# Patient Record
Sex: MALE | Race: WHITE | NOT HISPANIC OR LATINO | Employment: OTHER | URBAN - METROPOLITAN AREA
[De-identification: names, ages, dates, MRNs, and addresses within clinical notes are randomized per-mention and may not be internally consistent; named-entity substitution may affect disease eponyms.]

---

## 2023-02-07 ENCOUNTER — APPOINTMENT (OUTPATIENT)
Dept: GENERAL RADIOLOGY | Facility: HOSPITAL | Age: 88
DRG: 522 | End: 2023-02-07
Payer: MEDICARE

## 2023-02-07 ENCOUNTER — HOSPITAL ENCOUNTER (INPATIENT)
Facility: HOSPITAL | Age: 88
LOS: 7 days | Discharge: NURSING, REHAB OR LONG-TERM CARE | DRG: 522 | End: 2023-02-16
Attending: EMERGENCY MEDICINE | Admitting: HOSPITALIST
Payer: MEDICARE

## 2023-02-07 ENCOUNTER — APPOINTMENT (OUTPATIENT)
Dept: CT IMAGING | Facility: HOSPITAL | Age: 88
DRG: 522 | End: 2023-02-07
Payer: MEDICARE

## 2023-02-07 DIAGNOSIS — S42.464A CLOSED NONDISPLACED FRACTURE OF MEDIAL CONDYLE OF RIGHT HUMERUS, INITIAL ENCOUNTER: ICD-10-CM

## 2023-02-07 DIAGNOSIS — Z86.59 HISTORY OF DEMENTIA: ICD-10-CM

## 2023-02-07 DIAGNOSIS — S72.001A CLOSED FRACTURE OF NECK OF RIGHT FEMUR, INITIAL ENCOUNTER: ICD-10-CM

## 2023-02-07 DIAGNOSIS — S42.201A CLOSED FRACTURE OF PROXIMAL END OF RIGHT HUMERUS, UNSPECIFIED FRACTURE MORPHOLOGY, INITIAL ENCOUNTER: Primary | ICD-10-CM

## 2023-02-07 DIAGNOSIS — S22.000A COMPRESSION FRACTURE OF THORACIC VERTEBRA, UNSPECIFIED THORACIC VERTEBRAL LEVEL, INITIAL ENCOUNTER: ICD-10-CM

## 2023-02-07 PROBLEM — E78.5 HLD (HYPERLIPIDEMIA): Status: ACTIVE | Noted: 2023-02-07

## 2023-02-07 PROBLEM — I10 HTN (HYPERTENSION): Status: ACTIVE | Noted: 2023-02-07

## 2023-02-07 LAB
ANION GAP SERPL CALCULATED.3IONS-SCNC: 6 MMOL/L (ref 5–15)
BASOPHILS # BLD AUTO: 0.03 10*3/MM3 (ref 0–0.2)
BASOPHILS NFR BLD AUTO: 0.3 % (ref 0–1.5)
BUN SERPL-MCNC: 20 MG/DL (ref 8–23)
BUN/CREAT SERPL: 19.6 (ref 7–25)
CALCIUM SPEC-SCNC: 8.5 MG/DL (ref 8.6–10.5)
CHLORIDE SERPL-SCNC: 104 MMOL/L (ref 98–107)
CO2 SERPL-SCNC: 27 MMOL/L (ref 22–29)
CREAT SERPL-MCNC: 1.02 MG/DL (ref 0.76–1.27)
DEPRECATED RDW RBC AUTO: 41.9 FL (ref 37–54)
EGFRCR SERPLBLD CKD-EPI 2021: 70.7 ML/MIN/1.73
EOSINOPHIL # BLD AUTO: 0.08 10*3/MM3 (ref 0–0.4)
EOSINOPHIL NFR BLD AUTO: 0.8 % (ref 0.3–6.2)
ERYTHROCYTE [DISTWIDTH] IN BLOOD BY AUTOMATED COUNT: 12.4 % (ref 12.3–15.4)
GLUCOSE SERPL-MCNC: 137 MG/DL (ref 65–99)
HCT VFR BLD AUTO: 34.6 % (ref 37.5–51)
HGB BLD-MCNC: 11.5 G/DL (ref 13–17.7)
HOLD SPECIMEN: NORMAL
HOLD SPECIMEN: NORMAL
IMM GRANULOCYTES # BLD AUTO: 0.05 10*3/MM3 (ref 0–0.05)
IMM GRANULOCYTES NFR BLD AUTO: 0.5 % (ref 0–0.5)
LYMPHOCYTES # BLD AUTO: 0.81 10*3/MM3 (ref 0.7–3.1)
LYMPHOCYTES NFR BLD AUTO: 7.7 % (ref 19.6–45.3)
MCH RBC QN AUTO: 30.9 PG (ref 26.6–33)
MCHC RBC AUTO-ENTMCNC: 33.2 G/DL (ref 31.5–35.7)
MCV RBC AUTO: 93 FL (ref 79–97)
MONOCYTES # BLD AUTO: 0.58 10*3/MM3 (ref 0.1–0.9)
MONOCYTES NFR BLD AUTO: 5.5 % (ref 5–12)
NEUTROPHILS NFR BLD AUTO: 85.2 % (ref 42.7–76)
NEUTROPHILS NFR BLD AUTO: 9 10*3/MM3 (ref 1.7–7)
NRBC BLD AUTO-RTO: 0 /100 WBC (ref 0–0.2)
PLATELET # BLD AUTO: 179 10*3/MM3 (ref 140–450)
PMV BLD AUTO: 10.1 FL (ref 6–12)
POTASSIUM SERPL-SCNC: 3.8 MMOL/L (ref 3.5–5.2)
RBC # BLD AUTO: 3.72 10*6/MM3 (ref 4.14–5.8)
SARS-COV-2 RNA RESP QL NAA+PROBE: NOT DETECTED
SODIUM SERPL-SCNC: 137 MMOL/L (ref 136–145)
WBC NRBC COR # BLD: 10.55 10*3/MM3 (ref 3.4–10.8)
WHOLE BLOOD HOLD COAG: NORMAL
WHOLE BLOOD HOLD SPECIMEN: NORMAL

## 2023-02-07 PROCEDURE — 73030 X-RAY EXAM OF SHOULDER: CPT

## 2023-02-07 PROCEDURE — G0378 HOSPITAL OBSERVATION PER HR: HCPCS

## 2023-02-07 PROCEDURE — 72125 CT NECK SPINE W/O DYE: CPT

## 2023-02-07 PROCEDURE — 80048 BASIC METABOLIC PNL TOTAL CA: CPT | Performed by: EMERGENCY MEDICINE

## 2023-02-07 PROCEDURE — 70450 CT HEAD/BRAIN W/O DYE: CPT

## 2023-02-07 PROCEDURE — U0005 INFEC AGEN DETEC AMPLI PROBE: HCPCS | Performed by: EMERGENCY MEDICINE

## 2023-02-07 PROCEDURE — 99285 EMERGENCY DEPT VISIT HI MDM: CPT

## 2023-02-07 PROCEDURE — 85025 COMPLETE CBC W/AUTO DIFF WBC: CPT | Performed by: EMERGENCY MEDICINE

## 2023-02-07 PROCEDURE — U0003 INFECTIOUS AGENT DETECTION BY NUCLEIC ACID (DNA OR RNA); SEVERE ACUTE RESPIRATORY SYNDROME CORONAVIRUS 2 (SARS-COV-2) (CORONAVIRUS DISEASE [COVID-19]), AMPLIFIED PROBE TECHNIQUE, MAKING USE OF HIGH THROUGHPUT TECHNOLOGIES AS DESCRIBED BY CMS-2020-01-R: HCPCS | Performed by: EMERGENCY MEDICINE

## 2023-02-07 RX ORDER — FOLIC ACID 1 MG/1
1 TABLET ORAL DAILY
COMMUNITY

## 2023-02-07 RX ORDER — TRAMADOL HYDROCHLORIDE 50 MG/1
50 TABLET ORAL ONCE
Status: COMPLETED | OUTPATIENT
Start: 2023-02-07 | End: 2023-02-07

## 2023-02-07 RX ORDER — OMEPRAZOLE 20 MG/1
20 CAPSULE, DELAYED RELEASE ORAL DAILY
COMMUNITY

## 2023-02-07 RX ORDER — BISACODYL 10 MG
10 SUPPOSITORY, RECTAL RECTAL DAILY PRN
Status: DISCONTINUED | OUTPATIENT
Start: 2023-02-07 | End: 2023-02-16 | Stop reason: HOSPADM

## 2023-02-07 RX ORDER — LANOLIN ALCOHOL/MO/W.PET/CERES
6 CREAM (GRAM) TOPICAL NIGHTLY
COMMUNITY

## 2023-02-07 RX ORDER — LOSARTAN POTASSIUM 25 MG/1
25 TABLET ORAL DAILY
COMMUNITY

## 2023-02-07 RX ORDER — ACETAMINOPHEN 325 MG/1
650 TABLET ORAL EVERY 4 HOURS PRN
Status: DISCONTINUED | OUTPATIENT
Start: 2023-02-07 | End: 2023-02-16 | Stop reason: HOSPADM

## 2023-02-07 RX ORDER — POLYETHYLENE GLYCOL 3350 17 G/17G
17 POWDER, FOR SOLUTION ORAL DAILY PRN
Status: DISCONTINUED | OUTPATIENT
Start: 2023-02-07 | End: 2023-02-16 | Stop reason: HOSPADM

## 2023-02-07 RX ORDER — LEVOTHYROXINE SODIUM 0.03 MG/1
25 TABLET ORAL
COMMUNITY

## 2023-02-07 RX ORDER — ONDANSETRON 2 MG/ML
4 INJECTION INTRAMUSCULAR; INTRAVENOUS EVERY 6 HOURS PRN
Status: DISCONTINUED | OUTPATIENT
Start: 2023-02-07 | End: 2023-02-10 | Stop reason: SDUPTHER

## 2023-02-07 RX ORDER — MELATONIN
1000 DAILY
COMMUNITY
End: 2023-02-16 | Stop reason: HOSPADM

## 2023-02-07 RX ORDER — FINASTERIDE 5 MG/1
5 TABLET, FILM COATED ORAL DAILY
COMMUNITY

## 2023-02-07 RX ORDER — ALFUZOSIN HYDROCHLORIDE 10 MG/1
10 TABLET, EXTENDED RELEASE ORAL DAILY
COMMUNITY

## 2023-02-07 RX ORDER — ACETAMINOPHEN 500 MG
1000 TABLET ORAL ONCE
Status: COMPLETED | OUTPATIENT
Start: 2023-02-07 | End: 2023-02-07

## 2023-02-07 RX ORDER — BISACODYL 5 MG/1
5 TABLET, DELAYED RELEASE ORAL DAILY PRN
Status: DISCONTINUED | OUTPATIENT
Start: 2023-02-07 | End: 2023-02-16 | Stop reason: HOSPADM

## 2023-02-07 RX ORDER — ONDANSETRON 4 MG/1
4 TABLET, FILM COATED ORAL EVERY 6 HOURS PRN
Status: DISCONTINUED | OUTPATIENT
Start: 2023-02-07 | End: 2023-02-10 | Stop reason: SDUPTHER

## 2023-02-07 RX ORDER — GUAIFENESIN AND DEXTROMETHORPHAN HYDROBROMIDE 100; 10 MG/5ML; MG/5ML
5 SOLUTION ORAL EVERY 4 HOURS PRN
COMMUNITY
End: 2023-02-16 | Stop reason: HOSPADM

## 2023-02-07 RX ORDER — SODIUM CHLORIDE 0.9 % (FLUSH) 0.9 %
10 SYRINGE (ML) INJECTION AS NEEDED
Status: DISCONTINUED | OUTPATIENT
Start: 2023-02-07 | End: 2023-02-10

## 2023-02-07 RX ORDER — HYDROCODONE BITARTRATE AND ACETAMINOPHEN 5; 325 MG/1; MG/1
1 TABLET ORAL EVERY 4 HOURS PRN
Status: DISCONTINUED | OUTPATIENT
Start: 2023-02-08 | End: 2023-02-09

## 2023-02-07 RX ORDER — ALUMINA, MAGNESIA, AND SIMETHICONE 2400; 2400; 240 MG/30ML; MG/30ML; MG/30ML
15 SUSPENSION ORAL EVERY 6 HOURS PRN
Status: DISCONTINUED | OUTPATIENT
Start: 2023-02-07 | End: 2023-02-16 | Stop reason: HOSPADM

## 2023-02-07 RX ORDER — MEMANTINE HYDROCHLORIDE 10 MG/1
10 TABLET ORAL 2 TIMES DAILY
COMMUNITY

## 2023-02-07 RX ORDER — SIMVASTATIN 40 MG
40 TABLET ORAL DAILY
COMMUNITY

## 2023-02-07 RX ORDER — ACETAMINOPHEN 325 MG/1
650 TABLET ORAL EVERY 8 HOURS PRN
COMMUNITY
End: 2023-02-16 | Stop reason: HOSPADM

## 2023-02-07 RX ORDER — ALBUTEROL SULFATE 90 UG/1
2 AEROSOL, METERED RESPIRATORY (INHALATION) EVERY 6 HOURS PRN
COMMUNITY

## 2023-02-07 RX ORDER — UREA 10 %
3 LOTION (ML) TOPICAL NIGHTLY PRN
Status: DISCONTINUED | OUTPATIENT
Start: 2023-02-07 | End: 2023-02-10

## 2023-02-07 RX ORDER — HYDROCODONE BITARTRATE AND ACETAMINOPHEN 5; 325 MG/1; MG/1
1 TABLET ORAL EVERY 6 HOURS PRN
Status: DISCONTINUED | OUTPATIENT
Start: 2023-02-07 | End: 2023-02-07

## 2023-02-07 RX ORDER — CARVEDILOL 12.5 MG/1
12.5 TABLET ORAL 2 TIMES DAILY WITH MEALS
COMMUNITY

## 2023-02-07 RX ORDER — AMOXICILLIN 250 MG
2 CAPSULE ORAL 2 TIMES DAILY
Status: DISCONTINUED | OUTPATIENT
Start: 2023-02-07 | End: 2023-02-16 | Stop reason: HOSPADM

## 2023-02-07 RX ADMIN — HYDROCODONE BITARTRATE AND ACETAMINOPHEN 1 TABLET: 5; 325 TABLET ORAL at 21:06

## 2023-02-07 RX ADMIN — ACETAMINOPHEN 1000 MG: 500 TABLET, FILM COATED ORAL at 18:08

## 2023-02-07 RX ADMIN — TRAMADOL HYDROCHLORIDE 50 MG: 50 TABLET, COATED ORAL at 16:29

## 2023-02-07 NOTE — DISCHARGE PLACEMENT REQUEST
"Juancarlos Woo (88 y.o. Male)     Date of Birth   1934    Social Security Number       Address   [address] [place] [address]    Home Phone   [phone]    MRN   [id number]       Mandaen   [employer]    Marital Status   Single                            Admission Date   2/7/23    Admission Type   Emergency    Admitting Provider       Attending Provider   Juanpablo Blanchard MD    Department, Room/Bed   [facility] Emergency Department, 38/38       Discharge Date       Discharge Disposition       Discharge Destination                               Attending Provider: Juanpablo Blanchard MD    Allergies: Lisinopril    Isolation: None   Infection: None   Code Status: Not on file    Ht: 182.9 cm (72\")   Wt: 63.5 kg (140 lb)    Admission Cmt: None   Principal Problem: None                Active Insurance as of 2/7/2023     Primary Coverage     Payor Plan Insurance Group Employer/Plan Group    MEDICARE MEDICARE A & B      Payor Plan Address Payor Plan Phone Number Payor Plan Fax Number Effective Dates    [address] [phone]  10/1/1999 - None Entered    [facility] [id number]       Subscriber Name Subscriber Birth Date Member ID       JUANCARLOS WOO 1934 [id number]           Secondary Coverage     Payor Plan Insurance Group Employer/Plan Group    [facility] [place]RATION [place] DEPT [id number]     Payor Plan Address Payor Plan Phone Number Payor Plan Fax Number Effective Dates    [facility] OFFICE OF COMMUNITY CARE [phone]  2/2/2023 - None Entered    [address] [address]       [facility] [address]       Subscriber Name Subscriber Birth Date Member ID       JUANCARLOS WOO 1934 [id number]           Tertiary Coverage     Payor Plan Insurance Group Employer/Plan Group    [employer] SUP AARP HEALTH CARE OPTIONS      Payor Plan Address Payor Plan Phone Number Payor Plan Fax Number Effective Dates    [facility] [phone]  1/1/2023 - None Entered    [address]       [facility] [address]   "     Subscriber Name Subscriber Birth Date Member ID       JUANCARLOS WOO 1934 [id number]                 Emergency Contacts      (Rel.) Home Phone Work Phone Mobile Phone    ROSAURA,JJ (Daughter) -- -- [phone]

## 2023-02-07 NOTE — ED PROVIDER NOTES
EMERGENCY DEPARTMENT ENCOUNTER    Room Number:  P383/1  Date of encounter:  2/8/2023  PCP: Princess Yi MD  Historian: Patient, daughter      HPI:  Chief Complaint: Fall, right shoulder pain  A complete HPI/ROS/PMH/PSH/SH/FH are unobtainable due to: Dementia    Context: Cara Garvey is a 88 y.o. male who presents to the ED via [facility] for an unwitnessed fall today in the bathroom.  Patient initially denied any pain other than right shoulder and has since started complaining of head and neck pain.  Patient had a C2 fracture in July and has been in a soft collar.  Has not had any significant pain recently.  Seems to be at baseline per family.  Not in any anticoagulation. Most severe pain in the right shoulder.  Patient lives in a memory care unit.      MEDICAL RECORD REVIEW    External (non-ED) record review: Paperwork accompanying the patient from facility does not show any anticoagulants, Tylenol is only listed pain medication    PAST MEDICAL HISTORY  Active Ambulatory Problems     Diagnosis Date Noted   • No Active Ambulatory Problems     Resolved Ambulatory Problems     Diagnosis Date Noted   • No Resolved Ambulatory Problems     No Additional Past Medical History         PAST SURGICAL HISTORY  No past surgical history on file.      FAMILY HISTORY  No family history on file.      SOCIAL HISTORY  Social History     Socioeconomic History   • Marital status: Single   Substance and Sexual Activity   • Alcohol use: Not Currently     Comment: Has been in a nursing home for over 3 months   • Drug use: Never   • Sexual activity: Not Currently     Partners: Female         ALLERGIES  Lisinopril        REVIEW OF SYSTEMS  Review of Systems     All systems reviewed and negative except for those discussed in HPI.       PHYSICAL EXAM    I have reviewed the triage vital signs and nursing notes.    ED Triage Vitals   Temp Heart Rate Resp BP SpO2   02/07/23 1411 02/07/23 1411 02/07/23 1411  02/07/23 1411 02/07/23 1411   96.5 °F (35.8 °C) 51 18 127/53 96 %      Temp src Heart Rate Source Patient Position BP Location FiO2 (%)   02/07/23 1411 02/07/23 1411 02/07/23 1531 02/07/23 1531 --   Tympanic Monitor Lying Right arm        Physical Exam  General: No acute distress, nontoxic  HEENT: Mucous membranes moist, atraumatic without palpable scalp hematoma or laceration, EOMI  Neck: Soft collar in place, trachea midline  Pulm: Symmetric chest rise, nonlabored, lungs CTAB  Cardiovascular: Regular rate and rhythm, intact distal pulses  GI: Soft, nontender, nondistended, no rebound, no guarding, bowel sounds present  MSK: Right shoulder tenderness to palpation worse in the proximal humerus region, elbow is nontender to palpation without deformity, no clavicular step-off  Skin: Warm, dry  Neuro: Awake, alert, GCS 15, moving all extremities, no focal deficits  Psych: Calm, cooperative      N95, protective eye goggles, and gloves used during this encounter. Patient in surgical mask.      LAB RESULTS  Recent Results (from the past 24 hour(s))   Green Top (Gel)    Collection Time: 02/07/23  3:16 PM   Result Value Ref Range    Extra Tube Hold for add-ons.    Lavender Top    Collection Time: 02/07/23  3:16 PM   Result Value Ref Range    Extra Tube hold for add-on    Gold Top - SST    Collection Time: 02/07/23  3:16 PM   Result Value Ref Range    Extra Tube Hold for add-ons.    Light Blue Top    Collection Time: 02/07/23  3:16 PM   Result Value Ref Range    Extra Tube Hold for add-ons.    Basic Metabolic Panel    Collection Time: 02/07/23  3:16 PM    Specimen: Blood   Result Value Ref Range    Glucose 137 (H) 65 - 99 mg/dL    BUN 20 8 - 23 mg/dL    Creatinine 1.02 0.76 - 1.27 mg/dL    Sodium 137 136 - 145 mmol/L    Potassium 3.8 3.5 - 5.2 mmol/L    Chloride 104 98 - 107 mmol/L    CO2 27.0 22.0 - 29.0 mmol/L    Calcium 8.5 (L) 8.6 - 10.5 mg/dL    BUN/Creatinine Ratio 19.6 7.0 - 25.0    Anion Gap 6.0 5.0 - 15.0 mmol/L     eGFR 70.7 >60.0 mL/min/1.73   CBC Auto Differential    Collection Time: 02/07/23  3:16 PM    Specimen: Blood   Result Value Ref Range    WBC 10.55 3.40 - 10.80 10*3/mm3    RBC 3.72 (L) 4.14 - 5.80 10*6/mm3    Hemoglobin 11.5 (L) 13.0 - 17.7 g/dL    Hematocrit 34.6 (L) 37.5 - 51.0 %    MCV 93.0 79.0 - 97.0 fL    MCH 30.9 26.6 - 33.0 pg    MCHC 33.2 31.5 - 35.7 g/dL    RDW 12.4 12.3 - 15.4 %    RDW-SD 41.9 37.0 - 54.0 fl    MPV 10.1 6.0 - 12.0 fL    Platelets 179 140 - 450 10*3/mm3    Neutrophil % 85.2 (H) 42.7 - 76.0 %    Lymphocyte % 7.7 (L) 19.6 - 45.3 %    Monocyte % 5.5 5.0 - 12.0 %    Eosinophil % 0.8 0.3 - 6.2 %    Basophil % 0.3 0.0 - 1.5 %    Immature Grans % 0.5 0.0 - 0.5 %    Neutrophils, Absolute 9.00 (H) 1.70 - 7.00 10*3/mm3    Lymphocytes, Absolute 0.81 0.70 - 3.10 10*3/mm3    Monocytes, Absolute 0.58 0.10 - 0.90 10*3/mm3    Eosinophils, Absolute 0.08 0.00 - 0.40 10*3/mm3    Basophils, Absolute 0.03 0.00 - 0.20 10*3/mm3    Immature Grans, Absolute 0.05 0.00 - 0.05 10*3/mm3    nRBC 0.0 0.0 - 0.2 /100 WBC   COVID-19,[facility] CARRI IN-HOUSE CEPHEID/PRINCE NP SWAB IN TRANSPORT MEDIA 8-12 HR TAT - Swab, Nasopharynx    Collection Time: 02/07/23  6:08 PM    Specimen: Nasopharynx; Swab   Result Value Ref Range    COVID19 Not Detected Not Detected - Ref. Range       Ordered the above labs and independently interpreted results. My findings will be discussed in the medical decision making section below        RADIOLOGY  XR Shoulder 2+ View Right    Result Date: 2/7/2023  XR SHOULDER 2+ VW RIGHT-  INDICATIONS: Trauma  TECHNIQUE: 2 views of the right shoulder  COMPARISON: None available  FINDINGS:  Comminuted fracture of the proximal right humerus is noted, with as much as about 3 mm cortical step-off. No other fractures are identified. Moderate hypertrophic degenerative change is apparent at the acromioclavicular joint. No dislocation.       Proximal right humerus fracture.  This report was finalized on 2/7/2023 2:55 PM by  Dr. Eddie Jones M.D.      CT Head Without Contrast, CT Cervical Spine Without Contrast    Result Date: 2/7/2023  CT HEAD AND CERVICAL SPINE WITHOUT CONTRAST  CLINICAL HISTORY: Unwitnessed fall. Neck pain. History of C2 fracture in July 2022.  TECHNIQUE: CT scan of the head was obtained with 3 mm axial soft tissue and 2 mm bone algorithm images. No intravenous contrast was administered. Sagittal and coronal reconstructions were obtained.  COMPARISON: No previous similar studies are currently available for comparison.  FINDINGS:   There is no evidence for a calvarial fracture and there is no evidence for an acute extra-axial hemorrhage. The ventricles, sulci, and cisterns are age-appropriate. The gray-white matter differentiation is within normal limits. The basal ganglia and thalami are unremarkable in appearance. The posterior fossa structures are within normal limits. Atherosclerotic calcifications are incidentally appreciated within the intracranial vasculature. Mild changes of chronic small vessel ischemic phenomena are incidentally noted.  There is mild mucosal thickening noted within the left aspect of the frontal sinus and this extends into the adjacent frontal ethmoidal recess.       No evidence for acute traumatic intracranial pathology.    TECHNIQUE: CT scan of the cervical spine was obtained with 1 mm axial bone algorithm and 2 mm axial soft tissue algorithm images. Sagittal and coronal reconstructed images were obtained.  FINDINGS:  The patient apparently has a history of a C2 fracture. There are no prior cervical spine imaging studies currently available for comparison. There is an age-indeterminate deformity within the base of the odontoid process. Unfortunately, as there are no previous studies available for comparison, any change from the patient's baseline cannot be assessed on the basis of this study. If older studies are made available for comparison, an addendum will be gladly generated.   There are mild age-indeterminate compression deformity seen at T2 and T3.  There is anterior spondylolisthesis of C4 on C5 by approximately 3 mm. Advanced degenerative disc changes are seen at the C5-6 level. A disc osteophyte complex at C5-C6 results in a mild degree of canal stenosis. Bulging disc material at C3-4 and C4-5 results in up to a moderate-to-severe degree of canal stenosis at the C4-5 level. There is probable cord compression at this level. Foraminal stenotic changes are most prominently seen at the C3-4, C4-5, and C5-6 levels. There is osseous fusion noted across the C6-7 disc space presumably representing a prior anterior cervical discectomy and fusion procedure.  There is an indeterminate hypodense nodule within the right lobe of the thyroid measuring up to 2.4 cm in diameter. This could be further evaluated with sonography, only if clinically indicated.  IMPRESSION:  There is a deformity at the level of the base of the odontoid process. The patient reportedly has a history of an odontoid fracture. However, the appearance of the odontoid with respect to its baseline appearance is not assessed as there are no previous cervical spine imaging studies currently available for comparison. If these images are submitted for comparison, an addendum would be gladly generated. Unfortunately, on the basis of these images, an acute injury to the odontoid could not be excluded.  There is mild vertebral body height loss at the T2 and T3 levels. These compression deformities are age indeterminate. Further temporal characterization could be made with MR imaging, as clinically indicated.  There is degenerative phenomena within the cervical spine that has been discussed in detail above. There is up to a moderate-to-severe degree of central canal stenosis at the C4-5 level secondary to a disc bulge with probable cord compression.  There is osseous fusion seen across the C6-7 disc space that is presumably from a prior  anterior cervical discectomy and fusion procedure.  There is an indeterminate 2.4 cm hypodense nodule within the right lobe of the thyroid which could be further evaluated with thyroid sonography, only if clinically indicated.  These findings and recommendations were discussed with Dr. Lee Blanchard on 02/07/2023 at approximately 6:12 PM.   Radiation dose reduction techniques were utilized, including automated exposure control and exposure modulation based on body size.  This report was finalized on 2/7/2023 6:29 PM by Dr. Roby Mendez M.D.        Ordered the above noted radiological studies.  Independently interpreted by me and my independent review of findings can be found in the ED Course.  See dictation for official radiology interpretation.      PROCEDURES    Procedures      MEDICATIONS GIVEN IN ER    Medications   sodium chloride 0.9 % flush 10 mL (has no administration in time range)   acetaminophen (TYLENOL) tablet 650 mg (has no administration in time range)   melatonin tablet 3 mg (has no administration in time range)   sennosides-docusate (PERICOLACE) 8.6-50 MG per tablet 2 tablet (has no administration in time range)     And   polyethylene glycol (MIRALAX) packet 17 g (has no administration in time range)     And   bisacodyl (DULCOLAX) EC tablet 5 mg (has no administration in time range)     And   bisacodyl (DULCOLAX) suppository 10 mg (has no administration in time range)   ondansetron (ZOFRAN) tablet 4 mg (has no administration in time range)     Or   ondansetron (ZOFRAN) injection 4 mg (has no administration in time range)   aluminum-magnesium hydroxide-simethicone (MAALOX MAX) 400-400-40 MG/5ML suspension 15 mL (has no administration in time range)   HYDROcodone-acetaminophen (NORCO) 5-325 MG per tablet 1 tablet (has no administration in time range)   traMADol (ULTRAM) tablet 50 mg (50 mg Oral Given 2/7/23 1629)   acetaminophen (TYLENOL) tablet 1,000 mg (1,000 mg Oral Given 2/7/23 1808)         PROGRESS,  DATA ANALYSIS, CONSULTS, AND MEDICAL DECISION MAKING    Please note that this section constitutes my independent interpretation of clinical data including lab results, radiology, EKG's.  This constitutes my independent professional opinion regarding differential diagnosis and management of this patient.  It may include any factors such as history from outside sources, review of external records, social determinants of health, management of medications, response to those treatments, and discussions with other providers.    Differential Diagnosis and Plan: Initial concern for shoulder dislocation, humeral fracture, clavicular fracture, aggravation of underlying cervical fracture, intracranial hemorrhage, among others.  Plan for CT head, CT C-spine, right shoulder x-ray, and reevaluation with results.    Additional sources:  - Discussed/ obtained information from independent historians:   Daughter at bedside providing additional history that the patient cannot due to dementia     - Chronic or social conditions impacting care: History of dementia living at memory care unit     - Shared decision making:  Patient and family fully updated on and in agreement with the course and plan moving forward    ED Course as of 02/08/23 0006   e Feb 07, 2023   1630 XR Shoulder 2+ View Right  My independent interpretation of the right shoulder x-ray shows a proximal right humerus fracture with no obvious clavicular fracture or pneumothorax in the visible lung field [DC]   1840 WBC: 10.55 [DC]   1840 Hemoglobin(!): 11.5 [DC]   1851 CT Head Without Contrast  Independently reviewed by me, no evidence of intracranial hemorrhage [DC]   1851 CT Head Without Contrast  I did discuss images with the radiologist Dr. Mendez, no evidence of any acute emergent intracranial abnormality [DC]   1852 CT Cervical Spine Without Contrast  Discussed with Dr. Mendez, radiologist, patient has evidence of the C2 fracture though unclear if any difference from  prior as no imaging to compare to.  Does note T2 and T3 compression fractures which are age-indeterminate [DC]   1906 COVID19: Not Detected [DC]   1946 Glucose(!): 137 [DC]   1946 BUN: 20 [DC]   1946 Creatinine: 1.02 [DC]   1946 Sodium: 137 [DC]   1947 Proximal humerus fracture as noted, age-indeterminate thoracic compression fractures as noted, C2 fracture is noted.  At this point facility that he currently lives at will not be able to care for him in his current state, will have to hospitalize with plans for short-term rehab placement.  Family has been fully updated and in agreement with the course and plan moving forward. [DC]   2054 Discussed with LEWIS Daniels, [facility], discussed patient's clinical course and findings today, treatment modalities, and need for hospitalization [DC]      ED Course User Index  [DC] Juanpablo Blanchard MD       Orders Placed During This Visit:  Orders Placed This Encounter   Procedures   • [facility] 02.  Shoulder Immobilizer/Sling   • COVID PRE-OP / PRE-PROCEDURE SCREENING ORDER (NO ISOLATION) - Swab, Nasopharynx   • COVID-19,[facility] CAR[facility] IN-HOUSE CEPHEID/PRINCE NP SWAB IN TRANSPORT MEDIA 8-12 HR TAT - Swab, Nasopharynx   • XR Shoulder 2+ View Right   • CT Head Without Contrast   • CT Cervical Spine Without Contrast   • [place] Draw   • Basic Metabolic Panel   • CBC Auto Differential   • NPO Diet NPO Type: Strict NPO   • Vital Signs   • Intake & Output   • Oral Care   • Place Sequential Compression Device   • Maintain Sequential Compression Device   • Up With Assistance   • Notify Provider (With Default Parameters)   • Code Status and Medical Interventions:   • A (on-call MD unless specified) Details   • Inpatient Orthopedic Surgery Consult   • Incentive Spirometry   • Oxygen Therapy- Nasal Cannula; Titrate for SPO2: 90% - 95%   • Initiate Observation Status   • Green Top (Gel)   • Lavender Top   • Gold Top - SST   • Light Blue Top   • CBC & Differential       Additional  orders considered but not placed:      Independent interpretation of labs, radiology studies, and discussions with consultants: See ED Course        AS OF 00:06 EST VITALS:    BP - 128/50  HR - 57  TEMP - 98.3 °F (36.8 °C) (Oral)  02 SATS - 93%        DIAGNOSIS  Final diagnoses:   Closed fracture of proximal end of right humerus, unspecified fracture morphology, initial encounter   History of dementia   Compression fracture of thoracic vertebra, unspecified thoracic vertebral level, initial encounter ([facility])         DISPOSITION  HOSPITALIZATION    Discussed treatment plan and reason for hospitalization with pt/family and hospitalizing physician.  Pt/family voiced understanding of the plan for hospitalization for further testing/treatment as needed.                   --    Please note that portions of this were completed with a voice recognition program.       Note Disclaimer: At [facility], we believe that sharing information builds trust and better relationships. You are receiving this note because you are receiving care at [facility] or recently visited. It is possible you will see health information before a provider has talked with you about it. This kind of information can be easy to misunderstand. To help you fully understand what it means for your health, we urge you to discuss this note with your provider.         Juanpablo Blanchard MD  02/08/23 0006

## 2023-02-07 NOTE — ED NOTES
Pt arrives via EMS from [facility] for a fall today in the bathroom. Pt denies hitting his head. Pt had a neck brace on prior to EMS arrival from a previous injury. Pt complains of right shoulder pain that goes down to his fingers. Pt is not on blood thinners

## 2023-02-08 PROBLEM — K21.9 GERD (GASTROESOPHAGEAL REFLUX DISEASE): Status: ACTIVE | Noted: 2023-02-08

## 2023-02-08 PROBLEM — I25.10 CAD (CORONARY ARTERY DISEASE): Status: ACTIVE | Noted: 2023-02-08

## 2023-02-08 LAB
DEPRECATED RDW RBC AUTO: 42.6 FL (ref 37–54)
ERYTHROCYTE [DISTWIDTH] IN BLOOD BY AUTOMATED COUNT: 12.6 % (ref 12.3–15.4)
FOLATE SERPL-MCNC: >20 NG/ML (ref 4.78–24.2)
HCT VFR BLD AUTO: 30.9 % (ref 37.5–51)
HGB BLD-MCNC: 9.9 G/DL (ref 13–17.7)
IRON 24H UR-MRATE: 40 MCG/DL (ref 59–158)
IRON SATN MFR SERPL: 15 % (ref 20–50)
MCH RBC QN AUTO: 29.6 PG (ref 26.6–33)
MCHC RBC AUTO-ENTMCNC: 32 G/DL (ref 31.5–35.7)
MCV RBC AUTO: 92.5 FL (ref 79–97)
PLATELET # BLD AUTO: 148 10*3/MM3 (ref 140–450)
PMV BLD AUTO: 9.9 FL (ref 6–12)
RBC # BLD AUTO: 3.34 10*6/MM3 (ref 4.14–5.8)
TIBC SERPL-MCNC: 265 MCG/DL (ref 298–536)
TRANSFERRIN SERPL-MCNC: 178 MG/DL (ref 200–360)
VIT B12 BLD-MCNC: 551 PG/ML (ref 211–946)
WBC NRBC COR # BLD: 8.57 10*3/MM3 (ref 3.4–10.8)

## 2023-02-08 PROCEDURE — 84466 ASSAY OF TRANSFERRIN: CPT | Performed by: STUDENT IN AN ORGANIZED HEALTH CARE EDUCATION/TRAINING PROGRAM

## 2023-02-08 PROCEDURE — G0378 HOSPITAL OBSERVATION PER HR: HCPCS

## 2023-02-08 PROCEDURE — 82746 ASSAY OF FOLIC ACID SERUM: CPT | Performed by: STUDENT IN AN ORGANIZED HEALTH CARE EDUCATION/TRAINING PROGRAM

## 2023-02-08 PROCEDURE — 25010000002 MORPHINE PER 10 MG: Performed by: NURSE PRACTITIONER

## 2023-02-08 PROCEDURE — 83540 ASSAY OF IRON: CPT | Performed by: STUDENT IN AN ORGANIZED HEALTH CARE EDUCATION/TRAINING PROGRAM

## 2023-02-08 PROCEDURE — 85027 COMPLETE CBC AUTOMATED: CPT | Performed by: STUDENT IN AN ORGANIZED HEALTH CARE EDUCATION/TRAINING PROGRAM

## 2023-02-08 PROCEDURE — 92610 EVALUATE SWALLOWING FUNCTION: CPT

## 2023-02-08 PROCEDURE — 82607 VITAMIN B-12: CPT | Performed by: STUDENT IN AN ORGANIZED HEALTH CARE EDUCATION/TRAINING PROGRAM

## 2023-02-08 RX ORDER — CARVEDILOL 12.5 MG/1
12.5 TABLET ORAL 2 TIMES DAILY WITH MEALS
Status: DISCONTINUED | OUTPATIENT
Start: 2023-02-08 | End: 2023-02-16 | Stop reason: HOSPADM

## 2023-02-08 RX ORDER — MORPHINE SULFATE 2 MG/ML
2 INJECTION, SOLUTION INTRAMUSCULAR; INTRAVENOUS
Status: DISCONTINUED | OUTPATIENT
Start: 2023-02-08 | End: 2023-02-10

## 2023-02-08 RX ORDER — TAMSULOSIN HYDROCHLORIDE 0.4 MG/1
0.4 CAPSULE ORAL NIGHTLY
Status: DISCONTINUED | OUTPATIENT
Start: 2023-02-08 | End: 2023-02-16 | Stop reason: HOSPADM

## 2023-02-08 RX ORDER — LEVOTHYROXINE SODIUM 0.03 MG/1
25 TABLET ORAL
Status: DISCONTINUED | OUTPATIENT
Start: 2023-02-08 | End: 2023-02-16 | Stop reason: HOSPADM

## 2023-02-08 RX ORDER — PANTOPRAZOLE SODIUM 40 MG/1
40 TABLET, DELAYED RELEASE ORAL
Status: DISCONTINUED | OUTPATIENT
Start: 2023-02-08 | End: 2023-02-10

## 2023-02-08 RX ORDER — ATORVASTATIN CALCIUM 20 MG/1
20 TABLET, FILM COATED ORAL DAILY
Status: DISCONTINUED | OUTPATIENT
Start: 2023-02-08 | End: 2023-02-16 | Stop reason: HOSPADM

## 2023-02-08 RX ORDER — LOSARTAN POTASSIUM 25 MG/1
25 TABLET ORAL DAILY
Status: DISCONTINUED | OUTPATIENT
Start: 2023-02-08 | End: 2023-02-09

## 2023-02-08 RX ORDER — MEMANTINE HYDROCHLORIDE 10 MG/1
10 TABLET ORAL EVERY 12 HOURS SCHEDULED
Status: DISCONTINUED | OUTPATIENT
Start: 2023-02-08 | End: 2023-02-16 | Stop reason: HOSPADM

## 2023-02-08 RX ORDER — MELATONIN
1000 DAILY
Status: DISCONTINUED | OUTPATIENT
Start: 2023-02-08 | End: 2023-02-16 | Stop reason: HOSPADM

## 2023-02-08 RX ORDER — FINASTERIDE 5 MG/1
5 TABLET, FILM COATED ORAL DAILY
Status: DISCONTINUED | OUTPATIENT
Start: 2023-02-08 | End: 2023-02-16 | Stop reason: HOSPADM

## 2023-02-08 RX ORDER — MORPHINE SULFATE 2 MG/ML
2 INJECTION, SOLUTION INTRAMUSCULAR; INTRAVENOUS
Status: DISCONTINUED | OUTPATIENT
Start: 2023-02-08 | End: 2023-02-08

## 2023-02-08 RX ADMIN — MORPHINE SULFATE 2 MG: 2 INJECTION, SOLUTION INTRAMUSCULAR; INTRAVENOUS at 18:26

## 2023-02-08 RX ADMIN — FINASTERIDE 5 MG: 5 TABLET, FILM COATED ORAL at 08:36

## 2023-02-08 RX ADMIN — DOCUSATE SODIUM 50MG AND SENNOSIDES 8.6MG 2 TABLET: 8.6; 5 TABLET, FILM COATED ORAL at 08:33

## 2023-02-08 RX ADMIN — DOCUSATE SODIUM 50MG AND SENNOSIDES 8.6MG 2 TABLET: 8.6; 5 TABLET, FILM COATED ORAL at 01:19

## 2023-02-08 RX ADMIN — CARVEDILOL 12.5 MG: 12.5 TABLET, FILM COATED ORAL at 08:46

## 2023-02-08 RX ADMIN — HYDROCODONE BITARTRATE AND ACETAMINOPHEN 1 TABLET: 5; 325 TABLET ORAL at 06:30

## 2023-02-08 RX ADMIN — ATORVASTATIN CALCIUM 20 MG: 20 TABLET, FILM COATED ORAL at 08:33

## 2023-02-08 RX ADMIN — TAMSULOSIN HYDROCHLORIDE 0.4 MG: 0.4 CAPSULE ORAL at 20:56

## 2023-02-08 RX ADMIN — Medication 3 MG: at 01:19

## 2023-02-08 RX ADMIN — SERTRALINE 50 MG: 50 TABLET, FILM COATED ORAL at 08:33

## 2023-02-08 RX ADMIN — Medication 1000 UNITS: at 08:33

## 2023-02-08 RX ADMIN — MORPHINE SULFATE 2 MG: 2 INJECTION, SOLUTION INTRAMUSCULAR; INTRAVENOUS at 21:13

## 2023-02-08 RX ADMIN — DOCUSATE SODIUM 50MG AND SENNOSIDES 8.6MG 2 TABLET: 8.6; 5 TABLET, FILM COATED ORAL at 20:56

## 2023-02-08 RX ADMIN — MEMANTINE HYDROCHLORIDE 10 MG: 10 TABLET, FILM COATED ORAL at 08:46

## 2023-02-08 RX ADMIN — HYDROCODONE BITARTRATE AND ACETAMINOPHEN 1 TABLET: 5; 325 TABLET ORAL at 01:19

## 2023-02-08 RX ADMIN — Medication 6 MG: at 20:56

## 2023-02-08 RX ADMIN — MORPHINE SULFATE 2 MG: 2 INJECTION, SOLUTION INTRAMUSCULAR; INTRAVENOUS at 10:12

## 2023-02-08 RX ADMIN — CARVEDILOL 12.5 MG: 12.5 TABLET, FILM COATED ORAL at 18:43

## 2023-02-08 RX ADMIN — LEVOTHYROXINE SODIUM 25 MCG: 0.03 TABLET ORAL at 06:26

## 2023-02-08 RX ADMIN — MEMANTINE HYDROCHLORIDE 10 MG: 10 TABLET, FILM COATED ORAL at 20:57

## 2023-02-08 RX ADMIN — LOSARTAN POTASSIUM 25 MG: 25 TABLET, FILM COATED ORAL at 08:46

## 2023-02-08 RX ADMIN — PANTOPRAZOLE SODIUM 40 MG: 40 TABLET, DELAYED RELEASE ORAL at 06:26

## 2023-02-08 NOTE — PROGRESS NOTES
Clinical Pharmacy Services: Medication History    Cara Garvey is a 88 y.o. male presenting to [facility] for   Chief Complaint   Patient presents with   • Fall   • Shoulder Injury       He  has no past medical history on file.    Allergies as of 02/07/2023 - Reviewed 02/07/2023   Allergen Reaction Noted   • Lisinopril Unknown - High Severity 02/07/2023       Medication information was obtained from: longterm Paperwork  Pharmacy and Phone Number:     Prior to Admission Medications     Prescriptions Last Dose Informant Patient Reported? Taking?    acetaminophen (TYLENOL) 325 MG tablet  Nursing Home Yes Yes    Take 650 mg by mouth Every 8 (Eight) Hours As Needed for Mild Pain.    albuterol sulfate  (90 Base) MCG/ACT inhaler  Nursing Home Yes Yes    Inhale 2 puffs Every 6 (Six) Hours As Needed for Wheezing.    alfuzosin (UROXATRAL) 10 MG 24 hr tablet  Nursing Home Yes Yes    Take 10 mg by mouth Daily.    carvedilol (COREG) 12.5 MG tablet  Nursing Home Yes Yes    Take 12.5 mg by mouth 2 (Two) Times a Day With Meals.    cholecalciferol 25 MCG (1000 UT) tablet  Nursing Home Yes Yes    Take 1,000 Units by mouth Daily.    dextromethorphan-guaifenesin (ROBITUSSIN-DM)  MG/5ML syrup  Nursing Home Yes Yes    Take 5 mL by mouth Every 4 (Four) Hours As Needed (Cough).    finasteride (PROSCAR) 5 MG tablet  Nursing Home Yes Yes    Take 5 mg by mouth Daily.    folic acid (FOLVITE) 1 MG tablet  Nursing Home Yes Yes    Take 1 mg by mouth Daily.    levothyroxine (SYNTHROID, LEVOTHROID) 25 MCG tablet  Nursing Home Yes Yes    Take 25 mcg by mouth Every Morning.    losartan (COZAAR) 25 MG tablet  Nursing Home Yes Yes    Take 25 mg by mouth Daily.    melatonin 3 MG tablet  Nursing Home Yes Yes    Take 6 mg by mouth Every Night.    memantine (NAMENDA) 10 MG tablet  Nursing Home Yes Yes    Take 10 mg by mouth 2 (Two) Times a Day.    omeprazole (priLOSEC) 20 MG capsule  Nursing Home Yes Yes    Take 20 mg by  mouth Daily.    sertraline (ZOLOFT) 50 MG tablet  Nursing Home Yes Yes    Take 50 mg by mouth Daily.    simvastatin (ZOCOR) 40 MG tablet  Nursing Home Yes Yes    Take 40 mg by mouth Daily.            Medication notes:     This medication list is complete to the best of my knowledge as of 2/7/2023    Please call if questions.    Nicole Gomez  Medication History Technician   [phone]    2/7/2023 19:07 EST
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
## 2023-02-08 NOTE — PLAN OF CARE
Goal Outcome Evaluation:  Plan of Care Reviewed With: patient, daughter, family           Outcome Evaluation: Clinical swallow evaluation completed. RN reports difficulties taking meds with thin liquids. Daughter requested to remove soft collar for clinical swallow evaluation; of note, soft collar was placed back on patient following clincial swallow evaluation. During PO trials, pt exhibited uncontrolled movements of upper and lower extremities which family believes to be attributed to current pain medication. RN notified and aware. No overt s/s of aspiration demonstrated with ice chips, thin liquid by spoon/cup/straw, nectar thick liquid by cup/straw, puree, soft solids or mixed consistency. Discoordinated muching mastication and audible swallows noted during regular solid trials. Belching observed after most trials; of note, pt's daughter reports frequent belching at baseline. SLP recommends soft/chopped and thin liquid diet at this time. Meds whole in puree, as tolerated. Sitting upright, slow rate, small bites/sips, multiple swallows. Speech to follow for re-eval.    Patient was not wearing a face mask during this therapy encounter. Therapist used appropriate personal protective equipment including mask, eye protection and gloves.  Mask used was standard procedure mask. Appropriate PPE was worn during the entire therapy session. Hand hygiene was completed before and after therapy session. Patient is not in enhanced droplet precautions.

## 2023-02-08 NOTE — ED NOTES
Nursing report ED to floor  Cara Garvey  88 y.o.  male    HPI :   Chief Complaint   Patient presents with    Fall    Shoulder Injury       Admitting doctor:   Krishna Moon MD    Admitting diagnosis:   The primary encounter diagnosis was Closed fracture of proximal end of right humerus, unspecified fracture morphology, initial encounter. Diagnoses of History of dementia and Compression fracture of thoracic vertebra, unspecified thoracic vertebral level, initial encounter ([facility]) were also pertinent to this visit.    Code status:   Current Code Status       Date Active Code Status Order ID Comments User Context       2/7/2023 2048 CPR (Attempt to Resuscitate) [id number]  Kristy Belle, APRN ED        Question Answer    Code Status (Patient has no pulse and is not breathing) CPR (Attempt to Resuscitate)    Medical Interventions (Patient has pulse or is breathing) Full Support                    Allergies:   Lisinopril    Isolation:   Enhanced Droplet/Contact     Intake and Output  No intake or output data in the 24 hours ending 02/07/23 2110    Weight:       02/07/23  1531   Weight: 63.5 kg (140 lb)       Most recent vitals:   Vitals:    02/07/23 1901 02/07/23 1933 02/07/23 2003 02/07/23 2031   BP: 150/66 133/66 129/58 131/54   BP Location: Right arm      Patient Position: Lying      Pulse: 63 63 65 63   Resp: 18      Temp:       TempSrc:       SpO2: 92% 92% 93% 92%   Weight:       Height:           Active LDAs/IV Access:   Lines, Drains & Airways       Active LDAs       Name Placement date Placement time Site Days    Peripheral IV 02/07/23 1516 Left Antecubital 02/07/23  1516  Antecubital  less than 1                    Labs (abnormal labs have a star):   Labs Reviewed   BASIC METABOLIC PANEL - Abnormal; Notable for the following components:       Result Value    Glucose 137 (*)     Calcium 8.5 (*)     All other components within normal limits    Narrative:     GFR Normal >60  Chronic Kidney Disease <60  Kidney  Failure <15    The GFR formula is only valid for adults with stable renal function between ages 18 and 70.   CBC WITH AUTO DIFFERENTIAL - Abnormal; Notable for the following components:    RBC 3.72 (*)     Hemoglobin 11.5 (*)     Hematocrit 34.6 (*)     Neutrophil % 85.2 (*)     Lymphocyte % 7.7 (*)     Neutrophils, Absolute 9.00 (*)     All other components within normal limits   COVID-19, CARRI IN-HOUSE CEPHEID/PRINCE, NP SWAB IN TRANSPORT MEDIA 8-12 HR TAT - Normal    Narrative:     Fact sheet for providers: https://www.fda.gov/media/956762/download     Fact sheet for patients: https://www.fda.gov/media/015779/download   COVID PRE-OP / PRE-PROCEDURE SCREENING ORDER (NO ISOLATION)    Narrative:     The following orders were created for panel order COVID PRE-OP / PRE-PROCEDURE SCREENING ORDER (NO ISOLATION) - Swab, Nasopharynx.  Procedure                               Abnormality         Status                     ---------                               -----------         ------                     COVID-19, CARRI IN-HOUSE...[[id number]]  Normal              Final result                 Please view results for these tests on the individual orders.   RAINBOW DRAW    Narrative:     The following orders were created for panel order [facility] Draw.  Procedure                               Abnormality         Status                     ---------                               -----------         ------                     Green Top (Gel)[[id number]]                                  Final result               Lavender Top[[id number]]                                     Final result               Gold Top - SST[[id number]]                                   Final result               Light Blue Top[[id number]]                                   Final result                 Please view results for these tests on the individual orders.   GREEN TOP   LAVENDER TOP   GOLD TOP - SST   LIGHT BLUE TOP   CBC AND DIFFERENTIAL    Narrative:      The following orders were created for panel order CBC & Differential.  Procedure                               Abnormality         Status                     ---------                               -----------         ------                     CBC Auto Differential[[id number]]        Abnormal            Final result                 Please view results for these tests on the individual orders.       EKG:   No orders to display       Meds given in ED:   Medications   sodium chloride 0.9 % flush 10 mL (has no administration in time range)   acetaminophen (TYLENOL) tablet 650 mg (has no administration in time range)   melatonin tablet 3 mg (has no administration in time range)   sennosides-docusate (PERICOLACE) 8.6-50 MG per tablet 2 tablet (has no administration in time range)     And   polyethylene glycol (MIRALAX) packet 17 g (has no administration in time range)     And   bisacodyl (DULCOLAX) EC tablet 5 mg (has no administration in time range)     And   bisacodyl (DULCOLAX) suppository 10 mg (has no administration in time range)   ondansetron (ZOFRAN) tablet 4 mg (has no administration in time range)     Or   ondansetron (ZOFRAN) injection 4 mg (has no administration in time range)   aluminum-magnesium hydroxide-simethicone (MAALOX MAX) 400-400-40 MG/5ML suspension 15 mL (has no administration in time range)   HYDROcodone-acetaminophen (NORCO) 5-325 MG per tablet 1 tablet (1 tablet Oral Given 2/7/23 2106)   traMADol (ULTRAM) tablet 50 mg (50 mg Oral Given 2/7/23 1629)   acetaminophen (TYLENOL) tablet 1,000 mg (1,000 mg Oral Given 2/7/23 1808)       Imaging results:  XR Shoulder 2+ View Right    Result Date: 2/7/2023   Proximal right humerus fracture.  This report was finalized on 2/7/2023 2:55 PM by Dr. Eddie Jones M.D.      CT Head Without Contrast    Result Date: 2/7/2023   No evidence for acute traumatic intracranial pathology.    TECHNIQUE: CT scan of the cervical spine was obtained with 1 mm axial  bone algorithm and 2 mm axial soft tissue algorithm images. Sagittal and coronal reconstructed images were obtained.  FINDINGS:  The patient apparently has a history of a C2 fracture. There are no prior cervical spine imaging studies currently available for comparison. There is an age-indeterminate deformity within the base of the odontoid process. Unfortunately, as there are no previous studies available for comparison, any change from the patient's baseline cannot be assessed on the basis of this study. If older studies are made available for comparison, an addendum will be gladly generated.  There are mild age-indeterminate compression deformity seen at T2 and T3.  There is anterior spondylolisthesis of C4 on C5 by approximately 3 mm. Advanced degenerative disc changes are seen at the C5-6 level. A disc osteophyte complex at C5-C6 results in a mild degree of canal stenosis. Bulging disc material at C3-4 and C4-5 results in up to a moderate-to-severe degree of canal stenosis at the C4-5 level. There is probable cord compression at this level. Foraminal stenotic changes are most prominently seen at the C3-4, C4-5, and C5-6 levels. There is osseous fusion noted across the C6-7 disc space presumably representing a prior anterior cervical discectomy and fusion procedure.  There is an indeterminate hypodense nodule within the right lobe of the thyroid measuring up to 2.4 cm in diameter. This could be further evaluated with sonography, only if clinically indicated.  IMPRESSION:  There is a deformity at the level of the base of the odontoid process. The patient reportedly has a history of an odontoid fracture. However, the appearance of the odontoid with respect to its baseline appearance is not assessed as there are no previous cervical spine imaging studies currently available for comparison. If these images are submitted for comparison, an addendum would be gladly generated. Unfortunately, on the basis of these  images, an acute injury to the odontoid could not be excluded.  There is mild vertebral body height loss at the T2 and T3 levels. These compression deformities are age indeterminate. Further temporal characterization could be made with MR imaging, as clinically indicated.  There is degenerative phenomena within the cervical spine that has been discussed in detail above. There is up to a moderate-to-severe degree of central canal stenosis at the C4-5 level secondary to a disc bulge with probable cord compression.  There is osseous fusion seen across the C6-7 disc space that is presumably from a prior anterior cervical discectomy and fusion procedure.  There is an indeterminate 2.4 cm hypodense nodule within the right lobe of the thyroid which could be further evaluated with thyroid sonography, only if clinically indicated.  These findings and recommendations were discussed with Dr. Lee Blanchard on 02/07/2023 at approximately 6:12 PM.   Radiation dose reduction techniques were utilized, including automated exposure control and exposure modulation based on body size.  This report was finalized on 2/7/2023 6:29 PM by Dr. Roby Mendez M.D.      CT Cervical Spine Without Contrast    Result Date: 2/7/2023   No evidence for acute traumatic intracranial pathology.    TECHNIQUE: CT scan of the cervical spine was obtained with 1 mm axial bone algorithm and 2 mm axial soft tissue algorithm images. Sagittal and coronal reconstructed images were obtained.  FINDINGS:  The patient apparently has a history of a C2 fracture. There are no prior cervical spine imaging studies currently available for comparison. There is an age-indeterminate deformity within the base of the odontoid process. Unfortunately, as there are no previous studies available for comparison, any change from the patient's baseline cannot be assessed on the basis of this study. If older studies are made available for comparison, an addendum will be gladly generated.   There are mild age-indeterminate compression deformity seen at T2 and T3.  There is anterior spondylolisthesis of C4 on C5 by approximately 3 mm. Advanced degenerative disc changes are seen at the C5-6 level. A disc osteophyte complex at C5-C6 results in a mild degree of canal stenosis. Bulging disc material at C3-4 and C4-5 results in up to a moderate-to-severe degree of canal stenosis at the C4-5 level. There is probable cord compression at this level. Foraminal stenotic changes are most prominently seen at the C3-4, C4-5, and C5-6 levels. There is osseous fusion noted across the C6-7 disc space presumably representing a prior anterior cervical discectomy and fusion procedure.  There is an indeterminate hypodense nodule within the right lobe of the thyroid measuring up to 2.4 cm in diameter. This could be further evaluated with sonography, only if clinically indicated.  IMPRESSION:  There is a deformity at the level of the base of the odontoid process. The patient reportedly has a history of an odontoid fracture. However, the appearance of the odontoid with respect to its baseline appearance is not assessed as there are no previous cervical spine imaging studies currently available for comparison. If these images are submitted for comparison, an addendum would be gladly generated. Unfortunately, on the basis of these images, an acute injury to the odontoid could not be excluded.  There is mild vertebral body height loss at the T2 and T3 levels. These compression deformities are age indeterminate. Further temporal characterization could be made with MR imaging, as clinically indicated.  There is degenerative phenomena within the cervical spine that has been discussed in detail above. There is up to a moderate-to-severe degree of central canal stenosis at the C4-5 level secondary to a disc bulge with probable cord compression.  There is osseous fusion seen across the C6-7 disc space that is presumably from a prior  anterior cervical discectomy and fusion procedure.  There is an indeterminate 2.4 cm hypodense nodule within the right lobe of the thyroid which could be further evaluated with thyroid sonography, only if clinically indicated.  These findings and recommendations were discussed with Dr. Lee Blanchard on 02/07/2023 at approximately 6:12 PM.   Radiation dose reduction techniques were utilized, including automated exposure control and exposure modulation based on body size.  This report was finalized on 2/7/2023 6:29 PM by Dr. Roby Mendez M.D.       Ambulatory status:   - bedrest.     Social issues:   Social History     Socioeconomic History    Marital status: Single       NIH Stroke Scale:         Gretta Duvall RN  02/07/23 21:10 EST

## 2023-02-08 NOTE — H&P
Patient Name:  Cara Garvey  YOB: 1934  MRN:  [id number]  Admit Date:  2/7/2023  Patient Care Team:  Princess Yi MD as PCP - General (Internal Medicine)      Subjective   History Present Illness     Chief Complaint   Patient presents with   • Fall   • Shoulder Injury     History of Present Illness   Mr. Garvey is a 88 y.o. non-smoker with a history of CAD s/p stent to LAD, HTN, HLD, and GERD that presents to [facility] secondary to shoulder pain.  HPI has been obtained from ER documentation due to patient's mentation.  Patient resides in the memory care unit and today he had a unwitnessed fall.  In the ED, patient complained of right shoulder, head, and neck pain.  He recently had a C2 fracture in July and has been in a soft collar.  Imaging today shows a right humerus fracture.  He has been admitted also for evaluation and treatment.    Review of Systems   Unable to perform ROS: Dementia        Personal History     No past medical history on file.  No past surgical history on file.  No family history on file.  Social History     Substance Use Topics   • Alcohol use: Not Currently     Comment: Has been in a nursing home for over 3 months   • Drug use: Never     No current facility-administered medications on file prior to encounter.     Current Outpatient Medications on File Prior to Encounter   Medication Sig Dispense Refill   • acetaminophen (TYLENOL) 325 MG tablet Take 650 mg by mouth Every 8 (Eight) Hours As Needed for Mild Pain.     • albuterol sulfate  (90 Base) MCG/ACT inhaler Inhale 2 puffs Every 6 (Six) Hours As Needed for Wheezing.     • alfuzosin (UROXATRAL) 10 MG 24 hr tablet Take 10 mg by mouth Daily.     • carvedilol (COREG) 12.5 MG tablet Take 12.5 mg by mouth 2 (Two) Times a Day With Meals.     • cholecalciferol 25 MCG (1000 UT) tablet Take 1,000 Units by mouth Daily.     • dextromethorphan-guaifenesin (ROBITUSSIN-DM)  MG/5ML syrup Take  5 mL by mouth Every 4 (Four) Hours As Needed (Cough).     • finasteride (PROSCAR) 5 MG tablet Take 5 mg by mouth Daily.     • folic acid (FOLVITE) 1 MG tablet Take 1 mg by mouth Daily.     • levothyroxine (SYNTHROID, LEVOTHROID) 25 MCG tablet Take 25 mcg by mouth Every Morning.     • losartan (COZAAR) 25 MG tablet Take 25 mg by mouth Daily.     • melatonin 3 MG tablet Take 6 mg by mouth Every Night.     • memantine (NAMENDA) 10 MG tablet Take 10 mg by mouth 2 (Two) Times a Day.     • omeprazole (priLOSEC) 20 MG capsule Take 20 mg by mouth Daily.     • sertraline (ZOLOFT) 50 MG tablet Take 50 mg by mouth Daily.     • simvastatin (ZOCOR) 40 MG tablet Take 40 mg by mouth Daily.       Allergies   Allergen Reactions   • Lisinopril Unknown - High Severity       Objective    Objective     Vital Signs  Temp:  [96.5 °F (35.8 °C)-98.3 °F (36.8 °C)] 98.3 °F (36.8 °C)  Heart Rate:  [51-65] 57  Resp:  [18] 18  BP: (100-151)/(47-66) 128/50  SpO2:  [92 %-96 %] 93 %  on   ;   Device (Oxygen Therapy): room air  Body mass index is 27.49 kg/m².    Physical Exam  Vitals and nursing note reviewed.   Constitutional:       General: He is not in acute distress.     Appearance: He is well-developed. He is not toxic-appearing.      Comments: Chronically ill-appearing, he is complaining of pain in his shoulder   HENT:      Head: Normocephalic and atraumatic.   Eyes:      General: No scleral icterus.        Right eye: No discharge.         Left eye: No discharge.      Conjunctiva/sclera: Conjunctivae normal.   Neck:      Vascular: No JVD.   Cardiovascular:      Rate and Rhythm: Normal rate and regular rhythm.      Heart sounds: Normal heart sounds. No murmur heard.    No friction rub. No gallop.   Pulmonary:      Effort: Pulmonary effort is normal. No respiratory distress.      Breath sounds: Normal breath sounds. No wheezing or rales.   Abdominal:      General: Bowel sounds are normal. There is no distension.      Palpations: Abdomen is soft.       Tenderness: There is no abdominal tenderness. There is no guarding.   Musculoskeletal:         General: Tenderness present. No deformity. Normal range of motion.      Cervical back: Normal range of motion and neck supple.   Skin:     General: Skin is warm and dry.      Capillary Refill: Capillary refill takes less than 2 seconds.   Neurological:      Mental Status: He is alert. He is disoriented and confused.   Psychiatric:         Behavior: Behavior normal.     Results Review:  I reviewed the patient's new clinical results.  I reviewed the patient's new imaging results and agree with the interpretation.  I reviewed the patient's other test results and agree with the interpretation  I personally viewed and interpreted the patient's EKG/Telemetry data  Discussed with ED provider.    Lab Results (last 24 hours)     Procedure Component Value Units Date/Time    CBC & Differential [[id number]]  (Abnormal) Collected: 02/07/23 1516    Specimen: Blood Updated: 02/07/23 1827    Narrative:      The following orders were created for panel order CBC & Differential.  Procedure                               Abnormality         Status                     ---------                               -----------         ------                     CBC Auto Differential[[id number]]        Abnormal            Final result                 Please view results for these tests on the individual orders.    Basic Metabolic Panel [[id number]]  (Abnormal) Collected: 02/07/23 1516    Specimen: Blood Updated: 02/07/23 1946     Glucose 137 mg/dL      BUN 20 mg/dL      Creatinine 1.02 mg/dL      Sodium 137 mmol/L      Potassium 3.8 mmol/L      Chloride 104 mmol/L      CO2 27.0 mmol/L      Calcium 8.5 mg/dL      BUN/Creatinine Ratio 19.6     Anion Gap 6.0 mmol/L      eGFR 70.7 mL/min/1.73     Narrative:      GFR Normal >60  Chronic Kidney Disease <60  Kidney Failure <15    The GFR formula is only valid for adults with stable renal function between  ages 18 and 70.    CBC Auto Differential [[id number]]  (Abnormal) Collected: 02/07/23 1516    Specimen: Blood Updated: 02/07/23 1827     WBC 10.55 10*3/mm3      RBC 3.72 10*6/mm3      Hemoglobin 11.5 g/dL      Hematocrit 34.6 %      MCV 93.0 fL      MCH 30.9 pg      MCHC 33.2 g/dL      RDW 12.4 %      RDW-SD 41.9 fl      MPV 10.1 fL      Platelets 179 10*3/mm3      Neutrophil % 85.2 %      Lymphocyte % 7.7 %      Monocyte % 5.5 %      Eosinophil % 0.8 %      Basophil % 0.3 %      Immature Grans % 0.5 %      Neutrophils, Absolute 9.00 10*3/mm3      Lymphocytes, Absolute 0.81 10*3/mm3      Monocytes, Absolute 0.58 10*3/mm3      Eosinophils, Absolute 0.08 10*3/mm3      Basophils, Absolute 0.03 10*3/mm3      Immature Grans, Absolute 0.05 10*3/mm3      nRBC 0.0 /100 WBC     COVID PRE-OP / PRE-PROCEDURE SCREENING ORDER (NO ISOLATION) - Swab, Nasopharynx [[id number]]  (Normal) Collected: 02/07/23 1808    Specimen: Swab from Nasopharynx Updated: 02/07/23 1859    Narrative:      The following orders were created for panel order COVID PRE-OP / PRE-PROCEDURE SCREENING ORDER (NO ISOLATION) - Swab, Nasopharynx.  Procedure                               Abnormality         Status                     ---------                               -----------         ------                     COVID-19,[facility]HOU[facility]...[[id number]]  Normal              Final result                 Please view results for these tests on the individual orders.    COVID-19,[facility] CEPHEID/PRINCE NP SWAB IN TRANSPORT MEDIA 8-12 HR TAT - Swab, Nasopharynx [[id number]]  (Normal) Collected: 02/07/23 1808    Specimen: Swab from Nasopharynx Updated: 02/07/23 1859     COVID19 Not Detected    Narrative:      Fact sheet for providers: https://www.fda.gov/media/947729/download     Fact sheet for patients: https://www.fda.gov/media/492507/download          Imaging Results (Last 24 Hours)     Procedure Component Value Units Date/Time    CT Head Without Contrast  [[id number]] Collected: 02/07/23 1826     Updated: 02/07/23 1833    Narrative:      CT HEAD AND CERVICAL SPINE WITHOUT CONTRAST     CLINICAL HISTORY: Unwitnessed fall. Neck pain. History of C2 fracture in  July 2022.     TECHNIQUE: CT scan of the head was obtained with 3 mm axial soft tissue  and 2 mm bone algorithm images. No intravenous contrast was  administered. Sagittal and coronal reconstructions were obtained.     COMPARISON: No previous similar studies are currently available for  comparison.     FINDINGS:       There is no evidence for a calvarial fracture and there is no evidence  for an acute extra-axial hemorrhage. The ventricles, sulci, and cisterns  are age-appropriate. The gray-white matter differentiation is within  normal limits. The basal ganglia and thalami are unremarkable in  appearance. The posterior fossa structures are within normal limits.  Atherosclerotic calcifications are incidentally appreciated within the  intracranial vasculature. Mild changes of chronic small vessel ischemic  phenomena are incidentally noted.     There is mild mucosal thickening noted within the left aspect of the  frontal sinus and this extends into the adjacent frontal ethmoidal  recess.       Impression:         No evidence for acute traumatic intracranial pathology.           TECHNIQUE: CT scan of the cervical spine was obtained with 1 mm axial  bone algorithm and 2 mm axial soft tissue algorithm images. Sagittal and  coronal reconstructed images were obtained.     FINDINGS:     The patient apparently has a history of a C2 fracture. There are no  prior cervical spine imaging studies currently available for comparison.  There is an age-indeterminate deformity within the base of the odontoid  process. Unfortunately, as there are no previous studies available for  comparison, any change from the patient's baseline cannot be assessed on  the basis of this study. If older studies are made available for  comparison, an  addendum will be gladly generated.     There are mild age-indeterminate compression deformity seen at T2 and  T3.     There is anterior spondylolisthesis of C4 on C5 by approximately 3 mm.  Advanced degenerative disc changes are seen at the C5-6 level. A disc  osteophyte complex at C5-C6 results in a mild degree of canal stenosis.  Bulging disc material at C3-4 and C4-5 results in up to a  moderate-to-severe degree of canal stenosis at the C4-5 level. There is  probable cord compression at this level. Foraminal stenotic changes are  most prominently seen at the C3-4, C4-5, and C5-6 levels. There is  osseous fusion noted across the C6-7 disc space presumably representing  a prior anterior cervical discectomy and fusion procedure.     There is an indeterminate hypodense nodule within the right lobe of the  thyroid measuring up to 2.4 cm in diameter. This could be further  evaluated with sonography, only if clinically indicated.     IMPRESSION:     There is a deformity at the level of the base of the odontoid process.  The patient reportedly has a history of an odontoid fracture. However,  the appearance of the odontoid with respect to its baseline appearance  is not assessed as there are no previous cervical spine imaging studies  currently available for comparison. If these images are submitted for  comparison, an addendum would be gladly generated. Unfortunately, on the  basis of these images, an acute injury to the odontoid could not be  excluded.     There is mild vertebral body height loss at the T2 and T3 levels. These  compression deformities are age indeterminate. Further temporal  characterization could be made with MR imaging, as clinically indicated.     There is degenerative phenomena within the cervical spine that has been  discussed in detail above. There is up to a moderate-to-severe degree of  central canal stenosis at the C4-5 level secondary to a disc bulge with  probable cord compression.     There  is osseous fusion seen across the C6-7 disc space that is  presumably from a prior anterior cervical discectomy and fusion  procedure.     There is an indeterminate 2.4 cm hypodense nodule within the right lobe  of the thyroid which could be further evaluated with thyroid sonography,  only if clinically indicated.     These findings and recommendations were discussed with Dr. Lee Blanchard on  02/07/2023 at approximately 6:12 PM.        Radiation dose reduction techniques were utilized, including automated  exposure control and exposure modulation based on body size.     This report was finalized on 2/7/2023 6:29 PM by Dr. Roby Mendez M.D.       CT Cervical Spine Without Contrast [[id number]] Collected: 02/07/23 1826     Updated: 02/07/23 1833    Narrative:      CT HEAD AND CERVICAL SPINE WITHOUT CONTRAST     CLINICAL HISTORY: Unwitnessed fall. Neck pain. History of C2 fracture in  July 2022.     TECHNIQUE: CT scan of the head was obtained with 3 mm axial soft tissue  and 2 mm bone algorithm images. No intravenous contrast was  administered. Sagittal and coronal reconstructions were obtained.     COMPARISON: No previous similar studies are currently available for  comparison.     FINDINGS:       There is no evidence for a calvarial fracture and there is no evidence  for an acute extra-axial hemorrhage. The ventricles, sulci, and cisterns  are age-appropriate. The gray-white matter differentiation is within  normal limits. The basal ganglia and thalami are unremarkable in  appearance. The posterior fossa structures are within normal limits.  Atherosclerotic calcifications are incidentally appreciated within the  intracranial vasculature. Mild changes of chronic small vessel ischemic  phenomena are incidentally noted.     There is mild mucosal thickening noted within the left aspect of the  frontal sinus and this extends into the adjacent frontal ethmoidal  recess.       Impression:         No evidence for acute traumatic  intracranial pathology.           TECHNIQUE: CT scan of the cervical spine was obtained with 1 mm axial  bone algorithm and 2 mm axial soft tissue algorithm images. Sagittal and  coronal reconstructed images were obtained.     FINDINGS:     The patient apparently has a history of a C2 fracture. There are no  prior cervical spine imaging studies currently available for comparison.  There is an age-indeterminate deformity within the base of the odontoid  process. Unfortunately, as there are no previous studies available for  comparison, any change from the patient's baseline cannot be assessed on  the basis of this study. If older studies are made available for  comparison, an addendum will be gladly generated.     There are mild age-indeterminate compression deformity seen at T2 and  T3.     There is anterior spondylolisthesis of C4 on C5 by approximately 3 mm.  Advanced degenerative disc changes are seen at the C5-6 level. A disc  osteophyte complex at C5-C6 results in a mild degree of canal stenosis.  Bulging disc material at C3-4 and C4-5 results in up to a  moderate-to-severe degree of canal stenosis at the C4-5 level. There is  probable cord compression at this level. Foraminal stenotic changes are  most prominently seen at the C3-4, C4-5, and C5-6 levels. There is  osseous fusion noted across the C6-7 disc space presumably representing  a prior anterior cervical discectomy and fusion procedure.     There is an indeterminate hypodense nodule within the right lobe of the  thyroid measuring up to 2.4 cm in diameter. This could be further  evaluated with sonography, only if clinically indicated.     IMPRESSION:     There is a deformity at the level of the base of the odontoid process.  The patient reportedly has a history of an odontoid fracture. However,  the appearance of the odontoid with respect to its baseline appearance  is not assessed as there are no previous cervical spine imaging studies  currently  available for comparison. If these images are submitted for  comparison, an addendum would be gladly generated. Unfortunately, on the  basis of these images, an acute injury to the odontoid could not be  excluded.     There is mild vertebral body height loss at the T2 and T3 levels. These  compression deformities are age indeterminate. Further temporal  characterization could be made with MR imaging, as clinically indicated.     There is degenerative phenomena within the cervical spine that has been  discussed in detail above. There is up to a moderate-to-severe degree of  central canal stenosis at the C4-5 level secondary to a disc bulge with  probable cord compression.     There is osseous fusion seen across the C6-7 disc space that is  presumably from a prior anterior cervical discectomy and fusion  procedure.     There is an indeterminate 2.4 cm hypodense nodule within the right lobe  of the thyroid which could be further evaluated with thyroid sonography,  only if clinically indicated.     These findings and recommendations were discussed with Dr. Lee Blanchard on  02/07/2023 at approximately 6:12 PM.        Radiation dose reduction techniques were utilized, including automated  exposure control and exposure modulation based on body size.     This report was finalized on 2/7/2023 6:29 PM by Dr. Roby Mendez M.D.       XR Shoulder 2+ View Right [[id number]] Collected: 02/07/23 1454     Updated: 02/07/23 1458    Narrative:      XR SHOULDER 2+ VW RIGHT-     INDICATIONS: Trauma     TECHNIQUE: 2 views of the right shoulder     COMPARISON: None available     FINDINGS:     Comminuted fracture of the proximal right humerus is noted, with as much  as about 3 mm cortical step-off. No other fractures are identified.  Moderate hypertrophic degenerative change is apparent at the  acromioclavicular joint. No dislocation.       Impression:         Proximal right humerus fracture.     This report was finalized on 2/7/2023 2:55 PM  by Dr. Eddie Jones M.D.                 No orders to display        Assessment/Plan     Active Hospital Problems    Diagnosis  POA   • **Closed fracture of proximal end of right humerus, unspecified fracture morphology, initial encounter [S42.201A]  Yes   • CAD (coronary artery disease) [I25.10]  Yes   • GERD (gastroesophageal reflux disease) [K21.9]  Yes   • HTN (hypertension) [I10]  Yes   • HLD (hyperlipidemia) [E78.5]  Yes      Resolved Hospital Problems   No resolved problems to display.       Mr. Garvey is a 88 y.o. non-smoker with a history of CAD, HTN, and HLD who presents with a right shoulder pain secondary to mechanical fall.    Right humerus fx  -Continue sling to arm   -Orthopedic consult  -Supportive care with analgesics  -PT to treat and eval     Dementia  -Currently resides in a memory care unit.  We will consult CCP  -Resume Namenda    CAD  -Status post stent placement to LAD    Essential hypertension  -Stable, resume home regimen     HLD  -Continue statin therapy     Anemia  -Hg 11.5, no s/s of bleeding  -Repeat CBC in a.m.     BPH  -Resume finasteride    Hypothyroidism  -Continue Synthroid    GERD  -Continue PPI      I discussed the patient's findings and my recommendations with patient and ED provider.    VTE Prophylaxis - SCDs.  Code Status - Full code.       LEWIS Hallman  [facility]  02/08/23  01:39 EST

## 2023-02-08 NOTE — CONSULTS
ORTHOPEDIC SURGERY CONSULT      Patient: Cara Garvey  Date of Admission: 2/7/2023  3:01 PM  YOB: 1934  Medical Record Number: [id number]  Attending Physician: Levar Murillo MD  Consulting Provider: LEWIS El    CHIEF COMPLIANT: Right shoulder pain    HISTORY OF PRESENT ILLINESS: Patient is a 88 y.o. year old male presents to [facility] with above complaints.  Mr. Garvey is an 88-year-old male who resides in a memory care unit in a local facility.  Apparently had an unwitnessed fall which resulted in significant pain in his right shoulder.  He was brought to the emergency department for evaluation where initial radiographs demonstrated a proximal humerus fracture.  Orthopedics was consulted for further evaluation and treatment.  On exam this morning, his pain is fairly significant as he has just been placed into a sling and just been given a dose of his pain medication within the 5 minutes previous.  The patient does not recall events immediately surrounding his fall due to his baseline dementia.  Pain is localized to the shoulder and he has no other complaints.  He describes his pain as sharp and spasming.    ALLERGIES:   Allergies   Allergen Reactions   • Lisinopril Unknown - High Severity       HOME MEDICATIONS:  Medications Prior to Admission   Medication Sig Dispense Refill Last Dose   • acetaminophen (TYLENOL) 325 MG tablet Take 650 mg by mouth Every 8 (Eight) Hours As Needed for Mild Pain.      • albuterol sulfate  (90 Base) MCG/ACT inhaler Inhale 2 puffs Every 6 (Six) Hours As Needed for Wheezing.      • alfuzosin (UROXATRAL) 10 MG 24 hr tablet Take 10 mg by mouth Daily.      • carvedilol (COREG) 12.5 MG tablet Take 12.5 mg by mouth 2 (Two) Times a Day With Meals.      • cholecalciferol 25 MCG (1000 UT) tablet Take 1,000 Units by mouth Daily.      • dextromethorphan-guaifenesin (ROBITUSSIN-DM)  MG/5ML syrup Take 5 mL by mouth Every 4  (Four) Hours As Needed (Cough).      • finasteride (PROSCAR) 5 MG tablet Take 5 mg by mouth Daily.      • folic acid (FOLVITE) 1 MG tablet Take 1 mg by mouth Daily.      • levothyroxine (SYNTHROID, LEVOTHROID) 25 MCG tablet Take 25 mcg by mouth Every Morning.      • losartan (COZAAR) 25 MG tablet Take 25 mg by mouth Daily.      • melatonin 3 MG tablet Take 6 mg by mouth Every Night.      • memantine (NAMENDA) 10 MG tablet Take 10 mg by mouth 2 (Two) Times a Day.      • omeprazole (priLOSEC) 20 MG capsule Take 20 mg by mouth Daily.      • sertraline (ZOLOFT) 50 MG tablet Take 50 mg by mouth Daily.      • simvastatin (ZOCOR) 40 MG tablet Take 40 mg by mouth Daily.          CURRENT MEDICATIONS:  Scheduled Meds:atorvastatin, 20 mg, Oral, Daily  carvedilol, 12.5 mg, Oral, BID With Meals  cholecalciferol, 1,000 Units, Oral, Daily  finasteride, 5 mg, Oral, Daily  levothyroxine, 25 mcg, Oral, Q AM  losartan, 25 mg, Oral, Daily  melatonin, 6 mg, Oral, Nightly  memantine, 10 mg, Oral, Q12H  pantoprazole, 40 mg, Oral, Q AM  senna-docusate sodium, 2 tablet, Oral, BID  sertraline, 50 mg, Oral, Daily  tamsulosin, 0.4 mg, Oral, Nightly      Continuous Infusions:   PRN Meds:.•  acetaminophen  •  aluminum-magnesium hydroxide-simethicone  •  senna-docusate sodium **AND** polyethylene glycol **AND** bisacodyl **AND** bisacodyl  •  HYDROcodone-acetaminophen  •  melatonin  •  Morphine  •  ondansetron **OR** ondansetron  •  sodium chloride    History reviewed. No pertinent past medical history.  History reviewed. No pertinent surgical history.  Social History     Occupational History   • Not on file   Tobacco Use   • Smoking status: Never   • Smokeless tobacco: Not on file   Substance and Sexual Activity   • Alcohol use: Not Currently     Comment: Has been in a nursing home for over 3 months   • Drug use: Never   • Sexual activity: Not Currently     Partners: Female      Social History     Social History Narrative   • Not on file  "    History reviewed. No pertinent family history.    REVIEW OF SYSTEMS:    HEENT: Patient denies any headaches, vision changes, change in hearing, or tinnitus, Patient denies epistaxis, sinus pain, hoarseness, or dysphagia   Pulmonary: Patient denies any cough, congestion, acute change in SOA or wheezing.   Cardiovascular: Patient denies any change in chest pain, dyspnea, palpitations, weakness, intolerance of exercise, varicosities, change in murmur   Gastrointestinal:  Patient denies change in appetite, melena, change in bowel habits.   Genital/Urinary: Patient denies dysuria, change in color of urine, change in frequency of urination, pain with urgency, change in incontinence, retention.   Musculoskeletal: Patient denies complaints of acute changes in symptoms of other joints not mentioned above.   Neurological: Patient denies changes in dizziness, tremor, ataxia, or difficulty in speaking or changes in memory.   Endocrine system: Patient denies acute changes in tremors, palpitations, polyuria, polydipsia, polyphagia, diaphoresis, exophthalmos, or goiter.   Psychological: Patient denies thoughts/plans or harming self or other; denies acute changes in depression,  insomnia, night terrors, melissa, disorientation.   Skin: Patient denies any bruising, rashes, discoloration, pruritus,or wounds not mentioned in history of present illness or chief complaint above.   Hematopoietic: Patient denies current bleeding, epistaxis, hematuria, or melena.    PHYSICAL EXAM:   Vitals:  Vitals:    02/07/23 2031 02/07/23 2131 02/07/23 2216 02/08/23 0630   BP: 131/54 120/53 128/50 122/64   BP Location:   Left arm Left arm   Patient Position:   Lying Lying   Pulse: 63 59 57 61   Resp:   18 16   Temp:   98.3 °F (36.8 °C) 97 °F (36.1 °C)   TempSrc:   Oral Oral   SpO2: 92% 92% 93% 92%   Weight:   79.6 kg (175 lb 7.8 oz)    Height:   170.2 cm (67\")        General:  88 y.o. male who appears about stated age.    Alert, cooperative, in no " acute distress         Head:    Normocephalic, without obvious abnormality, atraumatic   Eyes:            Lids and lashes normal, conjunctivae and sclerae normal, no         icterus, no pallor, corneas clear, PERRLA   Ears:    Ears appear intact with no abnormalities noted   Throat:   No oral lesions, no thrush, oral mucosa moist   Neck:   No adenopathy, supple, trachea midline, no JVD   Back:     Limited exam shows no severe kyphosis present,no visible           erythema, no excessive  tenderness to palpation.    Lungs:     Respirations regular, even and unlabored.     Heart:    Normal rate, Pulses palpable   Chest Wall:    No abnormalities observed.   Abdomen:     Normal bowel sounds, no masses, no organomegaly, soft              non-tender, non-distended, no guarding, no rebound                      tenderness   Rectal:     Deferred   Pulses:   Pulses palpable and equal bilaterally   Skin:   No bleeding, bruising or rash   Lymph nodes:   No palpable adenopathy   Extremities:     Focused exam of the right upper extremity reveals skin is warm, dry, and intact.  No grossly observable malalignment.  There is appreciable generalized soft tissue swelling.  Per the patient's report, sensation is maintained in the axillary, radial, median, and ulnar nerve distributions of the right upper extremity.  He is able to make a composite fist.  Radial pulse 2+.  The hand is warm and well-perfused    DIAGNOSTIC TEST:  Admission on 02/07/2023   Component Date Value Ref Range Status   • Extra Tube 02/07/2023 Hold for add-ons.   Final    Auto resulted.   • Extra Tube 02/07/2023 hold for add-on   Final    Auto resulted   • Extra Tube 02/07/2023 Hold for add-ons.   Final    Auto resulted.   • Extra Tube 02/07/2023 Hold for add-ons.   Final    Auto resulted   • Glucose 02/07/2023 137 (H)  65 - 99 mg/dL Final   • BUN 02/07/2023 20  8 - 23 mg/dL Final   • Creatinine 02/07/2023 1.02  0.76 - 1.27 mg/dL Final   • Sodium 02/07/2023 137  136  - 145 mmol/L Final   • Potassium 02/07/2023 3.8  3.5 - 5.2 mmol/L Final   • Chloride 02/07/2023 104  98 - 107 mmol/L Final   • CO2 02/07/2023 27.0  22.0 - 29.0 mmol/L Final   • Calcium 02/07/2023 8.5 (L)  8.6 - 10.5 mg/dL Final   • BUN/Creatinine Ratio 02/07/2023 19.6  7.0 - 25.0 Final   • Anion Gap 02/07/2023 6.0  5.0 - 15.0 mmol/L Final   • eGFR 02/07/2023 70.7  >60.0 mL/min/1.73 Final   • COVID19 02/07/2023 Not Detected  Not Detected - Ref. Range Final   • WBC 02/07/2023 10.55  3.40 - 10.80 10*3/mm3 Final   • RBC 02/07/2023 3.72 (L)  4.14 - 5.80 10*6/mm3 Final   • Hemoglobin 02/07/2023 11.5 (L)  13.0 - 17.7 g/dL Final   • Hematocrit 02/07/2023 34.6 (L)  37.5 - 51.0 % Final   • MCV 02/07/2023 93.0  79.0 - 97.0 fL Final   • MCH 02/07/2023 30.9  26.6 - 33.0 pg Final   • MCHC 02/07/2023 33.2  31.5 - 35.7 g/dL Final   • RDW 02/07/2023 12.4  12.3 - 15.4 % Final   • RDW-SD 02/07/2023 41.9  37.0 - 54.0 fl Final   • MPV 02/07/2023 10.1  6.0 - 12.0 fL Final   • Platelets 02/07/2023 179  140 - 450 10*3/mm3 Final   • Neutrophil % 02/07/2023 85.2 (H)  42.7 - 76.0 % Final   • Lymphocyte % 02/07/2023 7.7 (L)  19.6 - 45.3 % Final   • Monocyte % 02/07/2023 5.5  5.0 - 12.0 % Final   • Eosinophil % 02/07/2023 0.8  0.3 - 6.2 % Final   • Basophil % 02/07/2023 0.3  0.0 - 1.5 % Final   • Immature Grans % 02/07/2023 0.5  0.0 - 0.5 % Final   • Neutrophils, Absolute 02/07/2023 9.00 (H)  1.70 - 7.00 10*3/mm3 Final   • Lymphocytes, Absolute 02/07/2023 0.81  0.70 - 3.10 10*3/mm3 Final   • Monocytes, Absolute 02/07/2023 0.58  0.10 - 0.90 10*3/mm3 Final   • Eosinophils, Absolute 02/07/2023 0.08  0.00 - 0.40 10*3/mm3 Final   • Basophils, Absolute 02/07/2023 0.03  0.00 - 0.20 10*3/mm3 Final   • Immature Grans, Absolute 02/07/2023 0.05  0.00 - 0.05 10*3/mm3 Final   • nRBC 02/07/2023 0.0  0.0 - 0.2 /100 WBC Final       XR SHOULDER 2+ VW RIGHT-    INDICATIONS: Trauma    TECHNIQUE: 2 views of the right shoulder    COMPARISON: None  available    FINDINGS:    Comminuted fracture of the proximal right humerus is noted, with as much  as about 3 mm cortical step-off. No other fractures are identified.  Moderate hypertrophic degenerative change is apparent at the  acromioclavicular joint. No dislocation.   Report Conclusions  IMPRESSION:     Proximal right humerus fracture.    This report was finalized on 2/7/2023 2:55 PM by Dr. Eddie Jones M.D.       ASSESSMENT:  Proximal humerus fracture, right    Patient Active Problem List   Diagnosis   • Closed fracture of proximal end of right humerus, unspecified fracture morphology, initial encounter   • HTN (hypertension)   • HLD (hyperlipidemia)   • CAD (coronary artery disease)   • GERD (gastroesophageal reflux disease)       PLAN:    Reviewed the nature of his fracture with the patient and did show him a picture of his x-ray.  We discussed that while he does have a fractured humerus, that the pattern of his fracture is most amenable to conservative management and will plan to avoid surgery for now.  That being said, we did discuss that it is very normal to continue to have some pain with these injuries for a period of at least 3 weeks.  He has been receiving Norco during his admission, most recently a few minutes before our discussion.  Would monitor for his response to this.      Given his report of muscle spasm type symptoms, he may benefit from a muscle relaxer.    Could also trial a regimen of tramadol with scheduled Tylenol      He will continue to utilize his sling.  He can come out of the sling for hygiene as well as regular elbow motion as tolerated.    We will initiate some gentle shoulder pendulums at his first follow-up appointment if alignment is maintained.    We will plan for outpatient follow-up in 2 to 3 weeks for repeat radiographs    No further orthopedic interventions are planned at present.  We will sign off for now.  Please call with any questions or concerns.  [phone]    The above diagnosis and treatment plan was discussed with the patient.  They were educated in treatment options for their condition.   They were given the opportunity to ask questions and were answered to their satisfaction.  They agreed to proceed with the above treatment plan.      In addition, the this assessment and plan of care were reviewed with my collaborative physician, Parveen Soriano MD.  He was in agreement with the plan as described        Thank you for this consultation and for the opportunity to participate in this patient's care  Tomer Sepulveda, APRN  Date: 2/8/2023

## 2023-02-08 NOTE — CASE MANAGEMENT/SOCIAL WORK
Continued Stay Note  [facility]     Patient Name: Cara Garvey  MRN: [id number]  Today's Date: 2/8/2023    Admit Date: 2/7/2023    Plan: SNF- pending PT eval   Discharge Plan     Row Name 02/08/23 1142       Plan    Plan SNF- pending PT eval    Patient/Family in Agreement with Plan yes    Plan Comments Spoke with patient's daughter Brittany via phone who confirmed patient admitted from [facility] and they would like patient to go to SNF before returning to [facility]. Referrals pending to [facility] and [facility] per family request. Requested PT eval through [facility]. Patient may need transportation at [place]. CCP to follow. Earl MARY RN               Discharge Codes    No documentation.               Expected Discharge Date and Time     Expected Discharge Date Expected Discharge Time    Feb 9, 2023             Earl Damon RN

## 2023-02-09 ENCOUNTER — APPOINTMENT (OUTPATIENT)
Dept: GENERAL RADIOLOGY | Facility: HOSPITAL | Age: 88
DRG: 522 | End: 2023-02-09
Payer: MEDICARE

## 2023-02-09 PROBLEM — S72.001A FRACTURE OF FEMORAL NECK, RIGHT: Status: ACTIVE | Noted: 2023-02-09

## 2023-02-09 LAB
ANION GAP SERPL CALCULATED.3IONS-SCNC: 14.1 MMOL/L (ref 5–15)
BUN SERPL-MCNC: 25 MG/DL (ref 8–23)
BUN/CREAT SERPL: 25.5 (ref 7–25)
CALCIUM SPEC-SCNC: 7.8 MG/DL (ref 8.6–10.5)
CHLORIDE SERPL-SCNC: 105 MMOL/L (ref 98–107)
CO2 SERPL-SCNC: 18.9 MMOL/L (ref 22–29)
CREAT SERPL-MCNC: 0.98 MG/DL (ref 0.76–1.27)
DEPRECATED RDW RBC AUTO: 44.6 FL (ref 37–54)
EGFRCR SERPLBLD CKD-EPI 2021: 74.2 ML/MIN/1.73
ERYTHROCYTE [DISTWIDTH] IN BLOOD BY AUTOMATED COUNT: 12.4 % (ref 12.3–15.4)
GLUCOSE SERPL-MCNC: 91 MG/DL (ref 65–99)
HBA1C MFR BLD: 5.6 % (ref 4.8–5.6)
HCT VFR BLD AUTO: 31.7 % (ref 37.5–51)
HGB BLD-MCNC: 10.1 G/DL (ref 13–17.7)
MCH RBC QN AUTO: 30.9 PG (ref 26.6–33)
MCHC RBC AUTO-ENTMCNC: 31.9 G/DL (ref 31.5–35.7)
MCV RBC AUTO: 96.9 FL (ref 79–97)
PLATELET # BLD AUTO: 116 10*3/MM3 (ref 140–450)
PMV BLD AUTO: 10.3 FL (ref 6–12)
POTASSIUM SERPL-SCNC: 4.5 MMOL/L (ref 3.5–5.2)
RBC # BLD AUTO: 3.27 10*6/MM3 (ref 4.14–5.8)
SODIUM SERPL-SCNC: 138 MMOL/L (ref 136–145)
WBC NRBC COR # BLD: 8.28 10*3/MM3 (ref 3.4–10.8)

## 2023-02-09 PROCEDURE — 73502 X-RAY EXAM HIP UNI 2-3 VIEWS: CPT

## 2023-02-09 PROCEDURE — 85027 COMPLETE CBC AUTOMATED: CPT | Performed by: STUDENT IN AN ORGANIZED HEALTH CARE EDUCATION/TRAINING PROGRAM

## 2023-02-09 PROCEDURE — 97530 THERAPEUTIC ACTIVITIES: CPT

## 2023-02-09 PROCEDURE — 80048 BASIC METABOLIC PNL TOTAL CA: CPT | Performed by: STUDENT IN AN ORGANIZED HEALTH CARE EDUCATION/TRAINING PROGRAM

## 2023-02-09 PROCEDURE — 83036 HEMOGLOBIN GLYCOSYLATED A1C: CPT | Performed by: STUDENT IN AN ORGANIZED HEALTH CARE EDUCATION/TRAINING PROGRAM

## 2023-02-09 PROCEDURE — 73070 X-RAY EXAM OF ELBOW: CPT

## 2023-02-09 PROCEDURE — 97162 PT EVAL MOD COMPLEX 30 MIN: CPT

## 2023-02-09 PROCEDURE — 25010000002 MORPHINE PER 10 MG: Performed by: NURSE PRACTITIONER

## 2023-02-09 RX ORDER — ENOXAPARIN SODIUM 100 MG/ML
40 INJECTION SUBCUTANEOUS EVERY 24 HOURS
Status: DISCONTINUED | OUTPATIENT
Start: 2023-02-09 | End: 2023-02-09

## 2023-02-09 RX ORDER — OXYCODONE HYDROCHLORIDE 5 MG/1
2.5 TABLET ORAL EVERY 6 HOURS PRN
Status: DISCONTINUED | OUTPATIENT
Start: 2023-02-09 | End: 2023-02-10

## 2023-02-09 RX ORDER — SODIUM CHLORIDE 9 MG/ML
75 INJECTION, SOLUTION INTRAVENOUS CONTINUOUS
Status: DISCONTINUED | OUTPATIENT
Start: 2023-02-09 | End: 2023-02-10

## 2023-02-09 RX ORDER — ACETAMINOPHEN 500 MG
1000 TABLET ORAL EVERY 8 HOURS
Status: DISCONTINUED | OUTPATIENT
Start: 2023-02-09 | End: 2023-02-10

## 2023-02-09 RX ADMIN — TAMSULOSIN HYDROCHLORIDE 0.4 MG: 0.4 CAPSULE ORAL at 20:51

## 2023-02-09 RX ADMIN — MORPHINE SULFATE 2 MG: 2 INJECTION, SOLUTION INTRAMUSCULAR; INTRAVENOUS at 09:11

## 2023-02-09 RX ADMIN — OXYCODONE 2.5 MG: 5 TABLET ORAL at 19:55

## 2023-02-09 RX ADMIN — LOSARTAN POTASSIUM 25 MG: 25 TABLET, FILM COATED ORAL at 09:12

## 2023-02-09 RX ADMIN — HYDROCODONE BITARTRATE AND ACETAMINOPHEN 1 TABLET: 5; 325 TABLET ORAL at 05:27

## 2023-02-09 RX ADMIN — MORPHINE SULFATE 2 MG: 2 INJECTION, SOLUTION INTRAMUSCULAR; INTRAVENOUS at 03:25

## 2023-02-09 RX ADMIN — MEMANTINE HYDROCHLORIDE 10 MG: 10 TABLET, FILM COATED ORAL at 09:13

## 2023-02-09 RX ADMIN — OXYCODONE 2.5 MG: 5 TABLET ORAL at 11:26

## 2023-02-09 RX ADMIN — DOCUSATE SODIUM 50MG AND SENNOSIDES 8.6MG 2 TABLET: 8.6; 5 TABLET, FILM COATED ORAL at 20:51

## 2023-02-09 RX ADMIN — ACETAMINOPHEN 1000 MG: 500 TABLET, FILM COATED ORAL at 11:26

## 2023-02-09 RX ADMIN — DOCUSATE SODIUM 50MG AND SENNOSIDES 8.6MG 2 TABLET: 8.6; 5 TABLET, FILM COATED ORAL at 09:12

## 2023-02-09 RX ADMIN — SERTRALINE 50 MG: 50 TABLET, FILM COATED ORAL at 09:12

## 2023-02-09 RX ADMIN — MEMANTINE HYDROCHLORIDE 10 MG: 10 TABLET, FILM COATED ORAL at 20:51

## 2023-02-09 RX ADMIN — ACETAMINOPHEN 1000 MG: 500 TABLET, FILM COATED ORAL at 19:54

## 2023-02-09 RX ADMIN — ATORVASTATIN CALCIUM 20 MG: 20 TABLET, FILM COATED ORAL at 09:12

## 2023-02-09 RX ADMIN — FINASTERIDE 5 MG: 5 TABLET, FILM COATED ORAL at 09:13

## 2023-02-09 RX ADMIN — SODIUM CHLORIDE 75 ML/HR: 9 INJECTION, SOLUTION INTRAVENOUS at 17:52

## 2023-02-09 RX ADMIN — PANTOPRAZOLE SODIUM 40 MG: 40 TABLET, DELAYED RELEASE ORAL at 05:27

## 2023-02-09 RX ADMIN — MORPHINE SULFATE 2 MG: 2 INJECTION, SOLUTION INTRAMUSCULAR; INTRAVENOUS at 14:51

## 2023-02-09 RX ADMIN — CARVEDILOL 12.5 MG: 12.5 TABLET, FILM COATED ORAL at 09:12

## 2023-02-09 RX ADMIN — Medication 1000 UNITS: at 09:12

## 2023-02-09 RX ADMIN — LEVOTHYROXINE SODIUM 25 MCG: 0.03 TABLET ORAL at 05:59

## 2023-02-09 RX ADMIN — Medication 6 MG: at 20:51

## 2023-02-09 NOTE — DISCHARGE PLACEMENT REQUEST
"Juancarlos Woo (88 y.o. Male)     Date of Birth   1934    Social Security Number       Address   [address] [place] [address]    Home Phone   [phone]    MRN   [id number]       Yazdanism   Judaism    Marital Status   Single                            Admission Date   2/7/23    Admission Type   Emergency    Admitting Provider   Levar Murillo MD    Attending Provider   Levar Murillo MD    Department, Room/Bed   [address], P384/1       Discharge Date       Discharge Disposition       Discharge Destination                               Attending Provider: Levar Murillo MD    Allergies: Lisinopril    Isolation: None   Infection: None   Code Status: CPR    Ht: 170.2 cm (67\")   Wt: 79.6 kg (175 lb 7.8 oz)    Admission Cmt: None   Principal Problem: Closed fracture of proximal end of right humerus, unspecified fracture morphology, initial encounter [S42.201A]                 Active Insurance as of 2/7/2023     Primary Coverage     Payor Plan Insurance Group Employer/Plan Group    MEDICARE MEDICARE A & B      Payor Plan Address Payor Plan Phone Number Payor Plan Fax Number Effective Dates    [address] [phone]  10/1/1999 - None Entered    [facility] [address]       Subscriber Name Subscriber Birth Date Member ID       JUANCARLOS WOO 1934 [id number]           Secondary Coverage     Payor Plan Insurance Group Employer/Plan Group    AARP [employer] SUP AARP HEALTH CARE OPTIONS      Payor Plan Address Payor Plan Phone Number Payor Plan Fax Number Effective Dates    [facility] [phone]  1/1/2023 - None Entered    [address]       [facility] [address]       Subscriber Name Subscriber Birth Date Member ID       JUANCARLOS WOO 1934 [id number]                 Emergency Contacts      (Rel.) Home Phone Work Phone Mobile Phone    JJ KAPLAN (Daughter) -- -- [phone]    CurtisBrittany (Daughter) [phone] -- --    "

## 2023-02-09 NOTE — THERAPY EVALUATION
Patient Name: Cara Garvey  : 1934    MRN: [id number]                              Today's Date: 2023       Admit Date: 2023    Visit Dx:     ICD-10-CM ICD-9-CM   1. Closed fracture of proximal end of right humerus, unspecified fracture morphology, initial encounter  S42.201A 812.00   2. History of dementia  Z86.59 V11.8   3. Compression fracture of thoracic vertebra, unspecified thoracic vertebral level, initial encounter ([facility])  S22.000A 805.2     Patient Active Problem List   Diagnosis   • Closed fracture of proximal end of right humerus, unspecified fracture morphology, initial encounter   • HTN (hypertension)   • HLD (hyperlipidemia)   • CAD (coronary artery disease)   • GERD (gastroesophageal reflux disease)     History reviewed. No pertinent past medical history.  History reviewed. No pertinent surgical history.   General Information     Row Name 23 1048          Physical Therapy Time and Intention    Document Type evaluation (P)   -MB     Mode of Treatment physical therapy (P)   -MB     Row Name 23 1048          General Information    Patient Profile Reviewed yes (P)   -MB     Prior Level of Function --  (P)   [facility] memory care, receives assist from staff  -MB     Existing Precautions/Restrictions fall;cervical collar  (P)   R UE sling, can be removed for gentle elbow ROM  -MB     Barriers to Rehab cognitive status (P)   -MB     Row Name 23 1048          Living Environment    People in Home facility resident (P)   -MB     Row Name 23 1048          Cognition    Orientation Status (Cognition) disoriented to;person;place;situation;time;verbal cues/prompts needed for orientation (P)   -MB     Row Name 23 1048          Safety Issues, Functional Mobility    Safety Issues Affecting Function (Mobility) awareness of need for assistance;judgment;insight into deficits/self-awareness;safety precaution awareness;safety precautions follow-through/compliance;sequencing abilities  (P)   -MB     Impairments Affecting Function (Mobility) balance;cognition;endurance/activity tolerance;strength;pain;postural/trunk control (P)   -MB     Cognitive Impairments, Mobility Safety/Performance insight into deficits/self-awareness;safety precaution awareness;safety precaution follow-through;judgment;sequencing abilities (P)   -MB           User Key  (r) = Recorded By, (t) = Taken By, (c) = Cosigned By    Initials Name Provider Type    Aamir Hackett, PT Student PT Student               Mobility     Row Name 02/09/23 1050          Bed Mobility    Bed Mobility other (see comments) (P)   Long sit maxAx2  -MB     Assistive Device (Bed Mobility) bed rails;head of bed elevated (P)   -MB     Comment, (Bed Mobility) attempt to sit EOB on this date, pt in significant pain, resistant to move cody LE towards EOB, grimacing and yelling out in pain with all attempts; maxAx2 long sit - unable to tolerate > 10 seconds (P)   -MB     Row Name 02/09/23 1050          Transfers    Comment, (Transfers) NT - unable to sit EOB on this date d/t pain (P)   -MB     Row Name 02/09/23 1050          Mobility    Extremity Weight-bearing Status right upper extremity (P)   -MB     Right Upper Extremity (Weight-bearing Status) other (see comments) (P)   R UE sling, remove sling for elbow ROM as tolerated, gentle hygeine  -MB           User Key  (r) = Recorded By, (t) = Taken By, (c) = Cosigned By    Initials Name Provider Type    Aamir Hackett, PT Student PT Student               Obj/Interventions     Row Name 02/09/23 1055          Balance    Balance Assessment sitting static balance (P)   -MB     Static Sitting Balance maximum assist;2-person assist (P)   -MB     Position, Sitting Balance supported;long sitting;other (see comments) (P)   maxAx2 with draw sheet  -MB     Balance Interventions sitting;highly challenging (P)   -MB           User Key  (r) = Recorded By, (t) = Taken By, (c) = Cosigned By    Initials Name Provider Type     Aamir Hackett, PT Student PT Student               Goals/Plan     Row Name 02/09/23 1112          Bed Mobility Goal 1 (PT)    Activity/Assistive Device (Bed Mobility Goal 1, PT) bed mobility activities, all (P)   -MB     [place] Level/Cues Needed (Bed Mobility Goal 1, PT) moderate assist (50-74% patient effort) (P)   -MB     Time Frame (Bed Mobility Goal 1, PT) short term goal (STG);1 week (P)   -MB     Row Name 02/09/23 1112          Transfer Goal 1 (PT)    Activity/Assistive Device (Transfer Goal 1, PT) transfers, all (P)   -MB     [place] Level/Cues Needed (Transfer Goal 1, PT) moderate assist (50-74% patient effort);maximum assist (25-49% patient effort) (P)   -MB     Time Frame (Transfer Goal 1, PT) short term goal (STG);1 week (P)   -MB           User Key  (r) = Recorded By, (t) = Taken By, (c) = Cosigned By    Initials Name Provider Type    Aamir Hackett, PT Student PT Student               Clinical Impression     Row Name 02/09/23 1056          Pain    Pain Location - Side/Orientation Right (P)   -MB     Pain Location - hip;shoulder (P)   -MB     Pre/Posttreatment Pain Comment pt grimaces and yells out in pain with all attempts to mobilize today, unable to give numerical value; states significant pain in R hip (P)   -MB     Pain Intervention(s) Rest;Elevated;Repositioned (P)   -MB     Row Name 02/09/23 1056          Plan of Care Review    Plan of Care Reviewed With patient (P)   -MB     Outcome Evaluation Pt admitted for recent fall resulting in closed fx R humerus. Hx chronic cervical fx and wearing a soft C collar. PLOF B[facility]DA memory care, hx dementia. Disoriented and confused. Per ortho, pt not a candidate for surgery at this time, R UE being treated with conservative measures, placed in a sling. Pt allowed to take off sling to perform elbow ROM as tolerated. Pt demos significant difficulty performing all mobility tasks today. C/o of increased pain with attempts to sit EOB, specifically in  his R hip, limiting participation with PT. Long sit maxAx2 approx. 10 seconds, unable to tolerate further d/t pain. Pt resistant to repositioning for comfort, grimaces and yells out in pain when adding supportive pillows under R UE. Nsg notified of new pain reported in R hip as he may benefit from further imaging. PT recommending SNF for further rehab at this time. Pt would benefit from skilled PT to address stated deficits. (P)   -MB     Row Name 02/09/23 1056          Therapy Assessment/Plan (PT)    Rehab Potential (PT) fair, will monitor progress closely (P)   -MB     Criteria for Skilled Interventions Met (PT) yes (P)   -MB     Therapy Frequency (PT) 5 times/wk (P)   -MB     Row Name 02/09/23 1056          Positioning and Restraints    Pre-Treatment Position in bed (P)   -MB     Post Treatment Position bed (P)   -MB     In Bed fowlers;call light within reach;encouraged to call for assist;notified nsg;exit alarm on;side rails up x3 (P)   -MB           User Key  (r) = Recorded By, (t) = Taken By, (c) = Cosigned By    Initials Name Provider Type    Aamir Hackett, PT Student PT Student               Outcome Measures     Row Name 02/09/23 1113          How much help from another person do you currently need...    Turning from your back to your side while in flat bed without using bedrails? 1 (P)   -MB     Moving from lying on back to sitting on the side of a flat bed without bedrails? 1 (P)   -MB     Moving to and from a bed to a chair (including a wheelchair)? 1 (P)   -MB     Standing up from a chair using your arms (e.g., wheelchair, bedside chair)? 1 (P)   -MB     Climbing 3-5 steps with a railing? 1 (P)   -MB     To walk in hospital room? 1 (P)   -MB     AM-PAC 6 Clicks Score (PT) 6 (P)   -MB     Highest level of mobility 2 --> Bed activities/dependent transfer (P)   -MB     Row Name 02/09/23 1113          Functional Assessment    Outcome Measure Options AM-PAC 6 Clicks Basic Mobility (PT) (P)   -MB            User Key  (r) = Recorded By, (t) = Taken By, (c) = Cosigned By    Initials Name Provider Type    Aamir Hackett, PT Student PT Student                             Physical Therapy Education     Title: PT OT SLP Therapies (In Progress)     Topic: Physical Therapy (In Progress)     Point: Mobility training (Done)     Learning Progress Summary           Patient Acceptance, E,TB, VU by MB at 2/9/2023 1113                   Point: Home exercise program (Not Started)     Learner Progress:  Not documented in this visit.          Point: Body mechanics (Not Started)     Learner Progress:  Not documented in this visit.          Point: Precautions (Not Started)     Learner Progress:  Not documented in this visit.                      User Key     Initials Effective Dates Name Provider Type Discipline    MB 12/30/22 -  Aamir Gresham, PT Student PT Student PT              PT Recommendation and Plan     Plan of Care Reviewed With: (P) patient  Outcome Evaluation: (P) Pt admitted for recent fall resulting in closed fx R humerus. Hx chronic cervical fx and wearing a soft C collar. PLOF retirement memory care, hx dementia. Disoriented and confused. Per ortho, pt not a candidate for surgery at this time, R UE being treated with conservative measures, placed in a sling. Pt allowed to take off sling to perform elbow ROM as tolerated. Pt demos significant difficulty performing all mobility tasks today. C/o of increased pain with attempts to sit EOB, specifically in his R hip, limiting participation with PT. Long sit maxAx2 approx. 10 seconds, unable to tolerate further d/t pain. Pt resistant to repositioning for comfort, grimaces and yells out in pain when adding supportive pillows under R UE. Nsg notified of new pain reported in R hip as he may benefit from further imaging. PT recommending SNF for further rehab at this time. Pt would benefit from skilled PT to address stated deficits.     Time Calculation:    PT Charges     Row Name  02/09/23 1047             Time Calculation    Start Time 1006 (P)   -MB      Stop Time 1018 (P)   -MB      Time Calculation (min) 12 min (P)   -MB      PT Received On 02/09/23 (P)   -MB      PT - Next Appointment 02/10/23 (P)   -MB      PT Goal Re-Cert Due Date 02/16/23 (P)   -MB         Time Calculation- PT    Total Timed Code Minutes- PT 12 minute(s) (P)   -MB            User Key  (r) = Recorded By, (t) = Taken By, (c) = Cosigned By    Initials Name Provider Type    Aamir Hackett, PT Student PT Student              Therapy Charges for Today     Code Description Service Date Service Provider Modifiers Qty    35139457422 HC PT EVAL MOD COMPLEXITY 4 2/9/2023 Aamir Gresham, PT Student GP 1    81247727801 HC PT THERAPEUTIC ACT EA 15 MIN 2/9/2023 Aamir Gresham, PT Student GP 1    53372292539 HC PT THER SUPP EA 15 MIN 2/9/2023 Aamir Gresham, PT Student GP 1          PT G-Codes  Outcome Measure Options: (P) AM-PAC 6 Clicks Basic Mobility (PT)  AM-PAC 6 Clicks Score (PT): (P) 6  PT Discharge Summary  Anticipated Discharge Disposition (PT): (P) skilled nursing facility    Aamir Gresham PT Student  2/9/2023

## 2023-02-09 NOTE — PLAN OF CARE
Goal Outcome Evaluation:               Patient resting in bed.  Complaints of pain this shift; see MAR for all medications administered.  Assist x 1-2 with gait belt.  Disoriented to time and situation.  Vitals stable, generally on room air, but was put on 2L O2 via NC due to O2 sats dropping to the high to mid 90's while sleeping.  Safety precautions in use, bed alarm activated.  Nursing will continue to monitor.

## 2023-02-09 NOTE — PROGRESS NOTES
Name: Cara Garvey ADMIT: 2023   : 1934  PCP: Princess Yi MD    MRN: [id number] LOS: 0 days   AGE/SEX: 88 y.o. male  ROOM: [facility]     Subjective   Subjective   Patient seen this morning.  States right arm pain stable, however now complaints of elbow pain and hip pain.  No fevers, chills nausea or vomiting.    Review of Systems   as above  Objective   Objective   Vital Signs  Temp:  [97.2 °F (36.2 °C)-99.3 °F (37.4 °C)] 97.3 °F (36.3 °C)  Heart Rate:  [57-71] 65  Resp:  [17-18] 17  BP: (109-131)/(45-60) 111/45  SpO2:  [90 %-94 %] 92 %  on  Flow (L/min):  [2] 2;   Device (Oxygen Therapy): room air  Body mass index is 27.49 kg/m².  Physical Exam    General: Alert, laying in bed, not in distress,  HEENT: Normocephalic, atraumatic  CV: RRR, no murmurs  Lungs: Clear to auscultation bilaterally, no crackles or wheezes  Abdomen: Soft, nontender, nondistended  Extremities: Right upper extremity in sling, able to wiggle his fingers.  Right hip tender to palpation, decreased range of motion of right lower extremity due to pain.    Results Review     I reviewed the patient's new clinical results.  Results from last 7 days   Lab Units 23  0652 23  1123 23  1516   WBC 10*3/mm3 8.28 8.57 10.55   HEMOGLOBIN g/dL 10.1* 9.9* 11.5*   PLATELETS 10*3/mm3 116* 148 179     Results from last 7 days   Lab Units 23  0652 23  1516   SODIUM mmol/L 138 137   POTASSIUM mmol/L 4.5 3.8   CHLORIDE mmol/L 105 104   CO2 mmol/L 18.9* 27.0   BUN mg/dL 25* 20   CREATININE mg/dL 0.98 1.02   GLUCOSE mg/dL 91 137*   Estimated Creatinine Clearance: 52.7 mL/min (by C-G formula based on SCr of 0.98 mg/dL).    Results from last 7 days   Lab Units 23  0652 23  1516   CALCIUM mg/dL 7.8* 8.5*       COVID19   Date Value Ref Range Status   2023 Not Detected Not Detected - Ref. Range Final     Hemoglobin A1C   Date/Time Value Ref Range Status   2023 0652 5.60 4.80 - 5.60 % Final        XR Elbow 2 View Right  Narrative: XR ELBOW 2 VW RIGHT-     INDICATIONS: Trauma     TECHNIQUE: 2 views the right elbow     COMPARISON: None available     FINDINGS:     Assessment is significantly limited by difficulty with patient  positioning. No obvious displaced fracture is identified, but if there  is clinical suspicion for fracture, additional views and/or  cross-sectional imaging would be recommended. Owing to patient  positioning, presence or absence of elbow effusion cannot be accurately  characterized.     Impression:    As described.     This report was finalized on 2/9/2023 1:30 PM by Dr. Eddie Jones M.D.     XR Hip With or Without Pelvis 2 - 3 View Right  Narrative: XR HIP W OR WO PELVIS 2-3 VIEW RIGHT-     INDICATIONS: Trauma     TECHNIQUE: Frontal view the pelvis, 2 views of the right hip     COMPARISON: None available     FINDINGS:     Fracture of the right femoral neck is noted with proximal retraction  distal fracture fragment. No other fractures are identified. No  dislocation. Hip joint spaces appear preserved. Degenerative changes are  seen in the partly included lumbar spine. Arterial calcifications are  present.     Impression:    Right femoral neck fracture.     This report was finalized on 2/9/2023 1:29 PM by Dr. Eddie Jones M.D.       Scheduled Medications  acetaminophen, 1,000 mg, Oral, Q8H  atorvastatin, 20 mg, Oral, Daily  carvedilol, 12.5 mg, Oral, BID With Meals  cholecalciferol, 1,000 Units, Oral, Daily  enoxaparin, 40 mg, Subcutaneous, Q24H  finasteride, 5 mg, Oral, Daily  levothyroxine, 25 mcg, Oral, Q AM  losartan, 25 mg, Oral, Daily  melatonin, 6 mg, Oral, Nightly  memantine, 10 mg, Oral, Q12H  pantoprazole, 40 mg, Oral, Q AM  senna-docusate sodium, 2 tablet, Oral, BID  sertraline, 50 mg, Oral, Daily  tamsulosin, 0.4 mg, Oral, Nightly    Infusions   Diet  Diet: Regular/House Diet; Texture: Soft to Chew (NDD 3); Soft to Chew: Chopped Meat; Fluid Consistency:  Thin (IDDSI 0)       Assessment/Plan     Active Hospital Problems    Diagnosis  POA   • **Closed fracture of proximal end of right humerus, unspecified fracture morphology, initial encounter [S42.201A]  Yes   • Fracture of femoral neck, right (HCC) [S72.001A]  Unknown   • CAD (coronary artery disease) [I25.10]  Yes   • GERD (gastroesophageal reflux disease) [K21.9]  Yes   • HTN (hypertension) [I10]  Yes   • HLD (hyperlipidemia) [E78.5]  Yes      Resolved Hospital Problems   No resolved problems to display.       Mr. Garvey is a 88 y.o. non-smoker with a history of CAD s/p stent to LAD, HTN, HLD, and GERD that presents to [facility] secondary to shoulder pain after unwitnessed fall.     Right shoulder pain.  X-ray showed proximal right humerus fracture.  Continue arm sling.  Orthopedic evaluated, recommend conservative management, no surgery required.  Continue utilize sling, sling, to sling for hygiene as well as regular elbow motion as tolerated.  Follow-up with orthopedic surgery in 2-3 weeks.  Was recommended.      Right femoral neck fracture.  Patient complaied of acute fracture, x-ray showed right femoral neck fracture.  Orthopedic surgery consulted.      Hypocalcemia.  Calcium 7.8 this morning.  However no albumin available for correction.  Will order repeat BMP , albumin, vitamin D, ionized calcium.    Dementia: Patient oriented to name, place.  Resume home Namenda.  Patient resides in memory care unit.     Essential hypertension: BP stable on home regimen.  Continue home carvedilol, losartan.     Hypothyroidism: Continue levothyroxine.        Anemia: Iron studies consistent anemia of chronic disease.  B12 and folate normal.  Hemoglobin stable.  Monitor.      CODE STATUS: Full code  Disposition: SNF, not medically stable to be discharged.  DVT prophylax: Lovenox  Discussed with RN  Discussed with daughter      I wore protective equipment throughout this patient encounter including a face mask,  gloves and protective eyewear.  Hand hygiene was performed before donning protective equipment and after removal when leaving the room.      Dictated utilizing Dragon dictation        Levar Murillo MD  [facility]  02/09/23  16:58 EST

## 2023-02-09 NOTE — CASE MANAGEMENT/SOCIAL WORK
Continued Stay Note  [facility]     Patient Name: Cara Garvey  MRN: [id number]  Today's Date: 2/9/2023    Admit Date: 2/7/2023    Plan: SNF- pending choices of SNF from family.   Discharge Plan     Row Name 02/09/23 1617       Plan    Plan SNF- pending choices of SNF from family.    Patient/Family in Agreement with Plan yes    Plan Comments Spoke with Joanne/Ester who stated she does not have a bed for patient at [place] or [place]. Called patient's daughter-Brittany to update, Left VM and awaiting call back to obtain more SNF choices. Spoke with patient's daughter-Concha to discuss more SNF choices. Concha stated they would like to review with her sister Brittany and follow up with CCP in the am.  CCP to follow. Bessie MARY RN    Row Name 02/09/23 3611       Plan    Plan SNF- referrals pending    Patient/Family in Agreement with Plan yes    Plan Comments Spoke with Joanne/Ester to notify that PT jordy was comleted and requested she review for admissoin for SNF at [facility] and [place]. CCP to follow. Bessie MARY RN               Discharge Codes    No documentation.               Expected Discharge Date and Time     Expected Discharge Date Expected Discharge Time    Feb 11, 2023             Bessie Damon RN

## 2023-02-09 NOTE — PLAN OF CARE
Goal Outcome Evaluation:           Progress: no change  Outcome Evaluation: Several c/o pain this shift. Morphine given x2 for uncontrolled pain. Right arm sling in place. soft C-collar in place. IV L AC flushing well. Speech eval placed for coughing after taking morning pills with water. Pt now on a soft/chopped, thin liquid diet. Pills now taken with apple sauce, tolerating well. Up to the chair per pt request. Assistance x2 with a gaitbelt was required for the pt to stand and pivot to the chair.

## 2023-02-09 NOTE — CASE MANAGEMENT/SOCIAL WORK
Continued Stay Note  [facility]     Patient Name: Cara Garvey  MRN: [id number]  Today's Date: 2/9/2023    Admit Date: 2/7/2023    Plan: SNF- referrals pending   Discharge Plan     Row Name 02/09/23 1417       Plan    Plan SNF- referrals pending    Patient/Family in Agreement with Plan yes    Plan Comments Spoke with Joanne/Ester to notify that PT jordy was comleted and requested she review for admissoin for SNF at [facility] and [place]. CCP to follow. Bessie MARY RN               Discharge Codes    No documentation.               Expected Discharge Date and Time     Expected Discharge Date Expected Discharge Time    Feb 11, 2023             Bessie Damon RN

## 2023-02-09 NOTE — PLAN OF CARE
Goal Outcome Evaluation:  Plan of Care Reviewed With: (P) patient           Outcome Evaluation: (P) Pt admitted for recent fall resulting in closed fx R humerus. Hx chronic cervical fx and wearing a soft C collar. PLOF BRIANDA memory care, hx dementia. Disoriented and confused. Per ortho, pt not a candidate for surgery at this time, R UE being treated with conservative measures, placed in a sling. Pt allowed to take off sling to perform elbow ROM as tolerated. Pt demos significant difficulty performing all mobility tasks today. C/o of increased pain with attempts to sit EOB, specifically in his R hip, limiting participation with PT. Long sit maxAx2 approx. 10 seconds, unable to tolerate further d/t pain. Pt resistant to repositioning for comfort, grimaces and yells out in pain when adding supportive pillows under R UE. Nsg notified of new pain reported in R hip as he may benefit from further imaging. PT recommending SNF for further rehab at this time. Pt would benefit from skilled PT to address stated deficits.

## 2023-02-09 NOTE — PROGRESS NOTES
Consult received for right hip pain. Patient with Right femoral neck fracture. Discussed with nursing staff. Will hold Lovenox. NPO after midnight for possible surgery tomorrow dependent on OR and surgeon availability. We will see the patient in the AM.     Nicholas Garcia PA-C    Date: 2/9/2023  Time: 17:07 EST

## 2023-02-09 NOTE — PLAN OF CARE
Goal Outcome Evaluation:           Progress: no change  Outcome Evaluation: Frequent c/o pain this shift. Roxicodone given per order. Morphine given x2 this shift. c/o pain to his right hip and elbow. X ray of the right hip and right elbow completed today, see Xray results. Orthopedic surgery consulted. pt NPO at [place]. Hold Lovenox per Dr. De, telephone with readback. VSS. disoriented to time, place, and situation. Short term forgetfulness.

## 2023-02-10 ENCOUNTER — ANESTHESIA EVENT (OUTPATIENT)
Dept: PERIOP | Facility: HOSPITAL | Age: 88
DRG: 522 | End: 2023-02-10
Payer: MEDICARE

## 2023-02-10 ENCOUNTER — APPOINTMENT (OUTPATIENT)
Dept: GENERAL RADIOLOGY | Facility: HOSPITAL | Age: 88
DRG: 522 | End: 2023-02-10
Payer: MEDICARE

## 2023-02-10 ENCOUNTER — ANESTHESIA (OUTPATIENT)
Dept: PERIOP | Facility: HOSPITAL | Age: 88
DRG: 522 | End: 2023-02-10
Payer: MEDICARE

## 2023-02-10 ENCOUNTER — APPOINTMENT (OUTPATIENT)
Dept: CT IMAGING | Facility: HOSPITAL | Age: 88
DRG: 522 | End: 2023-02-10
Payer: MEDICARE

## 2023-02-10 PROBLEM — E55.9 VITAMIN D DEFICIENCY: Chronic | Status: ACTIVE | Noted: 2023-02-10

## 2023-02-10 PROBLEM — F03.B0 MODERATE DEMENTIA WITHOUT BEHAVIORAL DISTURBANCE: Chronic | Status: ACTIVE | Noted: 2023-02-10

## 2023-02-10 LAB
25(OH)D3 SERPL-MCNC: 21.4 NG/ML (ref 30–100)
ALBUMIN SERPL-MCNC: 3 G/DL (ref 3.5–5.2)
ANION GAP SERPL CALCULATED.3IONS-SCNC: 13.9 MMOL/L (ref 5–15)
BUN SERPL-MCNC: 24 MG/DL (ref 8–23)
BUN/CREAT SERPL: 25.3 (ref 7–25)
CA-I BLD-MCNC: 4.6 MG/DL (ref 4.6–5.4)
CA-I SERPL ISE-MCNC: 1.16 MMOL/L (ref 1.15–1.35)
CALCIUM SPEC-SCNC: 7.8 MG/DL (ref 8.6–10.5)
CHLORIDE SERPL-SCNC: 104 MMOL/L (ref 98–107)
CO2 SERPL-SCNC: 21.1 MMOL/L (ref 22–29)
CREAT SERPL-MCNC: 0.95 MG/DL (ref 0.76–1.27)
DEPRECATED RDW RBC AUTO: 42 FL (ref 37–54)
EGFRCR SERPLBLD CKD-EPI 2021: 77 ML/MIN/1.73
ERYTHROCYTE [DISTWIDTH] IN BLOOD BY AUTOMATED COUNT: 12.7 % (ref 12.3–15.4)
GLUCOSE SERPL-MCNC: 101 MG/DL (ref 65–99)
HCT VFR BLD AUTO: 31.1 % (ref 37.5–51)
HGB BLD-MCNC: 10.5 G/DL (ref 13–17.7)
MCH RBC QN AUTO: 30.9 PG (ref 26.6–33)
MCHC RBC AUTO-ENTMCNC: 33.8 G/DL (ref 31.5–35.7)
MCV RBC AUTO: 91.5 FL (ref 79–97)
PLATELET # BLD AUTO: 124 10*3/MM3 (ref 140–450)
PMV BLD AUTO: 10 FL (ref 6–12)
POTASSIUM SERPL-SCNC: 3.7 MMOL/L (ref 3.5–5.2)
RBC # BLD AUTO: 3.4 10*6/MM3 (ref 4.14–5.8)
SODIUM SERPL-SCNC: 139 MMOL/L (ref 136–145)
WBC NRBC COR # BLD: 8.05 10*3/MM3 (ref 3.4–10.8)

## 2023-02-10 PROCEDURE — 0SRR0J9 REPLACEMENT OF RIGHT HIP JOINT, FEMORAL SURFACE WITH SYNTHETIC SUBSTITUTE, CEMENTED, OPEN APPROACH: ICD-10-PCS | Performed by: ORTHOPAEDIC SURGERY

## 2023-02-10 PROCEDURE — 80048 BASIC METABOLIC PNL TOTAL CA: CPT | Performed by: STUDENT IN AN ORGANIZED HEALTH CARE EDUCATION/TRAINING PROGRAM

## 2023-02-10 PROCEDURE — 25010000002 FENTANYL CITRATE (PF) 50 MCG/ML SOLUTION: Performed by: NURSE ANESTHETIST, CERTIFIED REGISTERED

## 2023-02-10 PROCEDURE — 25010000002 ROPIVACAINE PER 1 MG: Performed by: ORTHOPAEDIC SURGERY

## 2023-02-10 PROCEDURE — 25010000002 FENTANYL CITRATE (PF) 50 MCG/ML SOLUTION: Performed by: ANESTHESIOLOGY

## 2023-02-10 PROCEDURE — 73501 X-RAY EXAM HIP UNI 1 VIEW: CPT

## 2023-02-10 PROCEDURE — 85027 COMPLETE CBC AUTOMATED: CPT | Performed by: STUDENT IN AN ORGANIZED HEALTH CARE EDUCATION/TRAINING PROGRAM

## 2023-02-10 PROCEDURE — 25010000002 DEXAMETHASONE SODIUM PHOSPHATE 20 MG/5ML SOLUTION: Performed by: NURSE ANESTHETIST, CERTIFIED REGISTERED

## 2023-02-10 PROCEDURE — 25010000002 MORPHINE PER 10 MG: Performed by: NURSE PRACTITIONER

## 2023-02-10 PROCEDURE — 25010000002 PROPOFOL 10 MG/ML EMULSION: Performed by: NURSE ANESTHETIST, CERTIFIED REGISTERED

## 2023-02-10 PROCEDURE — 0LQJ0ZZ REPAIR RIGHT HIP TENDON, OPEN APPROACH: ICD-10-PCS | Performed by: ORTHOPAEDIC SURGERY

## 2023-02-10 PROCEDURE — 82040 ASSAY OF SERUM ALBUMIN: CPT | Performed by: STUDENT IN AN ORGANIZED HEALTH CARE EDUCATION/TRAINING PROGRAM

## 2023-02-10 PROCEDURE — 82330 ASSAY OF CALCIUM: CPT | Performed by: STUDENT IN AN ORGANIZED HEALTH CARE EDUCATION/TRAINING PROGRAM

## 2023-02-10 PROCEDURE — 25010000002 EPINEPHRINE 1 MG/ML SOLUTION 30 ML VIAL: Performed by: ORTHOPAEDIC SURGERY

## 2023-02-10 PROCEDURE — C1713 ANCHOR/SCREW BN/BN,TIS/BN: HCPCS | Performed by: ORTHOPAEDIC SURGERY

## 2023-02-10 PROCEDURE — 25010000002 CEFAZOLIN IN DEXTROSE 2-4 GM/100ML-% SOLUTION: Performed by: ORTHOPAEDIC SURGERY

## 2023-02-10 PROCEDURE — C1776 JOINT DEVICE (IMPLANTABLE): HCPCS | Performed by: ORTHOPAEDIC SURGERY

## 2023-02-10 PROCEDURE — 25010000002 KETOROLAC TROMETHAMINE PER 15 MG: Performed by: ORTHOPAEDIC SURGERY

## 2023-02-10 PROCEDURE — 25010000002 MORPHINE PER 10 MG: Performed by: ORTHOPAEDIC SURGERY

## 2023-02-10 PROCEDURE — 82306 VITAMIN D 25 HYDROXY: CPT | Performed by: STUDENT IN AN ORGANIZED HEALTH CARE EDUCATION/TRAINING PROGRAM

## 2023-02-10 DEVICE — LINER VERSYS ASMBL BIPOL 50/51/52MM OD X28MM: Type: IMPLANTABLE DEVICE | Site: HIP | Status: FUNCTIONAL

## 2023-02-10 DEVICE — DEV CONTRL TISS STRATAFIX SYMM PDS PLUS VIL CT-1 60CM: Type: IMPLANTABLE DEVICE | Site: HIP | Status: FUNCTIONAL

## 2023-02-10 DEVICE — HD FEM/HIP UNIV COCR 12/14TPR 28MM MIN3.5: Type: IMPLANTABLE DEVICE | Site: HIP | Status: FUNCTIONAL

## 2023-02-10 DEVICE — IMPLANTABLE DEVICE
Type: IMPLANTABLE DEVICE | Site: HIP | Status: FUNCTIONAL
Brand: AVENIR®

## 2023-02-10 DEVICE — CMT BONE PALACOS R HI/VISC 1X40: Type: IMPLANTABLE DEVICE | Site: HIP | Status: FUNCTIONAL

## 2023-02-10 DEVICE — SHLL VERSYS M/BIPOL MTL OD 50MM: Type: IMPLANTABLE DEVICE | Site: HIP | Status: FUNCTIONAL

## 2023-02-10 DEVICE — DEV CONTRL TISS STRATAFIX SPIRAL MNCRYL PLS PS2 3/0 30CM UD: Type: IMPLANTABLE DEVICE | Site: HIP | Status: FUNCTIONAL

## 2023-02-10 DEVICE — CAP PRT HIP BIPOL: Type: IMPLANTABLE DEVICE | Status: FUNCTIONAL

## 2023-02-10 RX ORDER — FAMOTIDINE 10 MG/ML
20 INJECTION, SOLUTION INTRAVENOUS ONCE
Status: COMPLETED | OUTPATIENT
Start: 2023-02-10 | End: 2023-02-10

## 2023-02-10 RX ORDER — FENTANYL CITRATE 50 UG/ML
25 INJECTION, SOLUTION INTRAMUSCULAR; INTRAVENOUS
Status: DISCONTINUED | OUTPATIENT
Start: 2023-02-10 | End: 2023-02-10 | Stop reason: HOSPADM

## 2023-02-10 RX ORDER — SODIUM CHLORIDE 0.9 % (FLUSH) 0.9 %
10 SYRINGE (ML) INJECTION AS NEEDED
Status: DISCONTINUED | OUTPATIENT
Start: 2023-02-10 | End: 2023-02-16 | Stop reason: HOSPADM

## 2023-02-10 RX ORDER — TRANEXAMIC ACID 100 MG/ML
INJECTION, SOLUTION INTRAVENOUS AS NEEDED
Status: DISCONTINUED | OUTPATIENT
Start: 2023-02-10 | End: 2023-02-10 | Stop reason: SURG

## 2023-02-10 RX ORDER — SODIUM CHLORIDE 0.9 % (FLUSH) 0.9 %
10 SYRINGE (ML) INJECTION EVERY 12 HOURS SCHEDULED
Status: DISCONTINUED | OUTPATIENT
Start: 2023-02-10 | End: 2023-02-16 | Stop reason: HOSPADM

## 2023-02-10 RX ORDER — OLANZAPINE 10 MG/1
5 INJECTION, POWDER, LYOPHILIZED, FOR SOLUTION INTRAMUSCULAR ONCE
Status: DISCONTINUED | OUTPATIENT
Start: 2023-02-10 | End: 2023-02-16 | Stop reason: HOSPADM

## 2023-02-10 RX ORDER — SODIUM CHLORIDE, SODIUM LACTATE, POTASSIUM CHLORIDE, CALCIUM CHLORIDE 600; 310; 30; 20 MG/100ML; MG/100ML; MG/100ML; MG/100ML
9 INJECTION, SOLUTION INTRAVENOUS CONTINUOUS
Status: DISCONTINUED | OUTPATIENT
Start: 2023-02-10 | End: 2023-02-10

## 2023-02-10 RX ORDER — NALOXONE HCL 0.4 MG/ML
0.4 VIAL (ML) INJECTION
Status: DISCONTINUED | OUTPATIENT
Start: 2023-02-10 | End: 2023-02-16 | Stop reason: HOSPADM

## 2023-02-10 RX ORDER — ROCURONIUM BROMIDE 10 MG/ML
INJECTION, SOLUTION INTRAVENOUS AS NEEDED
Status: DISCONTINUED | OUTPATIENT
Start: 2023-02-10 | End: 2023-02-10 | Stop reason: SURG

## 2023-02-10 RX ORDER — UREA 10 %
1 LOTION (ML) TOPICAL NIGHTLY PRN
Status: DISCONTINUED | OUTPATIENT
Start: 2023-02-10 | End: 2023-02-16 | Stop reason: HOSPADM

## 2023-02-10 RX ORDER — ONDANSETRON 4 MG/1
4 TABLET, FILM COATED ORAL EVERY 6 HOURS PRN
Status: DISCONTINUED | OUTPATIENT
Start: 2023-02-10 | End: 2023-02-16 | Stop reason: HOSPADM

## 2023-02-10 RX ORDER — PROPOFOL 10 MG/ML
VIAL (ML) INTRAVENOUS AS NEEDED
Status: DISCONTINUED | OUTPATIENT
Start: 2023-02-10 | End: 2023-02-10 | Stop reason: SURG

## 2023-02-10 RX ORDER — MAGNESIUM HYDROXIDE 1200 MG/15ML
LIQUID ORAL AS NEEDED
Status: DISCONTINUED | OUTPATIENT
Start: 2023-02-10 | End: 2023-02-10 | Stop reason: HOSPADM

## 2023-02-10 RX ORDER — DOCUSATE SODIUM 100 MG/1
200 CAPSULE, LIQUID FILLED ORAL 2 TIMES DAILY
Status: DISCONTINUED | OUTPATIENT
Start: 2023-02-10 | End: 2023-02-16 | Stop reason: HOSPADM

## 2023-02-10 RX ORDER — LIDOCAINE HYDROCHLORIDE 20 MG/ML
INJECTION, SOLUTION INFILTRATION; PERINEURAL AS NEEDED
Status: DISCONTINUED | OUTPATIENT
Start: 2023-02-10 | End: 2023-02-10 | Stop reason: SURG

## 2023-02-10 RX ORDER — CEFAZOLIN SODIUM 2 G/100ML
2 INJECTION, SOLUTION INTRAVENOUS EVERY 8 HOURS
Status: COMPLETED | OUTPATIENT
Start: 2023-02-10 | End: 2023-02-11

## 2023-02-10 RX ORDER — SODIUM CHLORIDE 9 MG/ML
40 INJECTION, SOLUTION INTRAVENOUS AS NEEDED
Status: DISCONTINUED | OUTPATIENT
Start: 2023-02-10 | End: 2023-02-10 | Stop reason: HOSPADM

## 2023-02-10 RX ORDER — ONDANSETRON 2 MG/ML
4 INJECTION INTRAMUSCULAR; INTRAVENOUS EVERY 6 HOURS PRN
Status: DISCONTINUED | OUTPATIENT
Start: 2023-02-10 | End: 2023-02-16 | Stop reason: HOSPADM

## 2023-02-10 RX ORDER — DIPHENHYDRAMINE HCL 25 MG
25 CAPSULE ORAL EVERY 6 HOURS PRN
Status: DISCONTINUED | OUTPATIENT
Start: 2023-02-10 | End: 2023-02-16 | Stop reason: HOSPADM

## 2023-02-10 RX ORDER — CEFAZOLIN SODIUM 2 G/100ML
2 INJECTION, SOLUTION INTRAVENOUS ONCE
Status: COMPLETED | OUTPATIENT
Start: 2023-02-10 | End: 2023-02-10

## 2023-02-10 RX ORDER — SODIUM CHLORIDE 9 MG/ML
40 INJECTION, SOLUTION INTRAVENOUS AS NEEDED
Status: DISCONTINUED | OUTPATIENT
Start: 2023-02-10 | End: 2023-02-16 | Stop reason: HOSPADM

## 2023-02-10 RX ORDER — HYDROCODONE BITARTRATE AND ACETAMINOPHEN 5; 325 MG/1; MG/1
1 TABLET ORAL EVERY 4 HOURS PRN
Status: DISCONTINUED | OUTPATIENT
Start: 2023-02-10 | End: 2023-02-16 | Stop reason: HOSPADM

## 2023-02-10 RX ORDER — ERGOCALCIFEROL 1.25 MG/1
50000 CAPSULE ORAL
Status: DISCONTINUED | OUTPATIENT
Start: 2023-02-10 | End: 2023-02-16 | Stop reason: HOSPADM

## 2023-02-10 RX ORDER — SODIUM CHLORIDE 0.9 % (FLUSH) 0.9 %
10 SYRINGE (ML) INJECTION AS NEEDED
Status: DISCONTINUED | OUTPATIENT
Start: 2023-02-10 | End: 2023-02-10 | Stop reason: HOSPADM

## 2023-02-10 RX ORDER — SODIUM CHLORIDE, SODIUM LACTATE, POTASSIUM CHLORIDE, CALCIUM CHLORIDE 600; 310; 30; 20 MG/100ML; MG/100ML; MG/100ML; MG/100ML
100 INJECTION, SOLUTION INTRAVENOUS CONTINUOUS
Status: DISCONTINUED | OUTPATIENT
Start: 2023-02-10 | End: 2023-02-11

## 2023-02-10 RX ORDER — MORPHINE SULFATE 2 MG/ML
2 INJECTION, SOLUTION INTRAMUSCULAR; INTRAVENOUS
Status: DISCONTINUED | OUTPATIENT
Start: 2023-02-10 | End: 2023-02-16 | Stop reason: HOSPADM

## 2023-02-10 RX ORDER — FENTANYL CITRATE 50 UG/ML
INJECTION, SOLUTION INTRAMUSCULAR; INTRAVENOUS AS NEEDED
Status: DISCONTINUED | OUTPATIENT
Start: 2023-02-10 | End: 2023-02-10 | Stop reason: SURG

## 2023-02-10 RX ORDER — DEXAMETHASONE SODIUM PHOSPHATE 4 MG/ML
INJECTION, SOLUTION INTRA-ARTICULAR; INTRALESIONAL; INTRAMUSCULAR; INTRAVENOUS; SOFT TISSUE AS NEEDED
Status: DISCONTINUED | OUTPATIENT
Start: 2023-02-10 | End: 2023-02-10 | Stop reason: SURG

## 2023-02-10 RX ORDER — SODIUM CHLORIDE 0.9 % (FLUSH) 0.9 %
10 SYRINGE (ML) INJECTION EVERY 12 HOURS SCHEDULED
Status: DISCONTINUED | OUTPATIENT
Start: 2023-02-10 | End: 2023-02-10 | Stop reason: HOSPADM

## 2023-02-10 RX ORDER — GLYCOPYRROLATE 0.2 MG/ML
INJECTION INTRAMUSCULAR; INTRAVENOUS AS NEEDED
Status: DISCONTINUED | OUTPATIENT
Start: 2023-02-10 | End: 2023-02-10 | Stop reason: SURG

## 2023-02-10 RX ADMIN — FENTANYL CITRATE 25 MCG: 50 INJECTION, SOLUTION INTRAMUSCULAR; INTRAVENOUS at 14:48

## 2023-02-10 RX ADMIN — FENTANYL CITRATE 25 MCG: 50 INJECTION, SOLUTION INTRAMUSCULAR; INTRAVENOUS at 14:01

## 2023-02-10 RX ADMIN — CEFAZOLIN SODIUM 2 G: 2 INJECTION, SOLUTION INTRAVENOUS at 20:13

## 2023-02-10 RX ADMIN — SODIUM CHLORIDE, POTASSIUM CHLORIDE, SODIUM LACTATE AND CALCIUM CHLORIDE 100 ML/HR: 600; 310; 30; 20 INJECTION, SOLUTION INTRAVENOUS at 18:52

## 2023-02-10 RX ADMIN — GLYCOPYRROLATE 0.1 MG: 1 INJECTION INTRAMUSCULAR; INTRAVENOUS at 14:48

## 2023-02-10 RX ADMIN — MORPHINE SULFATE 2 MG: 2 INJECTION, SOLUTION INTRAMUSCULAR; INTRAVENOUS at 07:29

## 2023-02-10 RX ADMIN — SUGAMMADEX 400 MG: 100 INJECTION, SOLUTION INTRAVENOUS at 16:34

## 2023-02-10 RX ADMIN — PROPOFOL 20 MG: 10 INJECTION, EMULSION INTRAVENOUS at 14:49

## 2023-02-10 RX ADMIN — ACETAMINOPHEN 1000 MG: 500 TABLET, FILM COATED ORAL at 04:00

## 2023-02-10 RX ADMIN — MORPHINE SULFATE 2 MG: 2 INJECTION, SOLUTION INTRAMUSCULAR; INTRAVENOUS at 17:46

## 2023-02-10 RX ADMIN — FENTANYL CITRATE 25 MCG: 50 INJECTION, SOLUTION INTRAMUSCULAR; INTRAVENOUS at 14:56

## 2023-02-10 RX ADMIN — PROPOFOL 80 MG: 10 INJECTION, EMULSION INTRAVENOUS at 14:48

## 2023-02-10 RX ADMIN — ACETAMINOPHEN 650 MG: 325 TABLET, FILM COATED ORAL at 00:28

## 2023-02-10 RX ADMIN — TRANEXAMIC ACID 1000 MG: 100 INJECTION INTRAVENOUS at 15:16

## 2023-02-10 RX ADMIN — DEXAMETHASONE SODIUM PHOSPHATE 6 MG: 4 INJECTION, SOLUTION INTRAMUSCULAR; INTRAVENOUS at 14:48

## 2023-02-10 RX ADMIN — SODIUM CHLORIDE, POTASSIUM CHLORIDE, SODIUM LACTATE AND CALCIUM CHLORIDE 9 ML/HR: 600; 310; 30; 20 INJECTION, SOLUTION INTRAVENOUS at 14:01

## 2023-02-10 RX ADMIN — CARVEDILOL 12.5 MG: 12.5 TABLET, FILM COATED ORAL at 12:06

## 2023-02-10 RX ADMIN — ROCURONIUM BROMIDE 40 MG: 50 INJECTION INTRAVENOUS at 14:49

## 2023-02-10 RX ADMIN — LIDOCAINE HYDROCHLORIDE 80 MG: 20 INJECTION, SOLUTION INFILTRATION; PERINEURAL at 14:48

## 2023-02-10 RX ADMIN — CEFAZOLIN SODIUM 2 G: 2 INJECTION, SOLUTION INTRAVENOUS at 14:36

## 2023-02-10 RX ADMIN — OXYCODONE 2.5 MG: 5 TABLET ORAL at 17:32

## 2023-02-10 RX ADMIN — MORPHINE SULFATE 2 MG: 2 INJECTION, SOLUTION INTRAMUSCULAR; INTRAVENOUS at 20:57

## 2023-02-10 RX ADMIN — Medication 10 ML: at 23:05

## 2023-02-10 RX ADMIN — FAMOTIDINE 20 MG: 10 INJECTION INTRAVENOUS at 14:00

## 2023-02-10 NOTE — PLAN OF CARE
Goal Outcome Evaluation:               Attempted to see for re-evaluation of swallow on this date, however, patient is NPO at this time for procedure today. SLP to follow up as schedule permits.

## 2023-02-10 NOTE — ANESTHESIA PROCEDURE NOTES
Airway  Urgency: elective    Date/Time: 2/10/2023 2:51 PM  Airway not difficult    General Information and Staff    Patient location during procedure: OR  Anesthesiologist: Tomer Boles MD  CRNA/CAA: Sera Juarez CRNA    Indications and Patient Condition  Indications for airway management: airway protection    Preoxygenated: yes  MILS maintained throughout  Mask difficulty assessment: 2 - vent by mask + OA or adjuvant +/- NMBA    Final Airway Details  Final airway type: endotracheal airway      Successful airway: ETT  Cuffed: yes   Successful intubation technique: direct laryngoscopy  Endotracheal tube insertion site: oral  Blade: Norm  Blade size: 3  ETT size (mm): 7.5  Cormack-Lehane Classification: grade IIa - partial view of glottis  Placement verified by: chest auscultation and capnometry   Cuff volume (mL): 6  Measured from: gums  ETT/EBT to gums (cm): 21  Number of attempts at approach: 1  Assessment: lips, teeth, and gum same as pre-op and atraumatic intubation

## 2023-02-10 NOTE — CASE MANAGEMENT/SOCIAL WORK
Continued Stay Note  [facility]     Patient Name: aCra Garvey  MRN: [id number]  Today's Date: 2/10/2023    Admit Date: 2/7/2023    Plan: SNF- pending clinical course   Discharge Plan     Row Name 02/10/23 1048       Plan    Plan SNF- pending clinical course    Patient/Family in Agreement with Plan yes    Plan Comments Spoke with Joanne/Ester who stated she would follow patient for bed availability after hip surgery at [facility]. Updated patient's daughter- Concha at bedside who was agreeable. Discussed other SNF choices and she stated they wanted to see how patient was doing after surgery before making any other SNF choices. CCP to follow. Earl MARY RN               Discharge Codes    No documentation.               Expected Discharge Date and Time     Expected Discharge Date Expected Discharge Time    Feb 11, 2023             Earl Damon RN

## 2023-02-10 NOTE — ANESTHESIA POSTPROCEDURE EVALUATION
"Patient: Cara Garvey    Procedure Summary     Date: 02/10/23 Room / Location: [facility] OR  / [facility] MAIN OR    Anesthesia Start: 1444 Anesthesia Stop: 1700    Procedure: RIGHT BIPOLAR  HIP/ POSTERIOR (Right: Hip) Diagnosis:     Surgeons: Gee Lawler MD Provider: Tomer Boles MD    Anesthesia Type: general ASA Status: 3          Anesthesia Type: general    Vitals  Vitals Value Taken Time   /100 02/10/23 1806   Temp 37.1 °C (98.8 °F) 02/10/23 1656   Pulse 64 02/10/23 1813   Resp 16 02/10/23 1656   SpO2 97 % 02/10/23 1813   Vitals shown include unvalidated device data.        Post Anesthesia Care and Evaluation    Patient location during evaluation: bedside  Patient participation: complete - patient participated  Level of consciousness: awake and alert  Pain management: adequate    Airway patency: patent  Anesthetic complications: No anesthetic complications    Cardiovascular status: acceptable  Respiratory status: acceptable  Hydration status: acceptable    Comments: /54 (BP Location: Left arm, Patient Position: Lying)   Pulse 98   Temp 37.2 °C (99 °F) (Oral)   Resp 16   Ht 170.2 cm (67\")   Wt 79.6 kg (175 lb 7.8 oz)   SpO2 99%   BMI 27.49 kg/m²       "

## 2023-02-10 NOTE — DISCHARGE PLACEMENT REQUEST
"Juancarlos Woo (88 y.o. Male)     Date of Birth   1934    Social Security Number       Address   [address] [place] [address]    Home Phone   [phone]    MRN   [id number]       [employer]   [place]    Marital Status   Single                            Admission Date   2/7/23    Admission Type   Emergency    Admitting Provider   Krishna Moon MD    Attending Provider   Krishna Moon MD    Department, Room/Bed   [address], P384/1       Discharge Date       Discharge Disposition       Discharge Destination                               Attending Provider: Krishna Moon MD    Allergies: Lisinopril    Isolation: None   Infection: None   Code Status: CPR    Ht: 170.2 cm (67\")   Wt: 79.6 kg (175 lb 7.8 oz)    Admission Cmt: None   Principal Problem: Closed fracture of proximal end of right humerus, unspecified fracture morphology, initial encounter [S42.201A]                 Active Insurance as of 2/7/2023     Primary Coverage     Payor Plan Insurance Group Employer/Plan Group    MEDICARE MEDICARE A & B      Payor Plan Address Payor Plan Phone Number Payor Plan Fax Number Effective Dates    [address] [phone]  10/1/1999 - None Entered    [facility] [id number]       Subscriber Name Subscriber Birth Date Member ID       JUANCARLOS WOO 1934 [id number]           Secondary Coverage     Payor Plan Insurance Group Employer/Plan Group    AARP MC SUP AAR HEALTH CARE OPTIONS      Payor Plan Address Payor Plan Phone Number Payor Plan Fax Number Effective Dates    [facility] [phone]  1/1/2023 - None Entered    [address]       [facility] [address]       Subscriber Name Subscriber Birth Date Member ID       JUANCARLOS WOO 1934 [id number]                 Emergency Contacts      (Rel.) Home Phone Work Phone Mobile Phone    JJ KAPLAN (Daughter) -- -- [phone]    HernánBrittany prakash (Daughter) [phone] -- --              "

## 2023-02-10 NOTE — OP NOTE
HIP ENDOPROSTHESIS  Procedure Note    Estel Guru  2/10/2023    Pre-op Diagnosis: Right  Femoral neck fracture, displaced  Post-op Diagnosis: Right Femoral neck fracture, displaced.  Right Abductor Tendon Tear  Procedure:  Posterior approach Right  Bipolar arthroplasty, Abductor Tendon Repair  Surgeon:  Gee Lawler MD  Anesthesia: General, Anesthesiologist: Tomer Boles MD  CRNA: Sera Juarez CRNA  Staff: Circulator: Tashia Harvey RN; Marlene De Dios RN  Scrub Person: Nela Burt  Vendor Representative: Bentley Romo  Assistant: Eligio Heaton III, PA-C  Estimated Blood Loss: 100 ml  Specimens: * No orders in the log *  Drains: none  Complications: None    Components Utilized:       Implant Name Type Inv. Item Serial No.  Lot No. LRB No. Used Action   DEV CONTRL TISS STRATAFIX SYMM PDS PLUS CAL CT-1 60CM - [id number] Implant DEV CONTRL TISS STRATAFIX SYMM PDS PLUS CAL CT-1 60CM  [employer] . Right 1 Implanted   DEV CONTRL TISS STRATAFIX SPIRAL MNCRYL PLS PS2 3/0 30CM UD - [id number] Implant DEV CONTRL TISS STRATAFIX SPIRAL MNCRYL PLS PS2 3/0 30CM UD  [employer]  DIV OF J AND J [facility] Right 1 Implanted   CMT BONE PALACOS R HI/VISC 1X40 - [id number] Implant CMT BONE PALACOS R HI/VISC 1X40  [facility] [id number] Right 2 Implanted   STEM FEM/HIP AVENIR CMT LAT SZ5 - [id number] Implant STEM FEM/HIP AVENIR CMT LAT SZ5  [employer] [id number] Right 1 Implanted   HD FEM/HIP UNIV COCR 12/14TPR 28MM MIN3.5 - [id number] Implant HD FEM/HIP UNIV COCR 12/14TPR 28MM MIN3.5  [employer] [id number] Right 1 Implanted   LINER VERSYS ASMBL BIPOL 50/51/52MM OD X28MM - [id number] Implant LINER VERSYS ASMBL BIPOL 50/51/52MM OD X28MM  [employer] [id number] Right 1 Implanted   SHLL VERSYS M/BIPOL MTL OD 50MM - [id number] Implant SHLL VERSYS M/BIPOL MTL OD 50MM  [employer] [id number] Right 1 Implanted       Indication for Procedure:   The patient is a 88 y.o. male presents today for a  bipolar arthroplasty  procedure because of fracture of the left femoral neck.  The patient and family was educated in risks of surgery that could include possible risk of infection, deep venous thrombosis, pulmonary embolism, fracture, neurovascular injury, leg length discrepancy, dislocation, possible persistent pain, need for additional surgeries, anesthetic risks, medical risks including heart attack and stroke, and death.  The discussion occurred in the pre-operative area, and patient had the opportunity to ask questions, and concerns about the proposed surgery.  They also understood that medicine is not an exact science, and that outcomes of the surgical procedure may be less than desired. The patient's daughter wished to proceed.      Protocols for intravenous antibiotics and venous thrombosis were followed for this patient.  IV antibiotics were infused prior to surgery and will be discontinued within 24 hours of completion of the surgical procedure.  Thrombosis prophylaxis will be initiated within 24 hours of the completion of the surgical procedure.      Procedure:   After the patient was identified in the preoperative holding area, the operative site was marked and confirmed. The patient was brought to the operating suite, placed supine on the operating table. General endotracheal anesthesia was placed uneventfully. The patient was placed in the lateral decubitus position and was secured to the bed with operating table with a peg and post system. The operative extremity was then prepped and draped in the usual sterile fashion. Timeout procedure was performed. IV Ancef antibiotics were infused.  Then using a posterior lateral approach was utilized a #10 blade scalpel. The IT band was split longitudinally. The gluteus cheryl was split proximally. It was noted that the hip abductors were mostly torn on the inferior 2/3rd off the greater trochanter.  The short external rotators and posterior hip capsule was  released and the hip joint was exposed posteriorly. The femoral head was removed and measured the above size.  All loose bone was removed from the acetabulum.   Following this, the femoral retractors were placed. The box-cutter was used for initial entry point into the proximal femur followed by the rattail rasp.  The starter broach was then used, and sequentially increased until the above final size, which had a good cortical chatter and good rotational stability. The trial neck and head balls were placed on the broach. The above head ball was placed and the hip was reduced and found to be stable in all planes. Trial stem, head ball, and neck were removed. The canal was prepared using standard cementing techniques.  The canal restrictor was placed 5 mm below length of stem.  Canal brush was used and the canal was irrigated and throughly dried.  Cement was mixed under vacuum suction and then placed into the femoral canal and filled retrograde.  It was pressuried proximally and the final stem components was impacted into place.  The cement was allowed to harden.  Then the hip was again reduced with the trial femoral head -3.5mm in length, 50mm in diameter, checked for stability, both anteriorly and posteriorly.  The hip was dislocated and the trial head ball was removed and the final bipolar head was assembled on the back table and impacted onto a dry trunion.  The hip was reduced and copiously irrigated with 3 liters of normal saline laced with betadine using pulsatile lavage. External rotators and piriformis were repaired with #0 Vicryl interrupted fashion, The hip abductors was repaired with #2 stratafix.  #1 Stratafix was used to close the IT band and the gluteus cheryl longitudinal fashion, 2-0 vicryl to close the deep dermis, and 3-0 monocryl was used to close the skin. Skin glue was applied.  Sterile dressings were applied.  A hip abduction pillow was placed.  The patient was awakened from anesthesia and  returned to recovery room in stable condition. No intraoperative complications.    Rehab instructions.  Dressing is waterproof, May shower. He can be WBAT, but no hip abduction exercises.  Posterior hip dislocation precautions for 6 weeks.  Remove dressing in 7 days.  Follow up in office in 2 weeks.  [id number] office.      Gee Lawler MD     Date: 2/10/2023  Time: 16:35 EST

## 2023-02-10 NOTE — ANESTHESIA PREPROCEDURE EVALUATION
Anesthesia Evaluation     Patient summary reviewed and Nursing notes reviewed   no history of anesthetic complications:  NPO Solid Status: > 6 hours  NPO Liquid Status: > 6 hours           Airway   Mallampati: II  TM distance: >3 FB  Neck ROM: full  no difficulty expected and No difficulty expected  Dental - normal exam   (+) edentulous    Pulmonary - negative pulmonary ROS and normal exam    breath sounds clear to auscultation  (-) rhonchi, decreased breath sounds, wheezes, rales, stridor  Cardiovascular - normal exam    NYHA Classification: I  Patient on routine beta blocker  Rhythm: regular  Rate: normal    (+) hypertension, CAD, hyperlipidemia,   (-) murmur, weak pulses, friction rub, systolic click, carotid bruits, JVD, peripheral edema      Neuro/Psych- negative ROS  GI/Hepatic/Renal/Endo    (+)  GERD,  thyroid problem hypothyroidism    Musculoskeletal (-) negative ROS    Abdominal  - normal exam    Abdomen: soft.   Substance History - negative use     OB/GYN negative ob/gyn ROS         Other - negative ROS                     Anesthesia Plan    ASA 3     general     intravenous induction     Anesthetic plan, risks, benefits, and alternatives have been provided, discussed and informed consent has been obtained with: patient.        CODE STATUS:    Code Status (Patient has no pulse and is not breathing): CPR (Attempt to Resuscitate)  Medical Interventions (Patient has pulse or is breathing): Full Support

## 2023-02-10 NOTE — PLAN OF CARE
Goal Outcome Evaluation:  Plan of Care Reviewed With: patient        Progress: no change  Outcome Evaluation: NPO. VSS. Pt reports pain and PRN pain meds given as ordered. Pt alert to self.

## 2023-02-10 NOTE — PROGRESS NOTES
Name: Cara Garvey ADMIT: 2023   : 1934  PCP: Princess Yi MD    MRN: [id number] LOS: 1 days   AGE/SEX: 88 y.o. male  ROOM: [facility] Main OR/MAIN OR     Subjective   Subjective   Pt seen in PACU. C/o pain in right hip. No pain in shoulder at present. Mild SOA. No CP or palp. No N/V. No diaphoresis.    Review of Systems   as above    Objective   Objective   Vital Signs  Temp:  [96.8 °F (36 °C)-98.8 °F (37.1 °C)] 98.8 °F (37.1 °C)  Heart Rate:  [58-73] 68  Resp:  [16-20] 16  BP: (121-158)/(51-93) 125/93  SpO2:  [91 %-100 %] 98 %  on  Flow (L/min):  [6] 6;   Device (Oxygen Therapy): nasal cannula  Body mass index is 27.49 kg/m².  Physical Exam  Vitals and nursing note reviewed. Exam conducted with a chaperone present (PACU RN).   Constitutional:       General: He is not in acute distress.     Appearance: He is ill-appearing (chronically). He is not toxic-appearing or diaphoretic.   HENT:      Head: Normocephalic.      Nose: Nose normal.      Mouth/Throat:      Mouth: Mucous membranes are moist.      Pharynx: Oropharynx is clear.      Comments: Edentulous  Eyes:      General: No scleral icterus.        Right eye: No discharge.         Left eye: No discharge.      Extraocular Movements: Extraocular movements intact.      Conjunctiva/sclera: Conjunctivae normal.   Cardiovascular:      Rate and Rhythm: Normal rate and regular rhythm.      Pulses: Normal pulses.   Pulmonary:      Effort: Pulmonary effort is normal. No respiratory distress.      Breath sounds: Normal breath sounds. No wheezing or rales.   Abdominal:      General: Bowel sounds are normal. There is no distension.      Palpations: Abdomen is soft.      Tenderness: There is no abdominal tenderness.   Musculoskeletal:         General: No swelling or deformity.      Cervical back: Neck supple. No rigidity.      Comments: RUE in sling, NVI in distal RUE  NVI in distal RLE   Lymphadenopathy:      Cervical: No cervical adenopathy.   Skin:      General: Skin is warm and dry.      Capillary Refill: Capillary refill takes less than 2 seconds.      Coloration: Skin is not jaundiced.      Findings: No rash.   Neurological:      Mental Status: He is alert. Mental status is at baseline.      Cranial Nerves: No cranial nerve deficit.      Coordination: Coordination normal.      Comments: Oriented to person   Psychiatric:         Mood and Affect: Mood normal.         Behavior: Behavior normal.      Comments: Pleasant       Results Review     I reviewed the patient's new clinical results.  Results from last 7 days   Lab Units 02/10/23  0738 02/09/23  0652 02/08/23  1123 02/07/23  1516   WBC 10*3/mm3 8.05 8.28 8.57 10.55   HEMOGLOBIN g/dL 10.5* 10.1* 9.9* 11.5*   PLATELETS 10*3/mm3 124* 116* 148 179     Results from last 7 days   Lab Units 02/10/23  0738 02/09/23  0652 02/07/23  1516   SODIUM mmol/L 139 138 137   POTASSIUM mmol/L 3.7 4.5 3.8   CHLORIDE mmol/L 104 105 104   CO2 mmol/L 21.1* 18.9* 27.0   BUN mg/dL 24* 25* 20   CREATININE mg/dL 0.95 0.98 1.02   GLUCOSE mg/dL 101* 91 137*   EGFR mL/min/1.73 77.0 74.2 70.7     Results from last 7 days   Lab Units 02/10/23  0738   ALBUMIN g/dL 3.0*     Results from last 7 days   Lab Units 02/10/23  0738 02/09/23  0652 02/07/23  1516   CALCIUM mg/dL 7.8* 7.8* 8.5*   ALBUMIN g/dL 3.0*  --   --        Hemoglobin A1C   Date/Time Value Ref Range Status   02/09/2023 0652 5.60 4.80 - 5.60 % Final       XR Elbow 2 View Right    Result Date: 2/9/2023   As described.  This report was finalized on 2/9/2023 1:30 PM by Dr. Eddie Jones M.D.      XR Hip 1 View Without Pelvis Right (Surgery Only)    Result Date: 2/10/2023   Postsurgical changes.    This report was finalized on 2/10/2023 5:33 PM by Dr. Eddie Jones M.D.      XR Hip With or Without Pelvis 2 - 3 View Right    Result Date: 2/9/2023   Right femoral neck fracture.  This report was finalized on 2/9/2023 1:29 PM by Dr. Eddie Jones M.D.      I have personally  reviewed all medications:  Scheduled Medications  [MAR Hold] acetaminophen, 1,000 mg, Oral, Q8H  [MAR Hold] atorvastatin, 20 mg, Oral, Daily  carvedilol, 12.5 mg, Oral, BID With Meals  [MAR Hold] cholecalciferol, 1,000 Units, Oral, Daily  [MAR Hold] ethyl alcohol, 2 swab, Nasal, Once  [MAR Hold] finasteride, 5 mg, Oral, Daily  [MAR Hold] levothyroxine, 25 mcg, Oral, Q AM  [MAR Hold] melatonin, 6 mg, Oral, Nightly  [MAR Hold] memantine, 10 mg, Oral, Q12H  [MAR Hold] senna-docusate sodium, 2 tablet, Oral, BID  [MAR Hold] sertraline, 50 mg, Oral, Daily  [MAR Hold] tamsulosin, 0.4 mg, Oral, Nightly  vitamin D, 50,000 Units, Oral, Q7 Days    Infusions  lactated ringers, 9 mL/hr, Last Rate: 9 mL/hr (02/10/23 1444)  sodium chloride, 75 mL/hr, Last Rate: 75 mL/hr (02/09/23 1752)    Diet  NPO Diet NPO Type: Strict NPO    I have personally reviewed:  [x]  Laboratory   [x]  Microbiology   [x]  Radiology   []  EKG/Telemetry  []  Cardiology/Vascular   []  Pathology    [x]  Records       Assessment/Plan     Active Hospital Problems    Diagnosis  POA   • **Fracture of femoral neck, right (HCC) [S72.001A]  Yes   • Moderate dementia without behavioral disturbance [F03.B0]  Yes   • Vitamin D deficiency [E55.9]  Yes   • CAD (coronary artery disease) [I25.10]  Yes   • GERD (gastroesophageal reflux disease) [K21.9]  Yes   • Closed fracture of proximal end of right humerus, unspecified fracture morphology, initial encounter [S42.201A]  Yes   • HTN (hypertension) [I10]  Yes   • HLD (hyperlipidemia) [E78.5]  Yes      Resolved Hospital Problems   No resolved problems to display.       Mr. Garvey is a 88 y.o. non-smoker with a history of CAD s/p stent to LAD, HTN, HLD, and GERD, who presented to [facility] to shoulder pain after unwitnessed mechanical fall.     Right proximal humerus fracture. Orthopedic Surgery evaluated, recommend conservative management, no surgery required. Continue to utilize sling, can remove  sling for hygiene as well as regular elbow motion as tolerated. Follow-up with Orthopedic Surgery in 2-3 weeks as outpt.     Right femoral neck fracture.  Patient complaied of acute right hip pain after admission, x-ray showed right femoral neck fracture. Orthopedic Surgery consulted. Pt underwent bipolar arthroplasty and abductor tendon repair today by Dr. Lawler. Defer DVT ppx to him. Continue SCDs for now.     Hypocalcemia.  Calcium 7.8 again this morning. Corrects to 8.2 given low albumin and ionized calcium is wnl. Vitamin D only 21.4. Will start oral ergocalciferol.     Dementia: Patient oriented to name. Continue home Namenda. Patient resides in memory care unit.     Essential hypertension: BP stable. Continue home carvedilol. Consider restarting Losartan tomorrow.     Hypothyroidism: Continue levothyroxine. Check TSH.     Anemia of chronic disease: Iron studies consistent anemia of chronic disease. B12 and folate normal. Hgb stable. Monitor postop.    CAD: no CP or SOA. Continue BB and statin. Not on antiplatelet prior to admission.       · SCDs for DVT prophylaxis.  · Full code.  · Discussed with patient and RN.  · Anticipate discharge to SNU facility, timing yet to be determined.      Krishna Moon MD  [facility]  02/10/23  17:43 EST

## 2023-02-10 NOTE — CONSULTS
ORTHOPEDIC SURGERY CONSULT      Patient: Cara Garvey  Date of Admission: 2/7/2023  3:01 PM  YOB: 1934  Medical Record Number: [id number]  Attending Physician: Krishna Moon MD  Consulting Provider: LEWIS El    CHIEF COMPLIANT: Right hip pain    HISTORY OF PRESENT ILLINESS: Patient is a 88 y.o. year old male presents to  with above complaints.  He reportedly experienced an unwitnessed fall in the [facility] care unit where he resides.  This was about 3 days ago.  He had significant pain in his right shoulder and was brought to the emergency department.  He was diagnosed with a proximal humerus fracture, seen, and evaluated.  It was later that he began to complain about pain in his hip.  Additional radiographs were ordered and he was found to also have suffered a femoral neck fracture.  Orthopedics was consulted for further evaluation and treatment.  No new injuries are reported.  Patient reports only mild discomfort at the hip, although it is worsened significantly with motion.  His pain is localized to the hip and is nonradicular.  He remains pleasantly confused at baseline    ALLERGIES:   Allergies   Allergen Reactions   • Lisinopril Unknown - High Severity       HOME MEDICATIONS:  Medications Prior to Admission   Medication Sig Dispense Refill Last Dose   • acetaminophen (TYLENOL) 325 MG tablet Take 650 mg by mouth Every 8 (Eight) Hours As Needed for Mild Pain.   2/8/2023   • albuterol sulfate  (90 Base) MCG/ACT inhaler Inhale 2 puffs Every 6 (Six) Hours As Needed for Wheezing.   2/8/2023   • alfuzosin (UROXATRAL) 10 MG 24 hr tablet Take 10 mg by mouth Daily.   2/8/2023   • carvedilol (COREG) 12.5 MG tablet Take 12.5 mg by mouth 2 (Two) Times a Day With Meals.   2/8/2023   • cholecalciferol (VITAMIN D3) 25 MCG (1000 UT) tablet Take 1,000 Units by mouth Daily.   2/8/2023   • dextromethorphan-guaifenesin (ROBITUSSIN-DM)  MG/5ML syrup Take 5  mL by mouth Every 4 (Four) Hours As Needed (Cough).   2/8/2023   • finasteride (PROSCAR) 5 MG tablet Take 5 mg by mouth Daily.   2/8/2023   • folic acid (FOLVITE) 1 MG tablet Take 1 mg by mouth Daily.   2/8/2023   • levothyroxine (SYNTHROID, LEVOTHROID) 25 MCG tablet Take 25 mcg by mouth Every Morning.   2/8/2023   • losartan (COZAAR) 25 MG tablet Take 25 mg by mouth Daily.   2/8/2023   • melatonin 3 MG tablet Take 6 mg by mouth Every Night.   2/8/2023   • memantine (NAMENDA) 10 MG tablet Take 10 mg by mouth 2 (Two) Times a Day.   2/8/2023   • omeprazole (priLOSEC) 20 MG capsule Take 20 mg by mouth Daily.   2/8/2023   • sertraline (ZOLOFT) 50 MG tablet Take 50 mg by mouth Daily.   2/8/2023   • simvastatin (ZOCOR) 40 MG tablet Take 40 mg by mouth Daily.   2/8/2023       CURRENT MEDICATIONS:  Scheduled Meds:acetaminophen, 1,000 mg, Oral, Q8H  atorvastatin, 20 mg, Oral, Daily  carvedilol, 12.5 mg, Oral, BID With Meals  cholecalciferol, 1,000 Units, Oral, Daily  ethyl alcohol, 2 swab, Nasal, Once  finasteride, 5 mg, Oral, Daily  levothyroxine, 25 mcg, Oral, Q AM  melatonin, 6 mg, Oral, Nightly  memantine, 10 mg, Oral, Q12H  pantoprazole, 40 mg, Oral, Q AM  senna-docusate sodium, 2 tablet, Oral, BID  sertraline, 50 mg, Oral, Daily  tamsulosin, 0.4 mg, Oral, Nightly      Continuous Infusions:sodium chloride, 75 mL/hr, Last Rate: 75 mL/hr (02/09/23 4411)      PRN Meds:.•  acetaminophen  •  aluminum-magnesium hydroxide-simethicone  •  senna-docusate sodium **AND** polyethylene glycol **AND** bisacodyl **AND** bisacodyl  •  melatonin  •  Morphine  •  ondansetron **OR** ondansetron  •  oxyCODONE  •  sodium chloride    History reviewed. No pertinent past medical history.  History reviewed. No pertinent surgical history.  Social History     Occupational History   • Not on file   Tobacco Use   • Smoking status: Never   • Smokeless tobacco: Not on file   Vaping Use   • Vaping Use: Never used   Substance and Sexual Activity   •  Alcohol use: Not Currently     Comment: Has been in a nursing home for over 3 months   • Drug use: Never   • Sexual activity: Not Currently     Partners: Female      Social History     Social History Narrative   • Not on file     History reviewed. No pertinent family history.    REVIEW OF SYSTEMS:    HEENT: Patient denies any headaches, vision changes, change in hearing, or tinnitus, Patient denies epistaxis, sinus pain, hoarseness, or dysphagia   Pulmonary: Patient denies any cough, congestion, acute change in SOA or wheezing.   Cardiovascular: Patient denies any change in chest pain, dyspnea, palpitations, weakness, intolerance of exercise, varicosities, change in murmur   Gastrointestinal:  Patient denies change in appetite, melena, change in bowel habits.   Genital/Urinary: Patient denies dysuria, change in color of urine, change in frequency of urination, pain with urgency, change in incontinence, retention.   Musculoskeletal: Patient denies complaints of acute changes in symptoms of other joints not mentioned above.   Neurological: Patient denies changes in dizziness, tremor, ataxia, or difficulty in speaking or changes in memory.   Endocrine system: Patient denies acute changes in tremors, palpitations, polyuria, polydipsia, polyphagia, diaphoresis, exophthalmos, or goiter.   Psychological: Patient denies thoughts/plans or harming self or other; denies acute changes in depression,  insomnia, night terrors, melissa, disorientation.   Skin: Patient denies any bruising, rashes, discoloration, pruritus,or wounds not mentioned in history of present illness or chief complaint above.   Hematopoietic: Patient denies current bleeding, epistaxis, hematuria, or melena.    PHYSICAL EXAM:   Vitals:  Vitals:    02/09/23 1054 02/09/23 1503 02/09/23 2019 02/10/23 0359   BP: 109/56 111/45 121/51 136/62   BP Location: Left arm Left arm Left arm    Patient Position: Lying Lying Lying    Pulse: 65 65 66 58   Resp: 18 17 18 17    Temp: 97.2 °F (36.2 °C) 97.3 °F (36.3 °C) 97.6 °F (36.4 °C) 97.3 °F (36.3 °C)   TempSrc: Oral Oral Oral    SpO2: 91% 92% 91% 92%   Weight:       Height:           General:  88 y.o. male who appears about stated age.    Alert, cooperative, in no acute distress         Head:    Normocephalic, without obvious abnormality, atraumatic   Eyes:            Lids and lashes normal, conjunctivae and sclerae normal, no         icterus, no pallor, corneas clear, PERRLA   Ears:    Ears appear intact with no abnormalities noted   Throat:   No oral lesions, no thrush, oral mucosa moist   Neck:   No adenopathy, supple, trachea midline, no JVD   Back:     Limited exam shows no severe kyphosis present,no visible           erythema, no excessive  tenderness to palpation.    Lungs:     Respirations regular, even and unlabored.     Heart:    Normal rate, Pulses palpable   Chest Wall:    No abnormalities observed.   Abdomen:     Normal bowel sounds, no masses, no organomegaly, soft              non-tender, non-distended, no guarding, no rebound                      tenderness   Rectal:     Deferred   Pulses:   Pulses palpable and equal bilaterally   Skin:   No bleeding, bruising or rash   Lymph nodes:   No palpable adenopathy   Extremities:     Right upper extremity remains immobilized in a sling for comfort at present.  Patient denies significant changes.  The right lower extremity is examined and demonstrates no grossly observable malalignment or bony deformity.  No tenderness to light palpation at the hip.  Positive logroll.  Compartments are soft.  The foot is warm and well-perfused.  Neurovascular status is at baseline with intact EHL, FHL, TA, GS function.  DP 2+    DIAGNOSTIC TEST:  Admission on 02/07/2023   Component Date Value Ref Range Status   • Extra Tube 02/07/2023 Hold for add-ons.   Final    Auto resulted.   • Extra Tube 02/07/2023 hold for add-on   Final    Auto resulted   • Extra Tube 02/07/2023 Hold for add-ons.    Final    Auto resulted.   • Extra Tube 02/07/2023 Hold for add-ons.   Final    Auto resulted   • Glucose 02/07/2023 137 (H)  65 - 99 mg/dL Final   • BUN 02/07/2023 20  8 - 23 mg/dL Final   • Creatinine 02/07/2023 1.02  0.76 - 1.27 mg/dL Final   • Sodium 02/07/2023 137  136 - 145 mmol/L Final   • Potassium 02/07/2023 3.8  3.5 - 5.2 mmol/L Final   • Chloride 02/07/2023 104  98 - 107 mmol/L Final   • CO2 02/07/2023 27.0  22.0 - 29.0 mmol/L Final   • Calcium 02/07/2023 8.5 (L)  8.6 - 10.5 mg/dL Final   • BUN/Creatinine Ratio 02/07/2023 19.6  7.0 - 25.0 Final   • Anion Gap 02/07/2023 6.0  5.0 - 15.0 mmol/L Final   • eGFR 02/07/2023 70.7  >60.0 mL/min/1.73 Final   • COVID19 02/07/2023 Not Detected  Not Detected - Ref. Range Final   • WBC 02/07/2023 10.55  3.40 - 10.80 10*3/mm3 Final   • RBC 02/07/2023 3.72 (L)  4.14 - 5.80 10*6/mm3 Final   • Hemoglobin 02/07/2023 11.5 (L)  13.0 - 17.7 g/dL Final   • Hematocrit 02/07/2023 34.6 (L)  37.5 - 51.0 % Final   • MCV 02/07/2023 93.0  79.0 - 97.0 fL Final   • MCH 02/07/2023 30.9  26.6 - 33.0 pg Final   • MCHC 02/07/2023 33.2  31.5 - 35.7 g/dL Final   • RDW 02/07/2023 12.4  12.3 - 15.4 % Final   • RDW-SD 02/07/2023 41.9  37.0 - 54.0 fl Final   • MPV 02/07/2023 10.1  6.0 - 12.0 fL Final   • Platelets 02/07/2023 179  140 - 450 10*3/mm3 Final   • Neutrophil % 02/07/2023 85.2 (H)  42.7 - 76.0 % Final   • Lymphocyte % 02/07/2023 7.7 (L)  19.6 - 45.3 % Final   • Monocyte % 02/07/2023 5.5  5.0 - 12.0 % Final   • Eosinophil % 02/07/2023 0.8  0.3 - 6.2 % Final   • Basophil % 02/07/2023 0.3  0.0 - 1.5 % Final   • Immature Grans % 02/07/2023 0.5  0.0 - 0.5 % Final   • Neutrophils, Absolute 02/07/2023 9.00 (H)  1.70 - 7.00 10*3/mm3 Final   • Lymphocytes, Absolute 02/07/2023 0.81  0.70 - 3.10 10*3/mm3 Final   • Monocytes, Absolute 02/07/2023 0.58  0.10 - 0.90 10*3/mm3 Final   • Eosinophils, Absolute 02/07/2023 0.08  0.00 - 0.40 10*3/mm3 Final   • Basophils, Absolute 02/07/2023 0.03  0.00 - 0.20  10*3/mm3 Final   • Immature Grans, Absolute 02/07/2023 0.05  0.00 - 0.05 10*3/mm3 Final   • nRBC 02/07/2023 0.0  0.0 - 0.2 /100 WBC Final   • WBC 02/08/2023 8.57  3.40 - 10.80 10*3/mm3 Final   • RBC 02/08/2023 3.34 (L)  4.14 - 5.80 10*6/mm3 Final   • Hemoglobin 02/08/2023 9.9 (L)  13.0 - 17.7 g/dL Final   • Hematocrit 02/08/2023 30.9 (L)  37.5 - 51.0 % Final   • MCV 02/08/2023 92.5  79.0 - 97.0 fL Final   • MCH 02/08/2023 29.6  26.6 - 33.0 pg Final   • MCHC 02/08/2023 32.0  31.5 - 35.7 g/dL Final   • RDW 02/08/2023 12.6  12.3 - 15.4 % Final   • RDW-SD 02/08/2023 42.6  37.0 - 54.0 fl Final   • MPV 02/08/2023 9.9  6.0 - 12.0 fL Final   • Platelets 02/08/2023 148  140 - 450 10*3/mm3 Final   • Iron 02/08/2023 40 (L)  59 - 158 mcg/dL Final   • Iron Saturation 02/08/2023 15 (L)  20 - 50 % Final   • Transferrin 02/08/2023 178 (L)  200 - 360 mg/dL Final   • TIBC 02/08/2023 265 (L)  298 - 536 mcg/dL Final   • Vitamin B-12 02/08/2023 551  211 - 946 pg/mL Final   • Folate 02/08/2023 >20.00  4.78 - 24.20 ng/mL Final   • Glucose 02/09/2023 91  65 - 99 mg/dL Final   • BUN 02/09/2023 25 (H)  8 - 23 mg/dL Final   • Creatinine 02/09/2023 0.98  0.76 - 1.27 mg/dL Final   • Sodium 02/09/2023 138  136 - 145 mmol/L Final   • Potassium 02/09/2023 4.5  3.5 - 5.2 mmol/L Final    Slight hemolysis detected by analyzer. Results may be affected.   • Chloride 02/09/2023 105  98 - 107 mmol/L Final   • CO2 02/09/2023 18.9 (L)  22.0 - 29.0 mmol/L Final   • Calcium 02/09/2023 7.8 (L)  8.6 - 10.5 mg/dL Final   • BUN/Creatinine Ratio 02/09/2023 25.5 (H)  7.0 - 25.0 Final   • Anion Gap 02/09/2023 14.1  5.0 - 15.0 mmol/L Final   • eGFR 02/09/2023 74.2  >60.0 mL/min/1.73 Final   • WBC 02/09/2023 8.28  3.40 - 10.80 10*3/mm3 Final   • RBC 02/09/2023 3.27 (L)  4.14 - 5.80 10*6/mm3 Final   • Hemoglobin 02/09/2023 10.1 (L)  13.0 - 17.7 g/dL Final   • Hematocrit 02/09/2023 31.7 (L)  37.5 - 51.0 % Final   • MCV 02/09/2023 96.9  79.0 - 97.0 fL Final   • MCH  02/09/2023 30.9  26.6 - 33.0 pg Final   • MCHC 02/09/2023 31.9  31.5 - 35.7 g/dL Final   • RDW 02/09/2023 12.4  12.3 - 15.4 % Final   • RDW-SD 02/09/2023 44.6  37.0 - 54.0 fl Final   • MPV 02/09/2023 10.3  6.0 - 12.0 fL Final   • Platelets 02/09/2023 116 (L)  140 - 450 10*3/mm3 Final   • Hemoglobin A1C 02/09/2023 5.60  4.80 - 5.60 % Final     XR HIP W OR WO PELVIS 2-3 VIEW RIGHT-    INDICATIONS: Trauma    TECHNIQUE: Frontal view the pelvis, 2 views of the right hip    COMPARISON: None available    FINDINGS:    Fracture of the right femoral neck is noted with proximal retraction  distal fracture fragment. No other fractures are identified. No  dislocation. Hip joint spaces appear preserved. Degenerative changes are  seen in the partly included lumbar spine. Arterial calcifications are  present.   Report Conclusions  IMPRESSION:     Right femoral neck fracture.    This report was finalized on 2/9/2023 1:29 PM by Dr. Eddie Jones M.D.     ASSESSMENT:  Right femoral neck fracture  Patient Active Problem List   Diagnosis   • Closed fracture of proximal end of right humerus, unspecified fracture morphology, initial encounter   • HTN (hypertension)   • HLD (hyperlipidemia)   • CAD (coronary artery disease)   • GERD (gastroesophageal reflux disease)   • Fracture of femoral neck, right (HCC)       PLAN:      Acute on chronic anemia with baseline iron deficiency compounded by acute fractures of both the proximal humerus and femoral neck. Hgb stable at 10.1      Given the nature of this type of fracture, was discussed with the patient that his pain and mobility would be best addressed by surgical intervention.  Risks and benefits of surgery were reviewed the patient did desire to proceed.  Discussed plans with nursing at the bedside this morning and will also see that the family is contacted and notified.    Current plans are for bipolar hemiarthroplasty of the right hip by Dr. Gee Lawler this afternoon.    Currently  n.p.o. and anticoagulation has been held in anticipation of surgery today.    Nonweightbearing of the right lower extremity, bedrest for now    The above diagnosis and treatment plan was discussed with the patient.  They were educated in treatment options for their condition.   They were given the opportunity to ask questions and were answered to their satisfaction.  They agreed to proceed with the above treatment plan.        Tomer Sepulveda, APRN  Date: 2/10/2023

## 2023-02-11 ENCOUNTER — APPOINTMENT (OUTPATIENT)
Dept: CT IMAGING | Facility: HOSPITAL | Age: 88
DRG: 522 | End: 2023-02-11
Payer: MEDICARE

## 2023-02-11 LAB
ALBUMIN SERPL-MCNC: 3.3 G/DL (ref 3.5–5.2)
ALBUMIN/GLOB SERPL: 1.3 G/DL
ALP SERPL-CCNC: 67 U/L (ref 39–117)
ALT SERPL W P-5'-P-CCNC: 8 U/L (ref 1–41)
ANION GAP SERPL CALCULATED.3IONS-SCNC: 12.9 MMOL/L (ref 5–15)
AST SERPL-CCNC: 22 U/L (ref 1–40)
BILIRUB SERPL-MCNC: 0.6 MG/DL (ref 0–1.2)
BUN SERPL-MCNC: 28 MG/DL (ref 8–23)
BUN/CREAT SERPL: 28.6 (ref 7–25)
CALCIUM SPEC-SCNC: 8 MG/DL (ref 8.6–10.5)
CHLORIDE SERPL-SCNC: 104 MMOL/L (ref 98–107)
CO2 SERPL-SCNC: 21.1 MMOL/L (ref 22–29)
CREAT SERPL-MCNC: 0.98 MG/DL (ref 0.76–1.27)
DEPRECATED RDW RBC AUTO: 41.7 FL (ref 37–54)
EGFRCR SERPLBLD CKD-EPI 2021: 74.2 ML/MIN/1.73
ERYTHROCYTE [DISTWIDTH] IN BLOOD BY AUTOMATED COUNT: 12.6 % (ref 12.3–15.4)
GLOBULIN UR ELPH-MCNC: 2.5 GM/DL
GLUCOSE SERPL-MCNC: 113 MG/DL (ref 65–99)
HCT VFR BLD AUTO: 28.4 % (ref 37.5–51)
HGB BLD-MCNC: 9.6 G/DL (ref 13–17.7)
MAGNESIUM SERPL-MCNC: 1.9 MG/DL (ref 1.6–2.4)
MCH RBC QN AUTO: 30.8 PG (ref 26.6–33)
MCHC RBC AUTO-ENTMCNC: 33.8 G/DL (ref 31.5–35.7)
MCV RBC AUTO: 91 FL (ref 79–97)
PLATELET # BLD AUTO: 152 10*3/MM3 (ref 140–450)
PMV BLD AUTO: 10.1 FL (ref 6–12)
POTASSIUM SERPL-SCNC: 4 MMOL/L (ref 3.5–5.2)
PROT SERPL-MCNC: 5.8 G/DL (ref 6–8.5)
RBC # BLD AUTO: 3.12 10*6/MM3 (ref 4.14–5.8)
SODIUM SERPL-SCNC: 138 MMOL/L (ref 136–145)
TSH SERPL DL<=0.05 MIU/L-ACNC: 2.29 UIU/ML (ref 0.27–4.2)
WBC NRBC COR # BLD: 9.92 10*3/MM3 (ref 3.4–10.8)

## 2023-02-11 PROCEDURE — 73200 CT UPPER EXTREMITY W/O DYE: CPT

## 2023-02-11 PROCEDURE — 25010000002 MORPHINE PER 10 MG: Performed by: ORTHOPAEDIC SURGERY

## 2023-02-11 PROCEDURE — 85027 COMPLETE CBC AUTOMATED: CPT | Performed by: HOSPITALIST

## 2023-02-11 PROCEDURE — 84443 ASSAY THYROID STIM HORMONE: CPT | Performed by: HOSPITALIST

## 2023-02-11 PROCEDURE — 80053 COMPREHEN METABOLIC PANEL: CPT | Performed by: HOSPITALIST

## 2023-02-11 PROCEDURE — 25010000002 HYDROMORPHONE PER 4 MG: Performed by: ORTHOPAEDIC SURGERY

## 2023-02-11 PROCEDURE — 25010000002 CEFAZOLIN IN DEXTROSE 2-4 GM/100ML-% SOLUTION: Performed by: ORTHOPAEDIC SURGERY

## 2023-02-11 PROCEDURE — 83735 ASSAY OF MAGNESIUM: CPT | Performed by: HOSPITALIST

## 2023-02-11 RX ORDER — LOSARTAN POTASSIUM 25 MG/1
25 TABLET ORAL
Status: DISCONTINUED | OUTPATIENT
Start: 2023-02-11 | End: 2023-02-16 | Stop reason: HOSPADM

## 2023-02-11 RX ORDER — HYDROMORPHONE HYDROCHLORIDE 1 MG/ML
0.5 INJECTION, SOLUTION INTRAMUSCULAR; INTRAVENOUS; SUBCUTANEOUS ONCE
Status: COMPLETED | OUTPATIENT
Start: 2023-02-11 | End: 2023-02-11

## 2023-02-11 RX ADMIN — HYDROCODONE BITARTRATE AND ACETAMINOPHEN 1 TABLET: 5; 325 TABLET ORAL at 12:29

## 2023-02-11 RX ADMIN — MEMANTINE HYDROCHLORIDE 10 MG: 10 TABLET, FILM COATED ORAL at 20:23

## 2023-02-11 RX ADMIN — CARVEDILOL 12.5 MG: 12.5 TABLET, FILM COATED ORAL at 17:36

## 2023-02-11 RX ADMIN — HYDROMORPHONE HYDROCHLORIDE 0.5 MG: 1 INJECTION, SOLUTION INTRAMUSCULAR; INTRAVENOUS; SUBCUTANEOUS at 13:13

## 2023-02-11 RX ADMIN — CARVEDILOL 12.5 MG: 12.5 TABLET, FILM COATED ORAL at 08:22

## 2023-02-11 RX ADMIN — HYDROCODONE BITARTRATE AND ACETAMINOPHEN 1 TABLET: 5; 325 TABLET ORAL at 08:12

## 2023-02-11 RX ADMIN — LEVOTHYROXINE SODIUM 25 MCG: 0.03 TABLET ORAL at 08:22

## 2023-02-11 RX ADMIN — Medication 10 ML: at 20:24

## 2023-02-11 RX ADMIN — FINASTERIDE 5 MG: 5 TABLET, FILM COATED ORAL at 08:22

## 2023-02-11 RX ADMIN — DOCUSATE SODIUM 200 MG: 100 CAPSULE, LIQUID FILLED ORAL at 20:33

## 2023-02-11 RX ADMIN — MORPHINE SULFATE 2 MG: 2 INJECTION, SOLUTION INTRAMUSCULAR; INTRAVENOUS at 18:47

## 2023-02-11 RX ADMIN — CEFAZOLIN SODIUM 2 G: 2 INJECTION, SOLUTION INTRAVENOUS at 05:01

## 2023-02-11 RX ADMIN — DOCUSATE SODIUM 200 MG: 100 CAPSULE, LIQUID FILLED ORAL at 08:22

## 2023-02-11 RX ADMIN — DOCUSATE SODIUM 50MG AND SENNOSIDES 8.6MG 2 TABLET: 8.6; 5 TABLET, FILM COATED ORAL at 08:22

## 2023-02-11 RX ADMIN — SODIUM CHLORIDE, POTASSIUM CHLORIDE, SODIUM LACTATE AND CALCIUM CHLORIDE 100 ML/HR: 600; 310; 30; 20 INJECTION, SOLUTION INTRAVENOUS at 08:17

## 2023-02-11 RX ADMIN — Medication 1000 UNITS: at 08:22

## 2023-02-11 RX ADMIN — APIXABAN 2.5 MG: 2.5 TABLET, FILM COATED ORAL at 08:22

## 2023-02-11 RX ADMIN — MEMANTINE HYDROCHLORIDE 10 MG: 10 TABLET, FILM COATED ORAL at 08:12

## 2023-02-11 RX ADMIN — TAMSULOSIN HYDROCHLORIDE 0.4 MG: 0.4 CAPSULE ORAL at 20:23

## 2023-02-11 RX ADMIN — DOCUSATE SODIUM 50MG AND SENNOSIDES 8.6MG 2 TABLET: 8.6; 5 TABLET, FILM COATED ORAL at 20:33

## 2023-02-11 RX ADMIN — HYDROCODONE BITARTRATE AND ACETAMINOPHEN 1 TABLET: 5; 325 TABLET ORAL at 17:35

## 2023-02-11 RX ADMIN — ATORVASTATIN CALCIUM 20 MG: 20 TABLET, FILM COATED ORAL at 08:22

## 2023-02-11 RX ADMIN — Medication 6 MG: at 20:23

## 2023-02-11 RX ADMIN — Medication 10 ML: at 08:22

## 2023-02-11 RX ADMIN — MORPHINE SULFATE 2 MG: 2 INJECTION, SOLUTION INTRAMUSCULAR; INTRAVENOUS at 09:15

## 2023-02-11 RX ADMIN — APIXABAN 2.5 MG: 2.5 TABLET, FILM COATED ORAL at 20:23

## 2023-02-11 RX ADMIN — LOSARTAN POTASSIUM 25 MG: 25 TABLET, FILM COATED ORAL at 12:29

## 2023-02-11 NOTE — PROGRESS NOTES
Parveen Soriano MD     Orthopedic Progress Note    Subjective :   Patient was sleeping comfortably in bed this morning.  Pain is well controlled.  He is in a sling.  CT scan of the right elbow still pending has not mobilized yet since surgery    Objective :    Vital signs in last 24 hours:  Temp:  [96.8 °F (36 °C)-99 °F (37.2 °C)] 97.5 °F (36.4 °C)  Heart Rate:  [65-98] 70  Resp:  [16-18] 16  BP: (125-177)/() 137/58  Vitals:    02/10/23 2149 02/11/23 0212 02/11/23 0608 02/11/23 0940   BP: 171/67 138/61 177/66 137/58   BP Location: Left arm Left arm Left arm Left arm   Patient Position: Lying Lying Lying Lying   Pulse: 68 67 70 70   Resp: 16 16 16 16   Temp: 97.7 °F (36.5 °C) 98.3 °F (36.8 °C) 97.9 °F (36.6 °C) 97.5 °F (36.4 °C)   TempSrc: Oral Oral Oral Oral   SpO2: 92% 96% 94% 91%   Weight:       Height:           PHYSICAL EXAM:  Patient is calm, in no acute distress, awake and oriented x 3.  Dressing clean, dry and intact.  No signs of infection.  Swelling is appropriate.  Ecchymosis is appropriate in amount.  Homans test is negative.  Patient is neurovascularly intact distally.  Some bruising and ecchymosis to the right thigh.  Sling is in place to the right upper extremity.  Wrist flexion extension is intact.  EPL FPL function is intact.  Sensation is intact light touch radial median ulnar nerve    LABS:  Results from last 7 days   Lab Units 02/11/23  0704   WBC 10*3/mm3 9.92   HEMOGLOBIN g/dL 9.6*   HEMATOCRIT % 28.4*   PLATELETS 10*3/mm3 152     Results from last 7 days   Lab Units 02/11/23  0704   SODIUM mmol/L 138   POTASSIUM mmol/L 4.0   CHLORIDE mmol/L 104   CO2 mmol/L 21.1*   BUN mg/dL 28*   CREATININE mg/dL 0.98   GLUCOSE mg/dL 113*   CALCIUM mg/dL 8.0*           Intake/Output                             02/09/23 0701 - 02/10/23 0700 02/10/23 0701 - 02/11/23 0700 02/11/23 0701 - 02/12/23 0700     5090-6629 4904-8939 Total 5162-7744 1571-8680 Total 6351-3100 7502-5833 Total                     Intake    P.O.  --  -- --  --  320 320  60  -- 60    I.V.  --  -- --  1100  1000 2100  --  -- --    Total Intake -- -- -- 1100 1320 2420 60 -- 60       Output    Urine  75  -- 75  --  500 500  --  -- --    Total Output 75 -- 75 -- 500 500 -- -- --           ASSESSMENT:  Status post right hip hemiarthroplasty posterior approach    Plan:  Continue Physical Therapy, weightbearing as tolerated to right lower extremity with posterior hip dislocation precautions    Maintain nonweightbearing status to the right upper extremity in sling for now.  CT scan is still pending of the right elbow  .  Continue SCDs, Continue Eliquis 2.5 mg      Parveen Soriano MD    Date: 2/11/2023  Time: 10:50 EST    Parveen Soriano MD  Orthopaedic Surgeon  [facility]

## 2023-02-11 NOTE — PROGRESS NOTES
Name: Cara Garvey ADMIT: 2023   : 1934  PCP: Princess Yi MD    MRN: [id number] LOS: 2 days   AGE/SEX: 88 y.o. male  ROOM: [facility]     Subjective   Subjective   C/o pain in right hip and right elbow. No pain in shoulder at present. No SOA. No CP or palp. No N/V. No diaphoresis.  Pt is confused due to dementia. He is not aware that he has fractures of right upper and lower extremities.    Review of Systems     as above    Objective   Objective   Vital Signs  Temp:  [96.8 °F (36 °C)-99 °F (37.2 °C)] 97.9 °F (36.6 °C)  Heart Rate:  [65-98] 70  Resp:  [16-18] 16  BP: (125-177)/() 177/66  SpO2:  [91 %-100 %] 94 %  on  Flow (L/min):  [2-6] 2;   Device (Oxygen Therapy): room air  Body mass index is 27.49 kg/m².  Physical Exam  Vitals and nursing note reviewed.   Constitutional:       General: He is not in acute distress.     Appearance: He is ill-appearing (chronically). He is not toxic-appearing or diaphoretic.   HENT:      Head: Normocephalic.      Nose: Nose normal.      Mouth/Throat:      Mouth: Mucous membranes are moist.      Pharynx: Oropharynx is clear.      Comments: Edentulous  Eyes:      General: No scleral icterus.        Right eye: No discharge.         Left eye: No discharge.      Extraocular Movements: Extraocular movements intact.      Conjunctiva/sclera: Conjunctivae normal.   Cardiovascular:      Rate and Rhythm: Normal rate and regular rhythm.      Pulses: Normal pulses.   Pulmonary:      Effort: Pulmonary effort is normal. No respiratory distress.      Breath sounds: Normal breath sounds. No wheezing or rales.   Abdominal:      General: Bowel sounds are normal. There is no distension.      Palpations: Abdomen is soft.      Tenderness: There is no abdominal tenderness.   Musculoskeletal:         General: No swelling or deformity.      Cervical back: Neck supple. No rigidity.      Comments: RUE in sling, NVI in distal RUE  NVI in distal RLE   Lymphadenopathy:       Cervical: No cervical adenopathy.   Skin:     General: Skin is warm and dry.      Capillary Refill: Capillary refill takes less than 2 seconds.      Coloration: Skin is not jaundiced.      Findings: No rash.   Neurological:      Mental Status: He is alert. Mental status is at baseline.      Cranial Nerves: No cranial nerve deficit.      Coordination: Coordination normal.      Comments: Oriented to person   Psychiatric:         Mood and Affect: Mood normal.         Behavior: Behavior normal.      Comments: Pleasant       Results Review     I reviewed the patient's new clinical results.  Results from last 7 days   Lab Units 02/11/23  0704 02/10/23  0738 02/09/23  0652 02/08/23  1123   WBC 10*3/mm3 9.92 8.05 8.28 8.57   HEMOGLOBIN g/dL 9.6* 10.5* 10.1* 9.9*   PLATELETS 10*3/mm3 152 124* 116* 148     Results from last 7 days   Lab Units 02/11/23  0704 02/10/23  0738 02/09/23  0652 02/07/23  1516   SODIUM mmol/L 138 139 138 137   POTASSIUM mmol/L 4.0 3.7 4.5 3.8   CHLORIDE mmol/L 104 104 105 104   CO2 mmol/L 21.1* 21.1* 18.9* 27.0   BUN mg/dL 28* 24* 25* 20   CREATININE mg/dL 0.98 0.95 0.98 1.02   GLUCOSE mg/dL 113* 101* 91 137*   EGFR mL/min/1.73 74.2 77.0 74.2 70.7     Results from last 7 days   Lab Units 02/11/23  0704 02/10/23  0738   ALBUMIN g/dL 3.3* 3.0*   BILIRUBIN mg/dL 0.6  --    ALK PHOS U/L 67  --    AST (SGOT) U/L 22  --    ALT (SGPT) U/L 8  --      Results from last 7 days   Lab Units 02/11/23  0704 02/10/23  0738 02/09/23  0652 02/07/23  1516   CALCIUM mg/dL 8.0* 7.8* 7.8* 8.5*   ALBUMIN g/dL 3.3* 3.0*  --   --    MAGNESIUM mg/dL 1.9  --   --   --        Hemoglobin A1C   Date/Time Value Ref Range Status   02/09/2023 0652 5.60 4.80 - 5.60 % Final       XR Elbow 2 View Right    Result Date: 2/9/2023   As described.  This report was finalized on 2/9/2023 1:30 PM by Dr. Eddie Jones M.D.      XR Hip 1 View Without Pelvis Right (Surgery Only)    Result Date: 2/10/2023   Postsurgical changes.    This  report was finalized on 2/10/2023 5:33 PM by Dr. Eddie Jones M.D.      XR Hip With or Without Pelvis 2 - 3 View Right    Result Date: 2/9/2023   Right femoral neck fracture.  This report was finalized on 2/9/2023 1:29 PM by Dr. Eddie Jones M.D.      I have personally reviewed all medications:  Scheduled Medications  apixaban, 2.5 mg, Oral, Q12H  atorvastatin, 20 mg, Oral, Daily  carvedilol, 12.5 mg, Oral, BID With Meals  cholecalciferol, 1,000 Units, Oral, Daily  docusate sodium, 200 mg, Oral, BID  finasteride, 5 mg, Oral, Daily  levothyroxine, 25 mcg, Oral, Q AM  melatonin, 6 mg, Oral, Nightly  memantine, 10 mg, Oral, Q12H  OLANZapine, 5 mg, Intramuscular, Once  senna-docusate sodium, 2 tablet, Oral, BID  sodium chloride, 10 mL, Intravenous, Q12H  tamsulosin, 0.4 mg, Oral, Nightly  vitamin D, 50,000 Units, Oral, Q7 Days    Infusions  lactated ringers, 100 mL/hr, Last Rate: 100 mL/hr (02/11/23 0817)    Diet  Diet: Regular/House Diet; Texture: Regular Texture (IDDSI 7); Fluid Consistency: Thin (IDDSI 0)    I have personally reviewed:  [x]  Laboratory   []  Microbiology   []  Radiology   []  EKG/Telemetry  []  Cardiology/Vascular   []  Pathology    []  Records       Assessment/Plan     Active Hospital Problems    Diagnosis  POA   • **Fracture of femoral neck, right (HCC) [S72.001A]  Yes   • Moderate dementia without behavioral disturbance [F03.B0]  Yes   • Vitamin D deficiency [E55.9]  Yes   • CAD (coronary artery disease) [I25.10]  Yes   • GERD (gastroesophageal reflux disease) [K21.9]  Yes   • Closed fracture of proximal end of right humerus, unspecified fracture morphology, initial encounter [S42.201A]  Yes   • HTN (hypertension) [I10]  Yes   • HLD (hyperlipidemia) [E78.5]  Yes      Resolved Hospital Problems   No resolved problems to display.       Mr. Garvey is a 88 y.o. non-smoker with a history of CAD s/p stent to LAD, HTN, HLD, and GERD, who presented to [facility] secondary  to shoulder pain after unwitnessed mechanical fall. Later in admission was found to have right hip fracture as well.     Right proximal humerus fracture. Orthopedic Surgery evaluated, recommend conservative management, no surgery required. Continue to utilize sling, can remove sling for hygiene as well as regular elbow motion as tolerated. Follow-up with Orthopedic Surgery in 2-3 weeks as outpt.     Right femoral neck fracture.  Patient complaied of acute right hip pain after admission, x-ray showed right femoral neck fracture. Orthopedic Surgery consulted. Pt underwent bipolar arthroplasty and abductor tendon repair 2/10 by Dr. Lawler. BID Eliquis ordered by Ortho for DVT ppx.     Hypocalcemia.  Calcium 8.0 this morning. Ionized calcium is wnl. Vitamin D only 21.4. Have started oral ergocalciferol.     Dementia: Patient oriented to name. Continue home Namenda. Patient resides in memory care unit.     Essential hypertension: BP robust. Continue home carvedilol. Restart Losartan today.     Hypothyroidism: Continue levothyroxine. TSH wnl.     Anemia of chronic disease: Iron studies consistent anemia of chronic disease. B12 and folate normal. Hgb stable. Monitor postop.    CAD: no CP or SOA. Continue BB and statin. Not on antiplatelet prior to admission.       · Eliquis BID for DVT prophylaxis per Ortho.  · Full code.  · Discussed with patient and RN.  · Anticipate discharge to SNU facility, timing yet to be determined.      Krishna Moon MD  [facility]  02/11/23  08:39 EST

## 2023-02-11 NOTE — SIGNIFICANT NOTE
02/11/23 1004   OTHER   Discipline physical therapist   Rehab Time/Intention   Session Not Performed other (see comments)  (hold per nsg; pt recently settled and pain controlled; will attempt mobilization tmw)   Therapy Assessment/Plan (PT)   Criteria for Skilled Interventions Met (PT) yes   Recommendation   PT - Next Appointment 02/12/23

## 2023-02-11 NOTE — PLAN OF CARE
Goal Outcome Evaluation:      Pt in a lot of pain this morning, received one dose of pain meds overnight, see MAR, gave pain meds this morning, see MAR, pt has been comfortable resting and not crying out. Suggest continuing pain meds Q4 since pt is tolerating well, spoke to Ortho and per Dr. Soriano pt still needs CT and ok to pre-med before scan, see MAR, Alert and oriented to self, disoriented, time, situation, place. Abductor pillow in place, right arm in sling, VSS.

## 2023-02-12 PROBLEM — S42.464A: Status: ACTIVE | Noted: 2023-02-12

## 2023-02-12 LAB
ALBUMIN SERPL-MCNC: 3 G/DL (ref 3.5–5.2)
ALBUMIN/GLOB SERPL: 1.5 G/DL
ALP SERPL-CCNC: 62 U/L (ref 39–117)
ALT SERPL W P-5'-P-CCNC: 7 U/L (ref 1–41)
ANION GAP SERPL CALCULATED.3IONS-SCNC: 9 MMOL/L (ref 5–15)
AST SERPL-CCNC: 22 U/L (ref 1–40)
BILIRUB SERPL-MCNC: 0.7 MG/DL (ref 0–1.2)
BUN SERPL-MCNC: 32 MG/DL (ref 8–23)
BUN/CREAT SERPL: 31.4 (ref 7–25)
CALCIUM SPEC-SCNC: 7.9 MG/DL (ref 8.6–10.5)
CHLORIDE SERPL-SCNC: 106 MMOL/L (ref 98–107)
CO2 SERPL-SCNC: 23 MMOL/L (ref 22–29)
CREAT SERPL-MCNC: 1.02 MG/DL (ref 0.76–1.27)
DEPRECATED RDW RBC AUTO: 41.2 FL (ref 37–54)
EGFRCR SERPLBLD CKD-EPI 2021: 70.7 ML/MIN/1.73
ERYTHROCYTE [DISTWIDTH] IN BLOOD BY AUTOMATED COUNT: 12.4 % (ref 12.3–15.4)
GLOBULIN UR ELPH-MCNC: 2 GM/DL
GLUCOSE SERPL-MCNC: 103 MG/DL (ref 65–99)
HCT VFR BLD AUTO: 24.9 % (ref 37.5–51)
HGB BLD-MCNC: 8.5 G/DL (ref 13–17.7)
MAGNESIUM SERPL-MCNC: 2.1 MG/DL (ref 1.6–2.4)
MCH RBC QN AUTO: 30.9 PG (ref 26.6–33)
MCHC RBC AUTO-ENTMCNC: 34.1 G/DL (ref 31.5–35.7)
MCV RBC AUTO: 90.5 FL (ref 79–97)
PLATELET # BLD AUTO: 153 10*3/MM3 (ref 140–450)
PMV BLD AUTO: 10 FL (ref 6–12)
POTASSIUM SERPL-SCNC: 3.8 MMOL/L (ref 3.5–5.2)
PROT SERPL-MCNC: 5 G/DL (ref 6–8.5)
RBC # BLD AUTO: 2.75 10*6/MM3 (ref 4.14–5.8)
SODIUM SERPL-SCNC: 138 MMOL/L (ref 136–145)
WBC NRBC COR # BLD: 6.92 10*3/MM3 (ref 3.4–10.8)

## 2023-02-12 PROCEDURE — 80053 COMPREHEN METABOLIC PANEL: CPT | Performed by: HOSPITALIST

## 2023-02-12 PROCEDURE — 97530 THERAPEUTIC ACTIVITIES: CPT

## 2023-02-12 PROCEDURE — 83735 ASSAY OF MAGNESIUM: CPT | Performed by: HOSPITALIST

## 2023-02-12 PROCEDURE — 25010000002 MORPHINE PER 10 MG: Performed by: ORTHOPAEDIC SURGERY

## 2023-02-12 PROCEDURE — 85027 COMPLETE CBC AUTOMATED: CPT | Performed by: HOSPITALIST

## 2023-02-12 PROCEDURE — 97164 PT RE-EVAL EST PLAN CARE: CPT

## 2023-02-12 RX ADMIN — Medication 10 ML: at 08:17

## 2023-02-12 RX ADMIN — HYDROCODONE BITARTRATE AND ACETAMINOPHEN 1 TABLET: 5; 325 TABLET ORAL at 13:29

## 2023-02-12 RX ADMIN — DOCUSATE SODIUM 50MG AND SENNOSIDES 8.6MG 2 TABLET: 8.6; 5 TABLET, FILM COATED ORAL at 20:24

## 2023-02-12 RX ADMIN — CARVEDILOL 12.5 MG: 12.5 TABLET, FILM COATED ORAL at 17:22

## 2023-02-12 RX ADMIN — MORPHINE SULFATE 2 MG: 2 INJECTION, SOLUTION INTRAMUSCULAR; INTRAVENOUS at 11:25

## 2023-02-12 RX ADMIN — DOCUSATE SODIUM 200 MG: 100 CAPSULE, LIQUID FILLED ORAL at 08:15

## 2023-02-12 RX ADMIN — Medication 1000 UNITS: at 08:14

## 2023-02-12 RX ADMIN — TAMSULOSIN HYDROCHLORIDE 0.4 MG: 0.4 CAPSULE ORAL at 20:24

## 2023-02-12 RX ADMIN — APIXABAN 2.5 MG: 2.5 TABLET, FILM COATED ORAL at 08:15

## 2023-02-12 RX ADMIN — HYDROCODONE BITARTRATE AND ACETAMINOPHEN 1 TABLET: 5; 325 TABLET ORAL at 20:22

## 2023-02-12 RX ADMIN — LOSARTAN POTASSIUM 25 MG: 25 TABLET, FILM COATED ORAL at 08:14

## 2023-02-12 RX ADMIN — FINASTERIDE 5 MG: 5 TABLET, FILM COATED ORAL at 08:17

## 2023-02-12 RX ADMIN — CARVEDILOL 12.5 MG: 12.5 TABLET, FILM COATED ORAL at 08:15

## 2023-02-12 RX ADMIN — MORPHINE SULFATE 2 MG: 2 INJECTION, SOLUTION INTRAMUSCULAR; INTRAVENOUS at 04:30

## 2023-02-12 RX ADMIN — MEMANTINE HYDROCHLORIDE 10 MG: 10 TABLET, FILM COATED ORAL at 20:23

## 2023-02-12 RX ADMIN — HYDROCODONE BITARTRATE AND ACETAMINOPHEN 1 TABLET: 5; 325 TABLET ORAL at 01:30

## 2023-02-12 RX ADMIN — DOCUSATE SODIUM 50MG AND SENNOSIDES 8.6MG 2 TABLET: 8.6; 5 TABLET, FILM COATED ORAL at 08:14

## 2023-02-12 RX ADMIN — APIXABAN 2.5 MG: 2.5 TABLET, FILM COATED ORAL at 20:22

## 2023-02-12 RX ADMIN — HYDROCODONE BITARTRATE AND ACETAMINOPHEN 1 TABLET: 5; 325 TABLET ORAL at 08:14

## 2023-02-12 RX ADMIN — MORPHINE SULFATE 2 MG: 2 INJECTION, SOLUTION INTRAMUSCULAR; INTRAVENOUS at 16:41

## 2023-02-12 RX ADMIN — LEVOTHYROXINE SODIUM 25 MCG: 0.03 TABLET ORAL at 08:15

## 2023-02-12 RX ADMIN — MEMANTINE HYDROCHLORIDE 10 MG: 10 TABLET, FILM COATED ORAL at 08:14

## 2023-02-12 RX ADMIN — MORPHINE SULFATE 2 MG: 2 INJECTION, SOLUTION INTRAMUSCULAR; INTRAVENOUS at 00:04

## 2023-02-12 RX ADMIN — DOCUSATE SODIUM 200 MG: 100 CAPSULE, LIQUID FILLED ORAL at 20:21

## 2023-02-12 RX ADMIN — Medication 6 MG: at 20:22

## 2023-02-12 RX ADMIN — ATORVASTATIN CALCIUM 20 MG: 20 TABLET, FILM COATED ORAL at 08:15

## 2023-02-12 NOTE — PLAN OF CARE
Problem: Fall Injury Risk  Goal: Absence of Fall and Fall-Related Injury  Outcome: Ongoing, Progressing  Intervention: Identify and Manage Contributors  Recent Flowsheet Documentation  Taken 2/12/2023 0730 by Alison Peres RN  Medication Review/Management: medications reviewed  Intervention: Promote Injury-Free Environment  Recent Flowsheet Documentation  Taken 2/12/2023 0730 by Alison Peres RN  Safety Promotion/Fall Prevention:   room organization consistent   safety round/check completed   toileting scheduled     Problem: Bleeding (Orthopaedic Fracture)  Goal: Absence of Bleeding  Outcome: Ongoing, Progressing     Problem: Embolism (Orthopaedic Fracture)  Goal: Absence of Embolism Signs and Symptoms  Outcome: Ongoing, Progressing     Problem: Fracture Stabilization and Management (Orthopaedic Fracture)  Goal: Fracture Stability  Outcome: Ongoing, Progressing     Problem: Functional Ability Impaired (Orthopaedic Fracture)  Goal: Optimal Functional Ability  Outcome: Ongoing, Progressing  Intervention: Optimize Functional Ability  Recent Flowsheet Documentation  Taken 2/12/2023 0730 by Alison Peres RN  Activity Management: activity adjusted per tolerance  Activity Assistance Provided: assistance, 2 people  Range of Motion: active ROM (range of motion) encouraged     Problem: Infection (Orthopaedic Fracture)  Goal: Absence of Infection Signs and Symptoms  Outcome: Ongoing, Progressing  Intervention: Prevent or Manage Infection  Recent Flowsheet Documentation  Taken 2/12/2023 0730 by Alison Peres RN  Infection Prevention:   rest/sleep promoted   hand hygiene promoted     Problem: Neurovascular Compromise (Orthopaedic Fracture)  Goal: Effective Tissue Perfusion  Outcome: Ongoing, Progressing     Problem: Pain (Orthopaedic Fracture)  Goal: Acceptable Pain Control  Outcome: Ongoing, Progressing     Problem: Respiratory Compromise (Orthopaedic Fracture)  Goal: Effective Oxygenation and  Ventilation  Outcome: Ongoing, Progressing     Problem: Pain Acute  Goal: Acceptable Pain Control and Functional Ability  Outcome: Ongoing, Progressing  Intervention: Prevent or Manage Pain  Recent Flowsheet Documentation  Taken 2/12/2023 0730 by Alison Peres RN  Medication Review/Management: medications reviewed     Problem: Mobility Impairment  Goal: Optimal Mobility  Outcome: Ongoing, Progressing  Intervention: Optimize Mobility  Recent Flowsheet Documentation  Taken 2/12/2023 0730 by Alison Peres RN  Activity Management: activity adjusted per tolerance     Problem: Skin Injury Risk Increased  Goal: Skin Health and Integrity  Outcome: Ongoing, Progressing     Problem: Adult Inpatient Plan of Care  Goal: Plan of Care Review  Outcome: Ongoing, Progressing  Goal: Plan of Care Review  Outcome: Ongoing, Progressing   Goal Outcome Evaluation:   Managing pain with PRN pain medications, Hx Dementia, pt somewhat agitated today during turns and repositioning.

## 2023-02-12 NOTE — THERAPY PROGRESS REPORT/RE-CERT
Patient Name: Cara Garvey  : 1934    MRN: [id number]                              Today's Date: 2023       Admit Date: 2023    Visit Dx:     ICD-10-CM ICD-9-CM   1. Closed fracture of proximal end of right humerus, unspecified fracture morphology, initial encounter  S42.201A 812.00   2. History of dementia  Z86.59 V11.8   3. Compression fracture of thoracic vertebra, unspecified thoracic vertebral level, initial encounter ([facility])  S22.000A 805.2     Patient Active Problem List   Diagnosis   • Closed fracture of proximal end of right humerus, unspecified fracture morphology, initial encounter   • HTN (hypertension)   • HLD (hyperlipidemia)   • CAD (coronary artery disease)   • GERD (gastroesophageal reflux disease)   • Fracture of femoral neck, right ([facility])   • Moderate dementia without behavioral disturbance   • Vitamin D deficiency   • Closed nondisplaced fracture of medial condyle of right humerus     History reviewed. No pertinent past medical history.  History reviewed. No pertinent surgical history.   General Information     Row Name 23 1109          Physical Therapy Time and Intention    Document Type progress note/recertification  -RS     Mode of Treatment physical therapy  -RS     Row Name 23 1109          General Information    Patient Profile Reviewed yes  -RS     Existing Precautions/Restrictions fall;cervical collar  -RS           User Key  (r) = Recorded By, (t) = Taken By, (c) = Cosigned By    Initials Name Provider Type    RS Sia Pierce PT Physical Therapist               Mobility     Row Name 23 1210          Bed Mobility    Bed Mobility sit-supine;supine-sit  -RS     Supine-Sit [place] (Bed Mobility) maximum assist (25% patient effort);2 person assist  -RS     Sit-Supine [place] (Bed Mobility) maximum assist (25% patient effort);2 person assist  -RS     Assistive Device (Bed Mobility) bed rails;head of bed elevated  -RS     Comment, (Bed Mobility)  pt sat EOB x 5 min, reporting pain throughout, able to perform AP, not able to perform LAQ  -RS     Row Name 02/12/23 1210          Sit-Stand Transfer    Comment, (Sit-Stand Transfer) not attempted- pt unsafe to perform based on current functional mobility  -RS     Row Name 02/12/23 1210          Mobility    Extremity Weight-bearing Status right upper extremity;right lower extremity  -RS     Right Upper Extremity (Weight-bearing Status) --  RUE NWB, R post hip precautions  -RS           User Key  (r) = Recorded By, (t) = Taken By, (c) = Cosigned By    Initials Name Provider Type    RS Sia Pierce PT Physical Therapist               Obj/Interventions     Row Name 02/12/23 1212          Range of Motion Comprehensive    Comment, General Range of Motion R UE in sling, R hip limited by post op pain and precautions  -RS     Row Name 02/12/23 1212          Balance    Balance Assessment sitting static balance;sitting dynamic balance  -RS     Static Sitting Balance contact guard  -RS     Dynamic Sitting Balance moderate assist  -RS           User Key  (r) = Recorded By, (t) = Taken By, (c) = Cosigned By    Initials Name Provider Type    RS Sia Pierce PT Physical Therapist               Goals/Plan     Row Name 02/12/23 1215          Bed Mobility Goal 1 (PT)    Activity/Assistive Device (Bed Mobility Goal 1, PT) bed mobility activities, all  -RS     [place] Level/Cues Needed (Bed Mobility Goal 1, PT) moderate assist (50-74% patient effort)  -RS     Time Frame (Bed Mobility Goal 1, PT) short term goal (STG);1 week  -RS     Progress/Outcomes (Bed Mobility Goal 1, PT) goal not met;unable to make needed progress  -RS     Row Name 02/12/23 1215          Transfer Goal 1 (PT)    Activity/Assistive Device (Transfer Goal 1, PT) transfers, all  -RS     [place] Level/Cues Needed (Transfer Goal 1, PT) moderate assist (50-74% patient effort);maximum assist (25-49% patient effort)  -RS     Time Frame (Transfer Goal  1, PT) short term goal (STG);1 week  -RS     Progress/Outcome (Transfer Goal 1, PT) goal not met  -RS           User Key  (r) = Recorded By, (t) = Taken By, (c) = Cosigned By    Initials Name Provider Type    Sia Diallo PT Physical Therapist               Clinical Impression     Row Name 02/12/23 1212          Pain    Pre/Posttreatment Pain Comment pt does not rate pain, he yells out in pain during transfers  -RS     Pain Intervention(s) Repositioned;Cold pack  -RS     Row Name 02/12/23 1212          Plan of Care Review    Plan of Care Reviewed With patient  -RS     Progress no change  -RS     Outcome Evaluation Pt is POD2 s/p R posterior hip and R UE in sling/NWB. He is agreeable to participate with PT this date although reports pain throughout. He performed supine <> sit with maxAx2 with frequently crying out in pain during transition. Attempted LAQ however pt unable, he does perform AP whie seated. RUE remain in sling and soft collar donned throoughout. Pt remains appropriate for skilled care and will likely DC to SNU.  -RS     Row Name 02/12/23 1212          Therapy Assessment/Plan (PT)    Criteria for Skilled Interventions Met (PT) yes  -RS     Therapy Frequency (PT) 5 times/wk  -RS     Row Name 02/12/23 1212          Positioning and Restraints    Pre-Treatment Position in bed  -RS     Post Treatment Position bed  -RS     In Bed supine;call light within reach;encouraged to call for assist;exit alarm on  -RS           User Key  (r) = Recorded By, (t) = Taken By, (c) = Cosigned By    Initials Name Provider Type    Sia Diallo PT Physical Therapist               Outcome Measures     Row Name 02/12/23 1216 02/12/23 0730       How much help from another person do you currently need...    Turning from your back to your side while in flat bed without using bedrails? 1  -RS 1  -LR    Moving from lying on back to sitting on the side of a flat bed without bedrails? 1  -RS 1  -LR    Moving to and from  a bed to a chair (including a wheelchair)? 1  -RS 1  -LR    Standing up from a chair using your arms (e.g., wheelchair, bedside chair)? 1  -RS 1  -LR    Climbing 3-5 steps with a railing? 1  -RS 1  -LR    To walk in hospital room? 1  -RS 1  -LR    AM-PAC 6 Clicks Score (PT) 6  -RS 6  -LR    Highest level of mobility 2 --> Bed activities/dependent transfer  -RS 2 --> Bed activities/dependent transfer  -LR    Row Name 02/12/23 1216          Functional Assessment    Outcome Measure Options AM-PAC 6 Clicks Basic Mobility (PT)  -RS           User Key  (r) = Recorded By, (t) = Taken By, (c) = Cosigned By    Initials Name Provider Type    RS Sia Pierce, PT Physical Therapist    LR Alison Peres, RN Registered Nurse                             Physical Therapy Education     Title: PT OT SLP Therapies (In Progress)     Topic: Physical Therapy (In Progress)     Point: Mobility training (In Progress)     Learning Progress Summary           Patient Acceptance, E, NR by  at 2/12/2023 1216    Acceptance, E,TB, VU by MB at 2/9/2023 1113                   Point: Home exercise program (In Progress)     Learning Progress Summary           Patient Acceptance, E, NR by  at 2/12/2023 1216                   Point: Body mechanics (In Progress)     Learning Progress Summary           Patient Acceptance, E, NR by  at 2/12/2023 1216                   Point: Precautions (In Progress)     Learning Progress Summary           Patient Acceptance, E, NR by  at 2/12/2023 1216                               User Key     Initials Effective Dates Name Provider Type Discipline     06/16/21 -  Sia Pierce, PT Physical Therapist PT    MB 12/30/22 -  Aamir Gresham, HEATHER Student PT Student PT              PT Recommendation and Plan     Plan of Care Reviewed With: patient  Progress: no change  Outcome Evaluation: Pt is POD2 s/p R posterior hip and R UE in sling/NWB. He is agreeable to participate with PT this date although reports  pain throughout. He performed supine <> sit with maxAx2 with frequently crying out in pain during transition. Attempted LAQ however pt unable, he does perform AP whie seated. RUE remain in sling and soft collar donned throoughout. Pt remains appropriate for skilled care and will likely DC to SNU.     Time Calculation:    PT Charges     Row Name 02/12/23 1217             Time Calculation    Start Time 1048  -RS      Stop Time 1105  -RS      Time Calculation (min) 17 min  -RS      PT Received On 02/12/23  -RS      PT - Next Appointment 02/13/23  -RS      PT Goal Re-Cert Due Date 02/19/23  -RS         Time Calculation- PT    Total Timed Code Minutes- PT 10 minute(s)  -RS         Timed Charges    17905 - PT Therapeutic Activity Minutes 10  -RS         Untimed Charges    PT Eval/Re-eval Minutes 7  -RS         Total Minutes    Timed Charges Total Minutes 10  -RS      Untimed Charges Total Minutes 7  -RS       Total Minutes 17  -RS            User Key  (r) = Recorded By, (t) = Taken By, (c) = Cosigned By    Initials Name Provider Type    RS Sia Pierce, PT Physical Therapist              Therapy Charges for Today     Code Description Service Date Service Provider Modifiers Qty    59328776054 HC PT THERAPEUTIC ACT EA 15 MIN 2/12/2023 Sia Pierce, PT GP 1    32695399118 HC PT RE-EVAL ESTABLISHED PLAN 2 2/12/2023 Sia Pierce PT GP 1          PT G-Codes  Outcome Measure Options: AM-PAC 6 Clicks Basic Mobility (PT)  AM-PAC 6 Clicks Score (PT): 6  PT Discharge Summary  Anticipated Discharge Disposition (PT): skilled nursing facility    Sia Pierce PT  2/12/2023

## 2023-02-12 NOTE — PROGRESS NOTES
Name: Cara Garvey ADMIT: 2023   : 1934  PCP: Princess Yi MD    MRN: [id number] LOS: 3 days   AGE/SEX: 88 y.o. male  ROOM: [facility]     Subjective   Subjective   No pain at present. Very calm and comfortable. No SOA. No CP or palp. No N/V. No diaphoresis.  Family reports that he had some upper resp congestion after meal today.     Review of Systems     as above    Objective   Objective   Vital Signs  Temp:  [97.1 °F (36.2 °C)-97.9 °F (36.6 °C)] 97.5 °F (36.4 °C)  Heart Rate:  [54-83] 64  Resp:  [16-18] 18  BP: (112-146)/(51-63) 116/51  SpO2:  [92 %-95 %] 95 %  on   ;   Device (Oxygen Therapy): room air  Body mass index is 27.49 kg/m².  Physical Exam  Vitals and nursing note reviewed. Exam conducted with a chaperone present (Grand-dtr).   Constitutional:       General: He is not in acute distress.     Appearance: He is ill-appearing (chronically). He is not toxic-appearing or diaphoretic.   HENT:      Head: Normocephalic.      Nose: Nose normal.      Mouth/Throat:      Mouth: Mucous membranes are moist.      Pharynx: Oropharynx is clear.      Comments: Edentulous  Eyes:      General: No scleral icterus.        Right eye: No discharge.         Left eye: No discharge.      Extraocular Movements: Extraocular movements intact.      Conjunctiva/sclera: Conjunctivae normal.   Cardiovascular:      Rate and Rhythm: Normal rate and regular rhythm.      Pulses: Normal pulses.   Pulmonary:      Effort: Pulmonary effort is normal. No respiratory distress.      Breath sounds: Normal breath sounds. No wheezing or rales.   Abdominal:      General: Bowel sounds are normal. There is no distension.      Palpations: Abdomen is soft.      Tenderness: There is no abdominal tenderness.   Musculoskeletal:         General: No swelling or deformity.      Cervical back: Neck supple. No rigidity.      Comments: RUE in sling, NVI in distal RUE  NVI in distal RLE   Lymphadenopathy:      Cervical: No cervical  adenopathy.   Skin:     General: Skin is warm and dry.      Capillary Refill: Capillary refill takes less than 2 seconds.      Coloration: Skin is not jaundiced.      Findings: No rash.   Neurological:      Mental Status: He is alert. Mental status is at baseline.      Cranial Nerves: No cranial nerve deficit.      Coordination: Coordination normal.      Comments: Oriented to person   Psychiatric:         Mood and Affect: Mood normal.         Behavior: Behavior normal.      Comments: Pleasant       Results Review     I reviewed the patient's new clinical results.  Results from last 7 days   Lab Units 02/12/23  0502 02/11/23  0704 02/10/23  0738 02/09/23  0652   WBC 10*3/mm3 6.92 9.92 8.05 8.28   HEMOGLOBIN g/dL 8.5* 9.6* 10.5* 10.1*   PLATELETS 10*3/mm3 153 152 124* 116*     Results from last 7 days   Lab Units 02/12/23  0502 02/11/23  0704 02/10/23  0738 02/09/23  0652   SODIUM mmol/L 138 138 139 138   POTASSIUM mmol/L 3.8 4.0 3.7 4.5   CHLORIDE mmol/L 106 104 104 105   CO2 mmol/L 23.0 21.1* 21.1* 18.9*   BUN mg/dL 32* 28* 24* 25*   CREATININE mg/dL 1.02 0.98 0.95 0.98   GLUCOSE mg/dL 103* 113* 101* 91   EGFR mL/min/1.73 70.7 74.2 77.0 74.2     Results from last 7 days   Lab Units 02/12/23  0502 02/11/23  0704 02/10/23  0738   ALBUMIN g/dL 3.0* 3.3* 3.0*   BILIRUBIN mg/dL 0.7 0.6  --    ALK PHOS U/L 62 67  --    AST (SGOT) U/L 22 22  --    ALT (SGPT) U/L 7 8  --      Results from last 7 days   Lab Units 02/12/23  0502 02/11/23  0704 02/10/23  0738 02/09/23  0652   CALCIUM mg/dL 7.9* 8.0* 7.8* 7.8*   ALBUMIN g/dL 3.0* 3.3* 3.0*  --    MAGNESIUM mg/dL 2.1 1.9  --   --        No results found for: HGBA1C, POCGLU    XR Hip 1 View Without Pelvis Right (Surgery Only)    Result Date: 2/10/2023   Postsurgical changes.    This report was finalized on 2/10/2023 5:33 PM by Dr. Eddie Jones M.D.      CT Upper Extremity Right Without Contrast    Result Date: 2/11/2023   Evidence of nondisplaced fracture at the  intercondylar groove of the distal end of the right humerus.  This report was finalized on 2/11/2023 2:36 PM by Dr. Eddie Jones M.D.      I have personally reviewed all medications:  Scheduled Medications  apixaban, 2.5 mg, Oral, Q12H  atorvastatin, 20 mg, Oral, Daily  carvedilol, 12.5 mg, Oral, BID With Meals  cholecalciferol, 1,000 Units, Oral, Daily  docusate sodium, 200 mg, Oral, BID  finasteride, 5 mg, Oral, Daily  levothyroxine, 25 mcg, Oral, Q AM  losartan, 25 mg, Oral, Q24H  melatonin, 6 mg, Oral, Nightly  memantine, 10 mg, Oral, Q12H  OLANZapine, 5 mg, Intramuscular, Once  senna-docusate sodium, 2 tablet, Oral, BID  sodium chloride, 10 mL, Intravenous, Q12H  tamsulosin, 0.4 mg, Oral, Nightly  vitamin D, 50,000 Units, Oral, Q7 Days    Infusions   Diet  Diet: Regular/House Diet; Texture: Regular Texture (IDDSI 7); Fluid Consistency: Thin (IDDSI 0)    I have personally reviewed:  [x]  Laboratory   []  Microbiology   [x]  Radiology   []  EKG/Telemetry  []  Cardiology/Vascular   []  Pathology    []  Records       Assessment/Plan     Active Hospital Problems    Diagnosis  POA   • **Fracture of femoral neck, right (HCC) [S72.001A]  Yes   • Closed nondisplaced fracture of medial condyle of right humerus [S42.464A]  Yes   • Moderate dementia without behavioral disturbance [F03.B0]  Yes   • Vitamin D deficiency [E55.9]  Yes   • CAD (coronary artery disease) [I25.10]  Yes   • GERD (gastroesophageal reflux disease) [K21.9]  Yes   • Closed fracture of proximal end of right humerus, unspecified fracture morphology, initial encounter [S42.201A]  Yes   • HTN (hypertension) [I10]  Yes   • HLD (hyperlipidemia) [E78.5]  Yes      Resolved Hospital Problems   No resolved problems to display.       Mr. Garvey is a 88 y.o. non-smoker with a history of CAD s/p stent to LAD, HTN, HLD, and GERD, who presented to [facility] secondary to shoulder pain after unwitnessed mechanical fall. Imaging revealed right  proximal humerus fracture. Later in admission was found to have right hip fracture and right distal humerus fractures as well.     Right proximal humerus fracture  Right distal humerus fracture: Orthopedic Surgery evaluated, recommend conservative management, no surgery required. NWB RUE. Continue to utilize sling, can remove sling for hygiene as well as regular elbow motion as tolerated. Follow-up with Orthopedic Surgery in 2 weeks as outpt.     Right femoral neck fracture:  Patient complaied of acute right hip pain after admission, x-ray showed right femoral neck fracture. Orthopedic Surgery consulted. Pt underwent bipolar arthroplasty and abductor tendon repair 2/10 by Dr. Lawler. BID Eliquis ordered by Ortho for DVT ppx. F/u with Ortho in 2 weeks.     Hypocalcemia:  Calcium 7.9 this morning. Ionized calcium was wnl. Vitamin D only 21.4. Have started oral ergocalciferol.     Dementia: Patient oriented to name. Much more calm today. Continue home Namenda. Patient resides in memory care unit.     Essential hypertension: BPs better since restarting Losartan. Continue Coreg.     Hypothyroidism: Continue levothyroxine. TSH wnl.     Anemia of chronic disease: Iron studies consistent anemia of chronic disease. B12 and folate normal. Hgb down a bit again today. Will continue to monitor and transfuse if <7.5.    CAD: no CP or SOA. Continue BB and statin. Not on antiplatelet prior to admission.    Dysphagia?: will ask SLP to see. Lung exam is fine today and no hypoxia or leukocytosis.       · Eliquis BID for DVT prophylaxis per Ortho.  · Full code.  · Discussed with patient and granddtr.  · Anticipate discharge to SNU facility, timing yet to be determined.      Krishna Moon MD  [facility]  02/12/23  12:07 EST

## 2023-02-12 NOTE — NURSING NOTE
1400: Per Dr. Moon pt choked after meal, speech consult placed. Requested pt be made NPO and pain meds be changed to IV administration. Per Dr. Moon NPO with sips with meds and pills ok to give with applesauce. Probably best to crush pills in applesauce as well.

## 2023-02-12 NOTE — PLAN OF CARE
Goal Outcome Evaluation:  Plan of Care Reviewed With: patient        Progress: no change  Outcome Evaluation: Pt is POD2 s/p R posterior hip and R UE in sling/NWB. He is agreeable to participate with PT this date although reports pain throughout. He performed supine <> sit with maxAx2 with frequently crying out in pain during transition. Attempted LAQ however pt unable, he does perform AP whie seated. RUE remain in sling and soft collar donned throoughout. Pt remains appropriate for skilled care and will likely DC to SNU.

## 2023-02-12 NOTE — PROGRESS NOTES
Parveen Soriano MD     Orthopedic Progress Note    Subjective :   Patient is still overall very somnolent but does complain of pain.  He complains of pain in his arm and his hip.  He was sent for a CT scan.  He continues in the sling    Objective :    Vital signs in last 24 hours:  Temp:  [97.1 °F (36.2 °C)-97.9 °F (36.6 °C)] 97.5 °F (36.4 °C)  Heart Rate:  [54-83] 64  Resp:  [16-18] 18  BP: (112-146)/(51-63) 116/51  Vitals:    02/11/23 2210 02/12/23 0140 02/12/23 0634 02/12/23 0953   BP: 115/56 112/57 135/63 116/51   BP Location: Left arm Left arm Left arm Left arm   Patient Position: Lying Lying Lying Lying   Pulse: 61 61 61 64   Resp: 16 16 16 18   Temp: 97.8 °F (36.6 °C) 97.7 °F (36.5 °C) 97.6 °F (36.4 °C) 97.5 °F (36.4 °C)   TempSrc: Oral Oral Oral Oral   SpO2: 93% 95% 93% 95%   Weight:       Height:           PHYSICAL EXAM:  Patient is calm, in no acute distress, awake and oriented x 3.  Dressing clean, dry and intact.  No signs of infection.  Swelling is appropriate.  Ecchymosis is appropriate in amount.  Homans test is negative.  Patient is neurovascularly intact distally.  Some bruising and ecchymosis to the right thigh.  Sling is in place to the right upper extremity.  Wrist flexion extension is intact.  EPL FPL function is intact.  Sensation is intact light touch radial median ulnar nerve    LABS:  Results from last 7 days   Lab Units 02/12/23  0502   WBC 10*3/mm3 6.92   HEMOGLOBIN g/dL 8.5*   HEMATOCRIT % 24.9*   PLATELETS 10*3/mm3 153     Results from last 7 days   Lab Units 02/12/23  0502   SODIUM mmol/L 138   POTASSIUM mmol/L 3.8   CHLORIDE mmol/L 106   CO2 mmol/L 23.0   BUN mg/dL 32*   CREATININE mg/dL 1.02   GLUCOSE mg/dL 103*   CALCIUM mg/dL 7.9*           Intake/Output                       02/10/23 0701 - 02/11/23 0700 02/11/23 0701 - 02/12/23 0700 0701-1900 1901-0700 Total 0701-1900 1901-0700 Total                 Intake    P.O.  --  320 320  220  -- 220    I.V.  1100  1000 2100  --  -- --     Total Intake 1100 1320 2420 220 -- 220       Output    Urine  --  500 500  --  185 185    Total Output -- 500 500 -- 185 185           ASSESSMENT:  Status post right hip hemiarthroplasty posterior approach  Nondisplaced fracture, right distal humerus    Plan:  Continue Physical Therapy, weightbearing as tolerated to right lower extremity with posterior hip dislocation precautions    Imaging of the right humerus was reviewed including the CT scan.  The patient does have evidence of a nondisplaced fracture of the medial condyle/trochlea of the right distal humerus.    I recommend nonoperative management for this injury.  He will continue in his sling.  However, we will need to keep him nonweightbearing to the right upper extremity to prevent fracture displacement.  .  Continue SCDs, Continue Eliquis 2.5 mg    Orthopedics will sign off, please call if any additional questions or concerns arise.  Thank you for the consultation    The patient will need to follow-up with us in 2 weeks for a wound check as well as surveillance x-rays of the right humerus  Parveen Soriano MD    Date: 2/12/2023  Time: 11:20 EST    Parveen Soriano MD  Orthopaedic Surgeon  [facility]

## 2023-02-13 LAB
ALBUMIN SERPL-MCNC: 3 G/DL (ref 3.5–5.2)
ALBUMIN/GLOB SERPL: 1.4 G/DL
ALP SERPL-CCNC: 80 U/L (ref 39–117)
ALT SERPL W P-5'-P-CCNC: 7 U/L (ref 1–41)
ANION GAP SERPL CALCULATED.3IONS-SCNC: 9.6 MMOL/L (ref 5–15)
AST SERPL-CCNC: 19 U/L (ref 1–40)
BILIRUB SERPL-MCNC: 0.7 MG/DL (ref 0–1.2)
BUN SERPL-MCNC: 29 MG/DL (ref 8–23)
BUN/CREAT SERPL: 30.2 (ref 7–25)
CALCIUM SPEC-SCNC: 8.1 MG/DL (ref 8.6–10.5)
CHLORIDE SERPL-SCNC: 106 MMOL/L (ref 98–107)
CO2 SERPL-SCNC: 24.4 MMOL/L (ref 22–29)
CREAT SERPL-MCNC: 0.96 MG/DL (ref 0.76–1.27)
DEPRECATED RDW RBC AUTO: 42.8 FL (ref 37–54)
EGFRCR SERPLBLD CKD-EPI 2021: 76 ML/MIN/1.73
ERYTHROCYTE [DISTWIDTH] IN BLOOD BY AUTOMATED COUNT: 12.6 % (ref 12.3–15.4)
GLOBULIN UR ELPH-MCNC: 2.2 GM/DL
GLUCOSE SERPL-MCNC: 88 MG/DL (ref 65–99)
HCT VFR BLD AUTO: 25.1 % (ref 37.5–51)
HGB BLD-MCNC: 8.4 G/DL (ref 13–17.7)
MAGNESIUM SERPL-MCNC: 2.2 MG/DL (ref 1.6–2.4)
MCH RBC QN AUTO: 31.2 PG (ref 26.6–33)
MCHC RBC AUTO-ENTMCNC: 33.5 G/DL (ref 31.5–35.7)
MCV RBC AUTO: 93.3 FL (ref 79–97)
PLATELET # BLD AUTO: 169 10*3/MM3 (ref 140–450)
PMV BLD AUTO: 9.8 FL (ref 6–12)
POTASSIUM SERPL-SCNC: 3.8 MMOL/L (ref 3.5–5.2)
PROT SERPL-MCNC: 5.2 G/DL (ref 6–8.5)
RBC # BLD AUTO: 2.69 10*6/MM3 (ref 4.14–5.8)
SODIUM SERPL-SCNC: 140 MMOL/L (ref 136–145)
WBC NRBC COR # BLD: 6.37 10*3/MM3 (ref 3.4–10.8)

## 2023-02-13 PROCEDURE — 94640 AIRWAY INHALATION TREATMENT: CPT

## 2023-02-13 PROCEDURE — 94799 UNLISTED PULMONARY SVC/PX: CPT

## 2023-02-13 PROCEDURE — 97530 THERAPEUTIC ACTIVITIES: CPT

## 2023-02-13 PROCEDURE — 92610 EVALUATE SWALLOWING FUNCTION: CPT

## 2023-02-13 PROCEDURE — 83735 ASSAY OF MAGNESIUM: CPT | Performed by: HOSPITALIST

## 2023-02-13 PROCEDURE — 85027 COMPLETE CBC AUTOMATED: CPT | Performed by: HOSPITALIST

## 2023-02-13 PROCEDURE — 80053 COMPREHEN METABOLIC PANEL: CPT | Performed by: HOSPITALIST

## 2023-02-13 RX ORDER — ALBUTEROL SULFATE 2.5 MG/3ML
2.5 SOLUTION RESPIRATORY (INHALATION) EVERY 6 HOURS PRN
Status: DISCONTINUED | OUTPATIENT
Start: 2023-02-13 | End: 2023-02-16 | Stop reason: HOSPADM

## 2023-02-13 RX ORDER — ALBUTEROL SULFATE 0.63 MG/3ML
0.63 SOLUTION RESPIRATORY (INHALATION) EVERY 6 HOURS PRN
Status: DISCONTINUED | OUTPATIENT
Start: 2023-02-13 | End: 2023-02-13

## 2023-02-13 RX ADMIN — DOCUSATE SODIUM 50MG AND SENNOSIDES 8.6MG 2 TABLET: 8.6; 5 TABLET, FILM COATED ORAL at 09:02

## 2023-02-13 RX ADMIN — DOCUSATE SODIUM 200 MG: 100 CAPSULE, LIQUID FILLED ORAL at 20:08

## 2023-02-13 RX ADMIN — HYDROCODONE BITARTRATE AND ACETAMINOPHEN 1 TABLET: 5; 325 TABLET ORAL at 04:44

## 2023-02-13 RX ADMIN — ATORVASTATIN CALCIUM 20 MG: 20 TABLET, FILM COATED ORAL at 12:34

## 2023-02-13 RX ADMIN — ALBUTEROL SULFATE 2.5 MG: 2.5 SOLUTION RESPIRATORY (INHALATION) at 16:43

## 2023-02-13 RX ADMIN — CARVEDILOL 12.5 MG: 12.5 TABLET, FILM COATED ORAL at 08:59

## 2023-02-13 RX ADMIN — HYDROCODONE BITARTRATE AND ACETAMINOPHEN 1 TABLET: 5; 325 TABLET ORAL at 17:30

## 2023-02-13 RX ADMIN — POLYETHYLENE GLYCOL 3350 17 G: 17 POWDER, FOR SOLUTION ORAL at 20:07

## 2023-02-13 RX ADMIN — HYDROCODONE BITARTRATE AND ACETAMINOPHEN 1 TABLET: 5; 325 TABLET ORAL at 12:31

## 2023-02-13 RX ADMIN — Medication 10 ML: at 09:06

## 2023-02-13 RX ADMIN — DOCUSATE SODIUM 50MG AND SENNOSIDES 8.6MG 2 TABLET: 8.6; 5 TABLET, FILM COATED ORAL at 20:07

## 2023-02-13 RX ADMIN — CARVEDILOL 12.5 MG: 12.5 TABLET, FILM COATED ORAL at 17:32

## 2023-02-13 RX ADMIN — APIXABAN 2.5 MG: 2.5 TABLET, FILM COATED ORAL at 20:07

## 2023-02-13 RX ADMIN — LOSARTAN POTASSIUM 25 MG: 25 TABLET, FILM COATED ORAL at 09:01

## 2023-02-13 RX ADMIN — MEMANTINE HYDROCHLORIDE 10 MG: 10 TABLET, FILM COATED ORAL at 20:12

## 2023-02-13 RX ADMIN — LEVOTHYROXINE SODIUM 25 MCG: 0.03 TABLET ORAL at 05:34

## 2023-02-13 RX ADMIN — BISACODYL 5 MG: 5 TABLET ORAL at 20:11

## 2023-02-13 RX ADMIN — Medication 1000 UNITS: at 09:06

## 2023-02-13 RX ADMIN — HYDROCODONE BITARTRATE AND ACETAMINOPHEN 1 TABLET: 5; 325 TABLET ORAL at 00:09

## 2023-02-13 RX ADMIN — TAMSULOSIN HYDROCHLORIDE 0.4 MG: 0.4 CAPSULE ORAL at 20:12

## 2023-02-13 RX ADMIN — APIXABAN 2.5 MG: 2.5 TABLET, FILM COATED ORAL at 09:01

## 2023-02-13 RX ADMIN — MEMANTINE HYDROCHLORIDE 10 MG: 10 TABLET, FILM COATED ORAL at 12:36

## 2023-02-13 RX ADMIN — HYDROCODONE BITARTRATE AND ACETAMINOPHEN 1 TABLET: 5; 325 TABLET ORAL at 22:45

## 2023-02-13 RX ADMIN — FINASTERIDE 5 MG: 5 TABLET, FILM COATED ORAL at 12:43

## 2023-02-13 RX ADMIN — Medication 6 MG: at 20:07

## 2023-02-13 RX ADMIN — DOCUSATE SODIUM 200 MG: 100 CAPSULE, LIQUID FILLED ORAL at 12:42

## 2023-02-13 NOTE — PLAN OF CARE
Goal Outcome Evaluation:  Plan of Care Reviewed With: patient           Outcome Evaluation: Clinical swallow evaluation completed recommend regular solids with NTL, meds whole with puree and small bites and sips.

## 2023-02-13 NOTE — PLAN OF CARE
Goal Outcome Evaluation:  Plan of Care Reviewed With: patient        Progress: no change  Outcome Evaluation: VSS, pt cont voiding per brief. Medication given crushed with apple sauce with sips of water tolerated well. RUE remain on a sling which the pt managed to remove at times. Pain controlled with prn pain meds. Educated on medication management and bp monitoring.

## 2023-02-13 NOTE — PROGRESS NOTES
Name: Cara Garvey ADMIT: 2023   : 1934  PCP: Princess Yi MD    MRN: [id number] LOS: 4 days   AGE/SEX: 88 y.o. male  ROOM: [facility]     Subjective   Subjective   No pain at present. Needs to void and still doesn't understand use of PureWick despite RN's continued teaching. No SOA. No CP or palp. No N/V. No diaphoresis. Keeps taking sling off.    Review of Systems     as above    Objective   Objective   Vital Signs  Temp:  [97.8 °F (36.6 °C)-98.8 °F (37.1 °C)] 98.3 °F (36.8 °C)  Heart Rate:  [68-72] 69  Resp:  [16-18] 16  BP: (130-146)/(56-71) 146/56  SpO2:  [92 %-96 %] 96 %  on   ;   Device (Oxygen Therapy): room air  Body mass index is 27.49 kg/m².  Physical Exam  Vitals and nursing note reviewed. Exam conducted with a chaperone present (Dtr and RN).   Constitutional:       General: He is not in acute distress.     Appearance: He is ill-appearing (chronically). He is not toxic-appearing or diaphoretic.   HENT:      Head: Normocephalic.      Nose: Nose normal.      Mouth/Throat:      Mouth: Mucous membranes are moist.      Pharynx: Oropharynx is clear.      Comments: Edentulous  Eyes:      General: No scleral icterus.        Right eye: No discharge.         Left eye: No discharge.      Extraocular Movements: Extraocular movements intact.      Conjunctiva/sclera: Conjunctivae normal.   Cardiovascular:      Rate and Rhythm: Normal rate and regular rhythm.      Pulses: Normal pulses.   Pulmonary:      Effort: Pulmonary effort is normal. No respiratory distress.      Breath sounds: Normal breath sounds. No wheezing or rales.   Abdominal:      General: Bowel sounds are normal. There is no distension.      Palpations: Abdomen is soft.      Tenderness: There is no abdominal tenderness.   Musculoskeletal:         General: No swelling or deformity.      Cervical back: Neck supple. No rigidity.      Comments: NVI in distal RUE  NVI in distal RLE   Lymphadenopathy:      Cervical: No cervical  adenopathy.   Skin:     General: Skin is warm and dry.      Capillary Refill: Capillary refill takes less than 2 seconds.      Coloration: Skin is not jaundiced.      Findings: No rash.   Neurological:      Mental Status: He is alert. Mental status is at baseline.      Cranial Nerves: No cranial nerve deficit.      Coordination: Coordination normal.      Comments: Oriented to person   Psychiatric:         Mood and Affect: Mood normal.         Behavior: Behavior normal.      Comments: Pleasant       Results Review     I reviewed the patient's new clinical results.  Results from last 7 days   Lab Units 02/13/23  0421 02/12/23  0502 02/11/23  0704 02/10/23  0738   WBC 10*3/mm3 6.37 6.92 9.92 8.05   HEMOGLOBIN g/dL 8.4* 8.5* 9.6* 10.5*   PLATELETS 10*3/mm3 169 153 152 124*     Results from last 7 days   Lab Units 02/13/23  0421 02/12/23  0502 02/11/23  0704 02/10/23  0738   SODIUM mmol/L 140 138 138 139   POTASSIUM mmol/L 3.8 3.8 4.0 3.7   CHLORIDE mmol/L 106 106 104 104   CO2 mmol/L 24.4 23.0 21.1* 21.1*   BUN mg/dL 29* 32* 28* 24*   CREATININE mg/dL 0.96 1.02 0.98 0.95   GLUCOSE mg/dL 88 103* 113* 101*   EGFR mL/min/1.73 76.0 70.7 74.2 77.0     Results from last 7 days   Lab Units 02/13/23  0421 02/12/23  0502 02/11/23  0704 02/10/23  0738   ALBUMIN g/dL 3.0* 3.0* 3.3* 3.0*   BILIRUBIN mg/dL 0.7 0.7 0.6  --    ALK PHOS U/L 80 62 67  --    AST (SGOT) U/L 19 22 22  --    ALT (SGPT) U/L 7 7 8  --      Results from last 7 days   Lab Units 02/13/23 0421 02/12/23 0502 02/11/23  0704 02/10/23  0738   CALCIUM mg/dL 8.1* 7.9* 8.0* 7.8*   ALBUMIN g/dL 3.0* 3.0* 3.3* 3.0*   MAGNESIUM mg/dL 2.2 2.1 1.9  --        No results found for: HGBA1C, POCGLU    CT Upper Extremity Right Without Contrast    Result Date: 2/11/2023   Evidence of nondisplaced fracture at the intercondylar groove of the distal end of the right humerus.  This report was finalized on 2/11/2023 2:36 PM by Dr. Eddie Jones M.D.      I have personally  reviewed all medications:  Scheduled Medications  apixaban, 2.5 mg, Oral, Q12H  atorvastatin, 20 mg, Oral, Daily  carvedilol, 12.5 mg, Oral, BID With Meals  cholecalciferol, 1,000 Units, Oral, Daily  docusate sodium, 200 mg, Oral, BID  finasteride, 5 mg, Oral, Daily  levothyroxine, 25 mcg, Oral, Q AM  losartan, 25 mg, Oral, Q24H  melatonin, 6 mg, Oral, Nightly  memantine, 10 mg, Oral, Q12H  OLANZapine, 5 mg, Intramuscular, Once  senna-docusate sodium, 2 tablet, Oral, BID  sodium chloride, 10 mL, Intravenous, Q12H  tamsulosin, 0.4 mg, Oral, Nightly  vitamin D, 50,000 Units, Oral, Q7 Days    Infusions   Diet  Diet: Regular/House Diet; Texture: Regular Texture (IDDSI 7); Fluid Consistency: Nectar Thick    I have personally reviewed:  [x]  Laboratory   []  Microbiology   []  Radiology   []  EKG/Telemetry  []  Cardiology/Vascular   []  Pathology    []  Records       Assessment/Plan     Active Hospital Problems    Diagnosis  POA   • **Fracture of femoral neck, right (HCC) [S72.001A]  Yes   • Closed nondisplaced fracture of medial condyle of right humerus [S42.044A]  Yes   • Moderate dementia without behavioral disturbance [F03.B0]  Yes   • Vitamin D deficiency [E55.9]  Yes   • CAD (coronary artery disease) [I25.10]  Yes   • GERD (gastroesophageal reflux disease) [K21.9]  Yes   • Closed fracture of proximal end of right humerus, unspecified fracture morphology, initial encounter [S42.201A]  Yes   • HTN (hypertension) [I10]  Yes   • HLD (hyperlipidemia) [E78.5]  Yes      Resolved Hospital Problems   No resolved problems to display.       Mr. Garvey is a 88 y.o. non-smoker with a history of CAD s/p stent to LAD, HTN, HLD, and GERD, who presented to [facility] secondary to shoulder pain after unwitnessed mechanical fall. Imaging revealed right proximal humerus fracture. Later in admission was found to have right hip fracture and right distal humerus fractures as well.     Right proximal humerus fracture  Right  distal humerus fracture: Orthopedic Surgery evaluated, recommend conservative management, no surgery required. NWB RUE. Continue to utilize sling, can remove sling for hygiene as well as regular elbow motion as tolerated. Follow-up with Orthopedic Surgery in 2 weeks as outpt.     Right femoral neck fracture:  Patient complaied of acute right hip pain after admission, x-ray showed right femoral neck fracture. Orthopedic Surgery consulted. Pt underwent bipolar arthroplasty and abductor tendon repair 2/10 by Dr. Lawler. BID Eliquis ordered by Ortho for DVT ppx. F/u with Ortho in 2 weeks.     Hypocalcemia:  Calcium 8.1 this morning. Vitamin D only 21.4. Have started oral ergocalciferol.     Dementia: Patient oriented to name. Much more calm today. Continue home Namenda. Patient resides in memory care unit.     Essential hypertension: BPs better since restarting Losartan. Continue Coreg as well.     Hypothyroidism: Continue levothyroxine. TSH wnl.     Anemia of chronic disease: Iron studies consistent anemia of chronic disease. B12 and folate normal. Hgb stabilizing now around 8.5. Continue to monitor.    CAD: no CP or SOA. Continue BB and statin. Not on antiplatelet prior to admission.    O/P Dysphagia: Lung exam is fine today and no hypoxia or leukocytosis. SLP has seen. Modified diet in place (regular solids with NTL).       · Eliquis BID for DVT prophylaxis per Ortho.  · Full code.  · Discussed with patient, dtr, and RN.  · Anticipate discharge to SNU facility when arrangements are in place.      Krishna Moon MD  [facility]  02/13/23  12:13 EST

## 2023-02-13 NOTE — THERAPY EVALUATION
Acute Care - Speech Language Pathology   Swallow Re-Evaluation [facility]     Patient Name: Cara Garvey  : 1934  MRN: [id number]  Today's Date: 2023               Admit Date: 2023    Visit Dx:     ICD-10-CM ICD-9-CM   1. Closed fracture of proximal end of right humerus, unspecified fracture morphology, initial encounter  S42.201A 812.00   2. History of dementia  Z86.59 V11.8   3. Compression fracture of thoracic vertebra, unspecified thoracic vertebral level, initial encounter (formerly [facility])  S22.000A 805.2     Patient Active Problem List   Diagnosis   • Closed fracture of proximal end of right humerus, unspecified fracture morphology, initial encounter   • HTN (hypertension)   • HLD (hyperlipidemia)   • CAD (coronary artery disease)   • GERD (gastroesophageal reflux disease)   • Fracture of femoral neck, right (formerly [facility])   • Moderate dementia without behavioral disturbance   • Vitamin D deficiency   • Closed nondisplaced fracture of medial condyle of right humerus     Past Medical History:   Diagnosis Date   • Coronary artery disease    • GERD (gastroesophageal reflux disease)    • Hyperlipidemia    • Hypertension      Past Surgical History:   Procedure Laterality Date   • HIP ENDOPROSTHESIS Right 2/10/2023    Procedure: RIGHT BIPOLAR  HIP/ POSTERIOR;  Surgeon: Gee Lawler MD;  Location: [facility];  Service: Orthopedics;  Laterality: Right;       SLP Recommendation and Plan  SLP Swallowing Diagnosis: (P) suspected pharyngeal dysphagia (23)  SLP Diet Recommendation: (P) regular textures, nectar thick liquids, water between meals after oral care, with supervision (23)  Recommended Precautions and Strategies: (P) upright posture during/after eating, small bites of food and sips of liquid (23)  SLP Rec. for Method of Medication Administration: (P) meds whole, with thick liquids, as tolerated (23)     Monitor for Signs of Aspiration: (P) yes, notify SLP if any  concerns (02/13/23 1200)     Swallow Criteria for Skilled Therapeutic Interventions Met: (P) demonstrates skilled criteria (02/13/23 1200)  Anticipated Discharge Disposition (SLP): (P) unknown (02/13/23 1200)  Rehab Potential/Prognosis, Swallowing: (P) good, to achieve stated therapy goals (02/13/23 1200)  Therapy Frequency (Swallow): (P) PRN (02/13/23 1200)  Predicted Duration Therapy Intervention (Days): (P) until discharge (02/13/23 1200)                                        Plan of Care Reviewed With: (P) patient      SWALLOW EVALUATION (last 72 hours)     SLP Adult Swallow Evaluation     Row Name 02/13/23 1200                   Rehab Evaluation    Document Type evaluation (P)   -AO        Subjective Information no complaints (P)   -AO        Patient Observations alert;cooperative (P)   -AO        Patient Effort good (P)   -AO        Symptoms Noted During/After Treatment none (P)   -AO           General Information    Patient Profile Reviewed yes (P)   -AO        Pertinent History Of Current Problem Admitted with right humerus fracture after a fall. History of mod dementia. Previous swallow evaluation recommended mechanical soft chopped and thin liquids. New orders due to upper respirtaory congestions after meal. No recent chest xrays. (P)   -AO        Current Method of Nutrition NPO (P)   -AO        Precautions/Limitations, Vision WFL;for purposes of eval (P)   -AO        Precautions/Limitations, Hearing WFL;for purposes of eval (P)   -AO        Prior Level of Function-Communication cognitive-linguistic impairment (P)   -AO        Prior Level of Function-Swallowing no diet consistency restrictions (P)   -AO        Plans/Goals Discussed with patient (P)   -AO        Barriers to Rehab medically complex (P)   -AO           Pain    Additional Documentation Pain Scale: FACES Pre/Post-Treatment (Group) (P)   -AO           Pain Scale: FACES Pre/Post-Treatment    Pain: FACES Scale, Pretreatment 0-->no hurt (P)   -AO         Posttreatment Pain Rating 0-->no hurt (P)   -AO           Oral Motor Structure and Function    Dentition Assessment upper dentures/partial in place;lower dentures/partial in place;natural, present and adequate (P)   -AO        Secretion Management WNL/WFL (P)   -AO        Mucosal Quality moist, healthy (P)   -AO           Oral Musculature and Cranial Nerve Assessment    Oral Motor General Assessment WFL (P)   -AO           General Eating/Swallowing Observations    Eating/Swallowing Skills self-fed (P)   -AO        Positioning During Eating upright in bed (P)   -AO        Utensils Used spoon;cup (P)   -AO        Consistencies Trialed regular textures;chopped;mixed consistency;pureed;thin liquids;nectar/syrup-thick liquids (P)   -AO           Clinical Swallow Eval    Clinical Swallow Evaluation Summary No overt s/s of pen/asp with NTL via cup, puree, mechanical soft chopped, and regular solid. Patient coughed 1 out of 5 trials of thins via cup. Adequate mastication. Adequate laryngeal elevation during neck palpation. (P)   -AO           SLP Evaluation Clinical Impression    SLP Swallowing Diagnosis suspected pharyngeal dysphagia (P)   -AO        Functional Impact risk of aspiration/pneumonia (P)   -AO        Rehab Potential/Prognosis, Swallowing good, to achieve stated therapy goals (P)   -AO        Swallow Criteria for Skilled Therapeutic Interventions Met demonstrates skilled criteria (P)   -AO           Recommendations    Therapy Frequency (Swallow) PRN (P)   -AO        Predicted Duration Therapy Intervention (Days) until discharge (P)   -AO        SLP Diet Recommendation regular textures;nectar thick liquids;water between meals after oral care, with supervision (P)   -AO        Recommended Precautions and Strategies upright posture during/after eating;small bites of food and sips of liquid (P)   -AO        Oral Care Recommendations Oral Care BID/PRN (P)   -AO        SLP Rec. for Method of Medication  Administration meds whole;with thick liquids;as tolerated (P)   -AO        Monitor for Signs of Aspiration yes;notify SLP if any concerns (P)   -AO        Anticipated Discharge Disposition (SLP) unknown (P)   -AO           (LTG) Patient will demonstrate functional swallow for    Liquid viscosity (Demonstrate functional swallow) nectar/ mildly thick liquids (P)   -AO        [place] (Demonstrate functional swallow) independently (over 90% accuracy) (P)   -AO        Time Frame (Demonstrate functional swallow) by discharge (P)   -AO              User Key  (r) = Recorded By, (t) = Taken By, (c) = Cosigned By    Initials Name Effective Dates    AO Gali Luu Speech Therapy Student 01/12/23 -                 EDUCATION  The patient has been educated in the following areas:   Dysphagia (Swallowing Impairment).        SLP GOALS     Row Name 02/13/23 1200             (LTG) Patient will demonstrate functional swallow for    Liquid viscosity (Demonstrate functional swallow) nectar/ mildly thick liquids (P)   -AO      [place] (Demonstrate functional swallow) independently (over 90% accuracy) (P)   -AO      Time Frame (Demonstrate functional swallow) by discharge (P)   -AO            User Key  (r) = Recorded By, (t) = Taken By, (c) = Cosigned By    Initials Name Provider Type    AO Gali Luu Speech Therapy Student SLP Student                   Time Calculation:    Time Calculation- SLP     Row Name 02/13/23 1225             Time Calculation- SLP    SLP Start Time 1045 (P)   -AO      SLP Received On 02/13/23 (P)   -AO         Untimed Charges    SLP Eval/Re-eval  ST Eval Oral Pharyng Swallow - 49424 (P)   -AO      87223-HW Eval Oral Pharyng Swallow Minutes 45 (P)   -AO         Total Minutes    Untimed Charges Total Minutes 45 (P)   -AO       Total Minutes 45 (P)   -AO            User Key  (r) = Recorded By, (t) = Taken By, (c) = Cosigned By    Initials Name Provider Type    Gali Biggs Speech Therapy Student SLP Student                 Therapy Charges for Today     Code Description Service Date Service Provider Modifiers Qty    [id number]  ST EVAL ORAL PHARYNG SWALLOW 3 2/13/2023 Gali Luu, Speech Therapy Student GN 1               Gali Luu, Speech Therapy Student  2/13/2023

## 2023-02-13 NOTE — PROGRESS NOTES
"Physicians Statement of Medical Necessity for  Ambulance Transportation    GENERAL INFORMATION     Name: Cara Garvey  YOB: 1934  Medicare #: [id number]  Transport Date: 2/14 (Valid for round trips this date, or for scheduled repetitive trips for 60 days from the date signed below.)  Origin: [facility]  Destination: [facility]  Is the Patient's stay covered under Medicare Part A (PPS/DRG?)Yes  Closest appropriate facility? Yes  If this a hosp-hosp transfer? No  Is this a hospice patient? No    MEDICAL NECESSITY QUESTIONAIRE    Ambulance Transportation is medically necessary only if other means of transportation are contraindicated or would be potentially harmful to the patient.  To meet this requirement, the patient must be either \"bed confined\" or suffer from a condition such that transport by means other than an ambulance is contraindicated by the patient's condition.  The following questions must be answered by the healthcare professional signing below for this form to be valid:     1) Describe the MEDICAL CONDITION (physical and/or mental) of this patient AT THE TIME OF AMBULANCE TRANSPORT that requires the patient to be transported in an ambulance, and why transport by other means is contraindicated by the patient's condition:   Past Medical History:   Diagnosis Date   • Coronary artery disease    • GERD (gastroesophageal reflux disease)    • Hyperlipidemia    • Hypertension       Past Surgical History:   Procedure Laterality Date   • HIP ENDOPROSTHESIS Right 2/10/2023    Procedure: RIGHT BIPOLAR  HIP/ POSTERIOR;  Surgeon: Gee Lawler MD;  Location: [facility];  Service: Orthopedics;  Laterality: Right;      2) Is this patient \"bed confined\" as defined below?No    To be \"bed confined\" the patient must satisfy all three of the following criteria:  (1) unable to get up from bed without assistance; AND (2) unable to ambulate;  AND (3) unable to sit in a chair or " wheelchair.  3) Can this patient safely be transported by car or wheelchair van (I.e., may safely sit during transport, without an attendant or monitoring?)No   4. In addition to completing questions 1-3 above, please check any of the following conditions that apply*:          *Note: supporting documentation for any boxes checked must be maintained in the patient's medical records Patient is confused      SIGNATURE OF PHYSICIAN OR OTHER AUTHORIZED HEALTHCARE PROFESSIONAL    I certify that the above information is true and correct based on my evaluation of this patient, and represent that the patient requires transport by ambulance and that other forms of transport are contraindicated.  I understand that this information will be used by the Centers for Medicare and Medicaid Services (CMS) to support the determiniation of medical necessity for ambulance services, and I represent that I have personal knowledge of the patient's condition at the time of transport.       If this box is checked, I also certify that the patient is physically or mentally incapable of signing the ambulance service's claim form and that the institution with which I am affiliated has furnished care, services or assistance to the patient.  My signature below is made on behalf of the patient pursuant to 42 .36(b)(4). In accordance with 42 .37, the specific reason(s) that the patient is physically or mentally incapable of signing the claim for is as follows:     Signature of Physician or Healthcare Professional  Date/Time:        (For Scheduled repetitive transport, this form is not valid for transports performed more than 60 days after this date).                                                                                                                                            --------------------------------------------------------------------------------------------  Printed Name and Credentials of Physician or Authorized  Healthcare Professional     *Form must be signed by patient's attending physician for scheduled, repetitive transports,.  For non-repetitive ambulance transports, if unable to obtain the signature of the attending physician, any of the following may sign (please select below):     Physician  Clinical Nurse Specialist  Registered Nurse     Physician Assistant  Discharge Planner  Licensed Practical Nurse     Nurse Practitioner

## 2023-02-13 NOTE — THERAPY TREATMENT NOTE
Patient Name: Cara Garvey  : 1934    MRN: [id number]                              Today's Date: 2023       Admit Date: 2023    Visit Dx:     ICD-10-CM ICD-9-CM   1. Closed fracture of proximal end of right humerus, unspecified fracture morphology, initial encounter  S42.201A 812.00   2. History of dementia  Z86.59 V11.8   3. Compression fracture of thoracic vertebra, unspecified thoracic vertebral level, initial encounter ([facility])  S22.000A 805.2     Patient Active Problem List   Diagnosis   • Closed fracture of proximal end of right humerus, unspecified fracture morphology, initial encounter   • HTN (hypertension)   • HLD (hyperlipidemia)   • CAD (coronary artery disease)   • GERD (gastroesophageal reflux disease)   • Fracture of femoral neck, right ([facility])   • Moderate dementia without behavioral disturbance   • Vitamin D deficiency   • Closed nondisplaced fracture of medial condyle of right humerus     Past Medical History:   Diagnosis Date   • Coronary artery disease    • GERD (gastroesophageal reflux disease)    • Hyperlipidemia    • Hypertension      Past Surgical History:   Procedure Laterality Date   • HIP ENDOPROSTHESIS Right 2/10/2023    Procedure: RIGHT BIPOLAR  HIP/ POSTERIOR;  Surgeon: Gee Lawler MD;  Location: [facility];  Service: Orthopedics;  Laterality: Right;      General Information     Row Name 23 153          Physical Therapy Time and Intention    Mode of Treatment individual therapy;physical therapy  -DB     Row Name 23 153          General Information    Patient Profile Reviewed yes  -DB     Existing Precautions/Restrictions fall;hip, posterior;right;non-weight bearing   posterior hip precautions WBAT on RLE; NWB RUE in sling  -DB           User Key  (r) = Recorded By, (t) = Taken By, (c) = Cosigned By    Initials Name Provider Type    DB Ella Toro PT Physical Therapist               Mobility     Row Name 23 1533          Bed Mobility    Bed  Mobility sit-supine;supine-sit;scooting/bridging  -DB     Scooting/Bridging Storey (Bed Mobility) dependent (less than 25% patient effort);2 person assist;verbal cues;nonverbal cues (demo/gesture)  -DB     Supine-Sit [place] (Bed Mobility) maximum assist (25% patient effort);2 person assist  -DB     Sit-Supine [place] (Bed Mobility) maximum assist (25% patient effort);2 person assist  -DB     Assistive Device (Bed Mobility) bed rails;head of bed elevated  -DB     Row Name 02/13/23 1533          Sit-Stand Transfer    Sit-Stand [place] (Transfers) maximum assist (25% patient effort);2 person assist;verbal cues;nonverbal cues (demo/gesture)  -DB     Assistive Device (Sit-Stand Transfers) other (see comments)  HHA LUE  -DB     Comment, (Sit-Stand Transfer) able to stand for ~ 2min  -DB     Row Name 02/13/23 1533          Gait/Stairs (Locomotion)    Storey Level (Gait) unable to assess  -DB     Row Name 02/13/23 1533          Mobility    Extremity Weight-bearing Status right upper extremity;right lower extremity  -DB           User Key  (r) = Recorded By, (t) = Taken By, (c) = Cosigned By    Initials Name Provider Type    DB Ella Toro PT Physical Therapist               Obj/Interventions     Row Name 02/13/23 1538          Balance    Balance Assessment sitting static balance;sitting dynamic balance;standing static balance  -DB     Static Sitting Balance standby assist  -DB     Dynamic Sitting Balance standby assist  -DB     Position, Sitting Balance unsupported;sitting edge of bed  -DB     Static Standing Balance maximum assist;2-person assist  -DB     Position/Device Used, Standing Balance supported  -DB     Balance Interventions sitting;standing;sit to stand  -DB           User Key  (r) = Recorded By, (t) = Taken By, (c) = Cosigned By    Initials Name Provider Type    Ella Noyola PT Physical Therapist               Goals/Plan    No documentation.                Clinical  Impression     Row Name 02/13/23 1539          Pain    Pain Intervention(s) Ambulation/increased activity;Repositioned  -DB     Row Name 02/13/23 1539          Plan of Care Review    Plan of Care Reviewed With patient;daughter  -DB     Progress improving  -DB     Outcome Evaluation Pt was supine in bed at start of session and agreeable to PT. Daughter present in room during PT session. Pt req's maxA x 2 for bed mobility, but demo's improvement in sitting balance this date, sitting EOB with SBA. Pt performs STS today with LUE HHA and maxAx2. Pt was able to tolerate standing for ~ 2 min with cues for upright posture, difficulty shifting weight to RLE. Pt returns to supine at end of the session. He continues to benefit from skilled PT intervention.  -DB     Row Name 02/13/23 1539          Vital Signs    O2 Delivery Pre Treatment room air  -DB     O2 Delivery Intra Treatment room air  -DB     O2 Delivery Post Treatment room air  -DB     Pre Patient Position Supine  -DB     Intra Patient Position Standing  -DB     Post Patient Position Supine  -DB     Row Name 02/13/23 1539          Positioning and Restraints    Pre-Treatment Position in bed  -DB     Post Treatment Position bed  -DB     In Bed supine;call light within reach;encouraged to call for assist;exit alarm on;with family/caregiver  -DB           User Key  (r) = Recorded By, (t) = Taken By, (c) = Cosigned By    Initials Name Provider Type    Ella Noyola PT Physical Therapist               Outcome Measures     Row Name 02/13/23 1548 02/13/23 0800       How much help from another person do you currently need...    Turning from your back to your side while in flat bed without using bedrails? 2  -DB 1  -LD    Moving from lying on back to sitting on the side of a flat bed without bedrails? 2  -DB 1  -LD    Moving to and from a bed to a chair (including a wheelchair)? 1  -DB 1  -LD    Standing up from a chair using your arms (e.g., wheelchair, bedside chair)? 2   -DB 1  -LD    Climbing 3-5 steps with a railing? 1  -DB 1  -LD    To walk in hospital room? 1  -DB 1  -LD    AM-PAC 6 Clicks Score (PT) 9  -DB 6  -LD    Highest level of mobility 3 --> Sat at edge of bed  -DB 2 --> Bed activities/dependent transfer  -LD    Row Name 02/13/23 1548          Functional Assessment    Outcome Measure Options AM-PAC 6 Clicks Basic Mobility (PT)  -DB           User Key  (r) = Recorded By, (t) = Taken By, (c) = Cosigned By    Initials Name Provider Type    DB Ella Toro, PT Physical Therapist    Lucila Roy, RN Registered Nurse                             Physical Therapy Education     Title: PT OT SLP Therapies (In Progress)     Topic: Physical Therapy (In Progress)     Point: Mobility training (In Progress)     Learning Progress Summary           Patient Acceptance, E, NR by DB at 2/13/2023 1548    Acceptance, E, NR by RS at 2/12/2023 1216    Acceptance, E,TB, VU by MB at 2/9/2023 1113   Family Acceptance, E, VU by DB at 2/13/2023 1548                   Point: Home exercise program (In Progress)     Learning Progress Summary           Patient Acceptance, E, NR by DB at 2/13/2023 1548    Acceptance, E, NR by RS at 2/12/2023 1216   Family Acceptance, E, VU by DB at 2/13/2023 1548                   Point: Body mechanics (In Progress)     Learning Progress Summary           Patient Acceptance, E, NR by DB at 2/13/2023 1548    Acceptance, E, NR by RS at 2/12/2023 1216   Family Acceptance, E, VU by DB at 2/13/2023 1548                   Point: Precautions (In Progress)     Learning Progress Summary           Patient Acceptance, E, NR by DB at 2/13/2023 1548    Acceptance, E, NR by RS at 2/12/2023 1216   Family Acceptance, E, VU by DB at 2/13/2023 1548                               User Key     Initials Effective Dates Name Provider Type Discipline    DB 06/16/21 -  Ella Toro, PT Physical Therapist PT    RS 06/16/21 -  Sia Pierce, PT Physical Therapist PT    MB 12/30/22  -  Aamir Gresham, PT Student PT Student PT              PT Recommendation and Plan     Plan of Care Reviewed With: patient, daughter  Progress: improving  Outcome Evaluation: Pt was supine in bed at start of session and agreeable to PT. Daughter present in room during PT session. Pt req's maxA x 2 for bed mobility, but demo's improvement in sitting balance this date, sitting EOB with SBA. Pt performs STS today with LUE HHA and maxAx2. Pt was able to tolerate standing for ~ 2 min with cues for upright posture, difficulty shifting weight to RLE. Pt returns to supine at end of the session. He continues to benefit from skilled PT intervention.     Time Calculation:    PT Charges     Row Name 02/13/23 1549             Time Calculation    Start Time 1439  -DB      Stop Time 1503  -DB      Time Calculation (min) 24 min  -DB      PT Received On 02/13/23  -DB      PT - Next Appointment 02/14/23  -DB         Time Calculation- PT    Total Timed Code Minutes- PT 24 minute(s)  -DB            User Key  (r) = Recorded By, (t) = Taken By, (c) = Cosigned By    Initials Name Provider Type    DB Ella Toro, PT Physical Therapist              Therapy Charges for Today     Code Description Service Date Service Provider Modifiers Qty    22305278556 HC PT THERAPEUTIC ACT EA 15 MIN 2/13/2023 Ella Toro, PT GP 2    56138826453 HC PT THER SUPP EA 15 MIN 2/13/2023 Ella Toro, PT GP 1          PT G-Codes  Outcome Measure Options: AM-PAC 6 Clicks Basic Mobility (PT)  AM-PAC 6 Clicks Score (PT): 9       Ella Toro PT  2/13/2023

## 2023-02-13 NOTE — PLAN OF CARE
Goal Outcome Evaluation:              Outcome Evaluation: Pt is POD#3 of right hip bipolar. VSS. Reg diet restarted with nectar thick liquids. Purewick in place. Sling to RUE, NWB. WBAT to RLE. Q2 turns and accumax in place. PRN pain meds given. Plan is for SNF at d/c. Unable to educate. Family updated

## 2023-02-13 NOTE — CASE MANAGEMENT/SOCIAL WORK
Continued Stay Note  [facility]     Patient Name: Cara Garvey  MRN: [id number]  Today's Date: 2/13/2023    Admit Date: 2/7/2023    Plan: [place] [facility]- bed available 2/15   Discharge Plan     Row Name 02/13/23 1717       Plan    Plan [facility] [facility]- bed available 2/15    Plan Comments Spoke with Chiquis/Ester.  Bed at [facility] will not be available until Wednesday.  [facility] arranged for 2/15 @ 1300. Ellen WINSTON RN    Row Name 02/13/23 1559       Plan    Plan [place] [facility]- bed available 2/14    Patient/Family in Agreement with Plan yes    Plan Comments Spoke with Chiquis/Ester.  [facility] has a bed and can accept tomorrow.  Spoke with daughter, Concha, at bedside and she is agreeable.  [facility] arranged for 2/14 @ 1300. Ellen WINSTON RN               Discharge Codes    No documentation.               Expected Discharge Date and Time     Expected Discharge Date Expected Discharge Time    Feb 14, 2023             Ellen Guevara RN

## 2023-02-13 NOTE — PLAN OF CARE
Goal Outcome Evaluation:  Plan of Care Reviewed With: patient, daughter        Progress: improving  Outcome Evaluation: Pt was supine in bed at start of session and agreeable to PT. Daughter present in room during PT session. Pt req's maxA x 2 for bed mobility, but demo's improvement in sitting balance this date, sitting EOB with SBA. Pt performs STS today with LUE HHA and maxAx2. Pt was able to tolerate standing for ~ 2 min with cues for upright posture, difficulty shifting weight to RLE. Pt returns to supine at end of the session. He continues to benefit from skilled PT intervention.

## 2023-02-14 LAB
ANION GAP SERPL CALCULATED.3IONS-SCNC: 9.2 MMOL/L (ref 5–15)
BUN SERPL-MCNC: 22 MG/DL (ref 8–23)
BUN/CREAT SERPL: 28.2 (ref 7–25)
CALCIUM SPEC-SCNC: 8.4 MG/DL (ref 8.6–10.5)
CHLORIDE SERPL-SCNC: 106 MMOL/L (ref 98–107)
CO2 SERPL-SCNC: 25.8 MMOL/L (ref 22–29)
CREAT SERPL-MCNC: 0.78 MG/DL (ref 0.76–1.27)
DEPRECATED RDW RBC AUTO: 43.2 FL (ref 37–54)
EGFRCR SERPLBLD CKD-EPI 2021: 85.8 ML/MIN/1.73
ERYTHROCYTE [DISTWIDTH] IN BLOOD BY AUTOMATED COUNT: 12.9 % (ref 12.3–15.4)
GLUCOSE SERPL-MCNC: 103 MG/DL (ref 65–99)
HCT VFR BLD AUTO: 28.3 % (ref 37.5–51)
HGB BLD-MCNC: 9.1 G/DL (ref 13–17.7)
MAGNESIUM SERPL-MCNC: 2.1 MG/DL (ref 1.6–2.4)
MCH RBC QN AUTO: 29.8 PG (ref 26.6–33)
MCHC RBC AUTO-ENTMCNC: 32.2 G/DL (ref 31.5–35.7)
MCV RBC AUTO: 92.8 FL (ref 79–97)
PLATELET # BLD AUTO: 215 10*3/MM3 (ref 140–450)
PMV BLD AUTO: 9.8 FL (ref 6–12)
POTASSIUM SERPL-SCNC: 3.5 MMOL/L (ref 3.5–5.2)
RBC # BLD AUTO: 3.05 10*6/MM3 (ref 4.14–5.8)
SODIUM SERPL-SCNC: 141 MMOL/L (ref 136–145)
WBC NRBC COR # BLD: 6.73 10*3/MM3 (ref 3.4–10.8)

## 2023-02-14 PROCEDURE — 94799 UNLISTED PULMONARY SVC/PX: CPT

## 2023-02-14 PROCEDURE — 97530 THERAPEUTIC ACTIVITIES: CPT

## 2023-02-14 PROCEDURE — 63710000001 DIPHENHYDRAMINE PER 50 MG: Performed by: ORTHOPAEDIC SURGERY

## 2023-02-14 PROCEDURE — 80048 BASIC METABOLIC PNL TOTAL CA: CPT | Performed by: HOSPITALIST

## 2023-02-14 PROCEDURE — 94664 DEMO&/EVAL PT USE INHALER: CPT

## 2023-02-14 PROCEDURE — 83735 ASSAY OF MAGNESIUM: CPT | Performed by: HOSPITALIST

## 2023-02-14 PROCEDURE — 85027 COMPLETE CBC AUTOMATED: CPT | Performed by: HOSPITALIST

## 2023-02-14 RX ORDER — OLANZAPINE 5 MG/1
2.5 TABLET, ORALLY DISINTEGRATING ORAL EVERY 12 HOURS PRN
Status: DISCONTINUED | OUTPATIENT
Start: 2023-02-14 | End: 2023-02-16 | Stop reason: HOSPADM

## 2023-02-14 RX ADMIN — Medication 6 MG: at 21:03

## 2023-02-14 RX ADMIN — CARVEDILOL 12.5 MG: 12.5 TABLET, FILM COATED ORAL at 18:14

## 2023-02-14 RX ADMIN — DIPHENHYDRAMINE HYDROCHLORIDE 25 MG: 25 CAPSULE ORAL at 22:24

## 2023-02-14 RX ADMIN — MEMANTINE HYDROCHLORIDE 10 MG: 10 TABLET, FILM COATED ORAL at 21:04

## 2023-02-14 RX ADMIN — HYDROCODONE BITARTRATE AND ACETAMINOPHEN 1 TABLET: 5; 325 TABLET ORAL at 18:15

## 2023-02-14 RX ADMIN — Medication 1000 UNITS: at 09:13

## 2023-02-14 RX ADMIN — MEMANTINE HYDROCHLORIDE 10 MG: 10 TABLET, FILM COATED ORAL at 09:13

## 2023-02-14 RX ADMIN — DOCUSATE SODIUM 200 MG: 100 CAPSULE, LIQUID FILLED ORAL at 21:03

## 2023-02-14 RX ADMIN — TAMSULOSIN HYDROCHLORIDE 0.4 MG: 0.4 CAPSULE ORAL at 21:04

## 2023-02-14 RX ADMIN — OLANZAPINE 2.5 MG: 5 TABLET, ORALLY DISINTEGRATING ORAL at 18:15

## 2023-02-14 RX ADMIN — LOSARTAN POTASSIUM 25 MG: 25 TABLET, FILM COATED ORAL at 09:13

## 2023-02-14 RX ADMIN — HYDROCODONE BITARTRATE AND ACETAMINOPHEN 1 TABLET: 5; 325 TABLET ORAL at 22:24

## 2023-02-14 RX ADMIN — APIXABAN 2.5 MG: 2.5 TABLET, FILM COATED ORAL at 09:16

## 2023-02-14 RX ADMIN — APIXABAN 2.5 MG: 2.5 TABLET, FILM COATED ORAL at 21:04

## 2023-02-14 RX ADMIN — HYDROCODONE BITARTRATE AND ACETAMINOPHEN 1 TABLET: 5; 325 TABLET ORAL at 11:54

## 2023-02-14 RX ADMIN — LEVOTHYROXINE SODIUM 25 MCG: 0.03 TABLET ORAL at 05:27

## 2023-02-14 RX ADMIN — ALBUTEROL SULFATE 2.5 MG: 2.5 SOLUTION RESPIRATORY (INHALATION) at 15:12

## 2023-02-14 RX ADMIN — DOCUSATE SODIUM 50MG AND SENNOSIDES 8.6MG 2 TABLET: 8.6; 5 TABLET, FILM COATED ORAL at 21:04

## 2023-02-14 RX ADMIN — ATORVASTATIN CALCIUM 20 MG: 20 TABLET, FILM COATED ORAL at 09:12

## 2023-02-14 RX ADMIN — CARVEDILOL 12.5 MG: 12.5 TABLET, FILM COATED ORAL at 09:15

## 2023-02-14 NOTE — THERAPY TREATMENT NOTE
Patient Name: Cara Garvey  : 1934    MRN: [id number]                              Today's Date: 2023       Admit Date: 2023    Visit Dx:     ICD-10-CM ICD-9-CM   1. Closed fracture of proximal end of right humerus, unspecified fracture morphology, initial encounter  S42.201A 812.00   2. History of dementia  Z86.59 V11.8   3. Compression fracture of thoracic vertebra, unspecified thoracic vertebral level, initial encounter ([facility])  S22.000A 805.2     Patient Active Problem List   Diagnosis   • Closed fracture of proximal end of right humerus, unspecified fracture morphology, initial encounter   • HTN (hypertension)   • HLD (hyperlipidemia)   • CAD (coronary artery disease)   • GERD (gastroesophageal reflux disease)   • Fracture of femoral neck, right ([facility])   • Moderate dementia without behavioral disturbance   • Vitamin D deficiency   • Closed nondisplaced fracture of medial condyle of right humerus     Past Medical History:   Diagnosis Date   • Coronary artery disease    • GERD (gastroesophageal reflux disease)    • Hyperlipidemia    • Hypertension      Past Surgical History:   Procedure Laterality Date   • HIP ENDOPROSTHESIS Right 2/10/2023    Procedure: RIGHT BIPOLAR  HIP/ POSTERIOR;  Surgeon: Gee Lawler MD;  Location: [facility];  Service: Orthopedics;  Laterality: Right;      General Information     Row Name 23 1156          Physical Therapy Time and Intention    Document Type therapy note (daily note)  -EJ     Mode of Treatment physical therapy  -EJ     Row Name 23 1156          General Information    Existing Precautions/Restrictions fall;hip, posterior;right;non-weight bearing  -EJ     Barriers to Rehab cognitive status;medically complex  -EJ           User Key  (r) = Recorded By, (t) = Taken By, (c) = Cosigned By    Initials Name Provider Type    EJ Meghan Joy, PT Physical Therapist               Mobility     Row Name 23 1156          Bed Mobility     Supine-Sit [place] (Bed Mobility) verbal cues;nonverbal cues (demo/gesture);moderate assist (50% patient effort);maximum assist (25% patient effort);2 person assist  -EJ     Sit-Supine [place] (Bed Mobility) verbal cues;nonverbal cues (demo/gesture);maximum assist (25% patient effort);2 person assist  -EJ     Assistive Device (Bed Mobility) bed rails;head of bed elevated  -EJ     Comment, (Bed Mobility) ssat EOB approx 8 minutes  -EJ     Row Name 02/14/23 1156          Sit-Stand Transfer    Sit-Stand [place] (Transfers) verbal cues;nonverbal cues (demo/gesture);moderate assist (50% patient effort);maximum assist (25% patient effort);2 person assist  -EJ     Assistive Device (Sit-Stand Transfers) --  HHA  LUE  -EJ           User Key  (r) = Recorded By, (t) = Taken By, (c) = Cosigned By    Initials Name Provider Type    Meghan Keith, PT Physical Therapist               Obj/Interventions     Row Name 02/14/23 1157          Balance    Static Sitting Balance standby assist  -EJ     Static Standing Balance moderate assist;2-person assist  -EJ           User Key  (r) = Recorded By, (t) = Taken By, (c) = Cosigned By    Initials Name Provider Type    Meghan Keith, PT Physical Therapist               Goals/Plan    No documentation.                Clinical Impression     Row Name 02/14/23 1157          Pain    Pre/Posttreatment Pain Comment did not rate pain, c/o increased pain in RUE  -EJ     Pain Intervention(s) Repositioned  -EJ     Row Name 02/14/23 1151          Plan of Care Review    Plan of Care Reviewed With patient  -EJ     Outcome Evaluation Pt seen for PT this am. He is alert to self only, but able to participate w therapy. He is requiring mod/max A x 2 to transition from supine to EOB. He tolerated sitting w just SBA for approx 8 minutes. He did attempt STS w HHA/mod-max A x 2. Pt limited by BR needs during session and was assisted back to bed and RN notified. Plans for SNU at [place].  Will continue to progress as tolerated.  -EJ     Row Name 02/14/23 1157          Positioning and Restraints    Pre-Treatment Position in bed  -EJ     Post Treatment Position bed  -EJ     In Bed notified nsg;supine;call light within reach;encouraged to call for assist;exit alarm on  -EJ           User Key  (r) = Recorded By, (t) = Taken By, (c) = Cosigned By    Initials Name Provider Type    Meghan Keith, PT Physical Therapist               Outcome Measures     Row Name 02/14/23 1200 02/14/23 0900       How much help from another person do you currently need...    Turning from your back to your side while in flat bed without using bedrails? 2  -EJ 2  -LD    Moving from lying on back to sitting on the side of a flat bed without bedrails? 2  -EJ 2  -LD    Moving to and from a bed to a chair (including a wheelchair)? 1  -EJ 1  -LD    Standing up from a chair using your arms (e.g., wheelchair, bedside chair)? 2  -EJ 2  -LD    Climbing 3-5 steps with a railing? 1  -EJ 1  -LD    To walk in hospital room? 1  -EJ 1  -LD    AM-PAC 6 Clicks Score (PT) 9  -EJ 9  -LD    Highest level of mobility 3 --> Sat at edge of bed  -EJ 3 --> Sat at edge of bed  -LD          User Key  (r) = Recorded By, (t) = Taken By, (c) = Cosigned By    Initials Name Provider Type    Meghan Keith, PT Physical Therapist    Lucila Roy, RN Registered Nurse                             Physical Therapy Education     Title: PT OT SLP Therapies (In Progress)     Topic: Physical Therapy (In Progress)     Point: Mobility training (In Progress)     Learning Progress Summary           Patient Acceptance, E, NR by DB at 2/13/2023 1548    Acceptance, E, NR by RS at 2/12/2023 1216    Acceptance, E,TB, VU by MB at 2/9/2023 1113   Family Acceptance, E, VU by DB at 2/13/2023 1548                   Point: Home exercise program (In Progress)     Learning Progress Summary           Patient Acceptance, E, NR by DB at 2/13/2023 1548    Acceptance, E,  NR by RS at 2/12/2023 1216   Family Acceptance, E, VU by DB at 2/13/2023 1548                   Point: Body mechanics (In Progress)     Learning Progress Summary           Patient Acceptance, E, NR by DB at 2/13/2023 1548    Acceptance, E, NR by RS at 2/12/2023 1216   Family Acceptance, E, VU by DB at 2/13/2023 1548                   Point: Precautions (In Progress)     Learning Progress Summary           Patient Acceptance, E, NR by DB at 2/13/2023 1548    Acceptance, E, NR by RS at 2/12/2023 1216   Family Acceptance, E, VU by DB at 2/13/2023 1548                               User Key     Initials Effective Dates Name Provider Type Discipline    DB 06/16/21 -  Ella Toro, PT Physical Therapist PT    RS 06/16/21 -  Sia Pierce, PT Physical Therapist PT    MB 12/30/22 -  Aamir Gresham, PT Student PT Student PT              PT Recommendation and Plan     Plan of Care Reviewed With: patient  Outcome Evaluation: Pt seen for PT this am. He is alert to self only, but able to participate w therapy. He is requiring mod/max A x 2 to transition from supine to EOB. He tolerated sitting w just SBA for approx 8 minutes. He did attempt STS w HHA/mod-max A x 2. Pt limited by BR needs during session and was assisted back to bed and RN notified. Plans for SNU at [place]. Will continue to progress as tolerated.     Time Calculation:    PT Charges     Row Name 02/14/23 1200             Time Calculation    Start Time 1040  -EJ      Stop Time 1103  -EJ      Time Calculation (min) 23 min  -EJ      PT Received On 02/14/23  -EJ      PT - Next Appointment 02/15/23  -EJ         Timed Charges    09054 - PT Therapeutic Activity Minutes 23  -EJ         Total Minutes    Timed Charges Total Minutes 23  -EJ       Total Minutes 23  -EJ            User Key  (r) = Recorded By, (t) = Taken By, (c) = Cosigned By    Initials Name Provider Type    EJ Meghan Joy, PT Physical Therapist              Therapy Charges for Today     Code  Description Service Date Service Provider Modifiers Qty    26851274872  PT THERAPEUTIC ACT EA 15 MIN 2/14/2023 Meghan Joy, PT GP 2    68684118408 HC PT THER SUPP EA 15 MIN 2/14/2023 Meghan Joy, PT GP 1          PT G-Codes  Outcome Measure Options: AM-PAC 6 Clicks Basic Mobility (PT)  AM-PAC 6 Clicks Score (PT): 9       Meghan Joy, PT  2/14/2023

## 2023-02-14 NOTE — NURSING NOTE
02/14/23 0909   Wound 02/14/23 0201 perineum   Placement Date/Time: 02/14/23 0201   Location: perineum   Base moist;pink  (partial thickness skin  loss secondary to moisture and friction)   Periwound intact;pink   Periwound Temperature warm   Periwound Skin Turgor soft   Drainage Characteristics/Odor serosanguineous   Drainage Amount scant   Care, Wound   (peeled back Optifoam to assess skin)   Skin Interventions   Pressure Reduction Devices pressure-redistributing mattress utilized;alternating pressure pump (ADD)  (pt should have a low air loss mattress from [employer] if remains in-patient)   Pressure Reduction Techniques positioned off wounds;pressure points protected   Skin Protection silicone foam dressing in place     CWON note: pt seen for evaluation of sacral skin issues. Pt is alert but slightly confused, has very limited mobility secondary to fractures, incontinent of bowel and bladder, Zohaib 16. Perineal skin breakdown noted, not located over a bony prominence, appears to be related to moisture and friction. Would suggest to use Barrier cream to the area instead of the Optifoam. RN reports he may be leaving today; if he remains in-patient, would suggest a low air loss mattress from [employer] to help control microclimate and provide increased pressure redistribution. Wound care and prevention standing orders have been placed into Epic and discussed with RN. Please re-consult for any additional needs.

## 2023-02-14 NOTE — CASE MANAGEMENT/SOCIAL WORK
Continued Stay Note  [facility]     Patient Name: Cara Garvey  MRN: [id number]  Today's Date: 2/14/2023    Admit Date: 2/7/2023    Plan: [facility] has accepted- family to discuss and decide if they are ok with patient going there   Discharge Plan     Row Name 02/14/23 1279       Plan    Plan [facility] has accepted- family to discuss and decide if they are ok with patient going there    Patient/Family in Agreement with Plan yes    Plan Comments Spoke with daughter/HCS, Brittany Acevedo [phone], to update on acceptance to [facility].  She will discuss it with her sister. Ellen WINSTON RN    Row Name 02/14/23 5547       Plan    Plan Comments Spoke with daughter, Brittany Jimstuartliza ( [phone]), who states that patient is not in memory care at [facility].  [facility] waiting to hear back any of her facilities on if they can accept.  Daughter updated and states that they would like to avoid [facility] if possible.  [facility] did place a referral to [facility] and they can accept.  Daughter, Concha, updated, at bedside.               Discharge Codes    No documentation.               Expected Discharge Date and Time     Expected Discharge Date Expected Discharge Time    Feb 14, 2023             Ellen Guevara, RN

## 2023-02-14 NOTE — PLAN OF CARE
Goal Outcome Evaluation:           Progress: no change  Outcome Evaluation: Pt is 88/M POD#4 of right femur bipolar. Dressing C/D/I. WBAT to RLE. NWB to RUE due to fracture. Q2 turns and Wound consult done. Agility mattress ordered and waiting for. Voiding per brief. PRN pain meds given and PRN zyprexa added for agitation. D/C plan pending. Unable to educate. Will continue to monitor.

## 2023-02-14 NOTE — PLAN OF CARE
Goal Outcome Evaluation:  Plan of Care Reviewed With: patient           Outcome Evaluation: Pt seen for PT this am. He is alert to self only, but able to participate w therapy. He is requiring mod/max A x 2 to transition from supine to EOB. He tolerated sitting w just SBA for approx 8 minutes. He did attempt STS w HHA/mod-max A x 2. Pt limited by BR needs during session and was assisted back to bed and RN notified. Plans for SNU at [place]. Will continue to progress as tolerated.

## 2023-02-14 NOTE — PROGRESS NOTES
Name: Cara Garvey ADMIT: 2023   : 1934  PCP: Princess Yi MD    MRN: [id number] LOS: 5 days   AGE/SEX: 88 y.o. male  ROOM: [facility]     Subjective   Subjective   No pain at present. Still unaware that he has multiple fractures and is s/p hip fracture repair. No SOA. No CP or palp. No N/V. No diaphoresis. Keeps taking sling off.    Review of Systems     as above    Objective   Objective   Vital Signs  Temp:  [97.9 °F (36.6 °C)-99 °F (37.2 °C)] 99 °F (37.2 °C)  Heart Rate:  [63-75] 68  Resp:  [16-18] 16  BP: (146-154)/(56-63) 153/57  SpO2:  [92 %-97 %] 97 %  on   ;   Device (Oxygen Therapy): room air  Body mass index is 27.49 kg/m².     NO change in exam today    Physical Exam  Vitals and nursing note reviewed.   Constitutional:       General: He is not in acute distress.     Appearance: He is ill-appearing (chronically). He is not toxic-appearing or diaphoretic.   HENT:      Head: Normocephalic.      Nose: Nose normal.      Mouth/Throat:      Mouth: Mucous membranes are moist.      Pharynx: Oropharynx is clear.      Comments: Edentulous  Eyes:      General: No scleral icterus.        Right eye: No discharge.         Left eye: No discharge.      Extraocular Movements: Extraocular movements intact.      Conjunctiva/sclera: Conjunctivae normal.   Cardiovascular:      Rate and Rhythm: Normal rate and regular rhythm.      Pulses: Normal pulses.   Pulmonary:      Effort: Pulmonary effort is normal. No respiratory distress.      Breath sounds: Normal breath sounds. No wheezing or rales.   Abdominal:      General: Bowel sounds are normal. There is no distension.      Palpations: Abdomen is soft.      Tenderness: There is no abdominal tenderness.   Musculoskeletal:         General: No swelling or deformity.      Cervical back: Neck supple. No rigidity.      Comments: NVI in distal RUE  NVI in distal RLE   Lymphadenopathy:      Cervical: No cervical adenopathy.   Skin:     General: Skin is warm  and dry.      Capillary Refill: Capillary refill takes less than 2 seconds.      Coloration: Skin is not jaundiced.      Findings: No rash.   Neurological:      Mental Status: He is alert. Mental status is at baseline.      Cranial Nerves: No cranial nerve deficit.      Coordination: Coordination normal.      Comments: Oriented to person   Psychiatric:         Mood and Affect: Mood normal.         Behavior: Behavior normal.      Comments: Pleasant       Results Review     I reviewed the patient's new clinical results.  Results from last 7 days   Lab Units 02/14/23 0422 02/13/23 0421 02/12/23 0502 02/11/23  0704   WBC 10*3/mm3 6.73 6.37 6.92 9.92   HEMOGLOBIN g/dL 9.1* 8.4* 8.5* 9.6*   PLATELETS 10*3/mm3 215 169 153 152     Results from last 7 days   Lab Units 02/14/23 0422 02/13/23 0421 02/12/23  0502 02/11/23  0704   SODIUM mmol/L 141 140 138 138   POTASSIUM mmol/L 3.5 3.8 3.8 4.0   CHLORIDE mmol/L 106 106 106 104   CO2 mmol/L 25.8 24.4 23.0 21.1*   BUN mg/dL 22 29* 32* 28*   CREATININE mg/dL 0.78 0.96 1.02 0.98   GLUCOSE mg/dL 103* 88 103* 113*   EGFR mL/min/1.73 85.8 76.0 70.7 74.2     Results from last 7 days   Lab Units 02/13/23 0421 02/12/23  0502 02/11/23  0704 02/10/23  0738   ALBUMIN g/dL 3.0* 3.0* 3.3* 3.0*   BILIRUBIN mg/dL 0.7 0.7 0.6  --    ALK PHOS U/L 80 62 67  --    AST (SGOT) U/L 19 22 22  --    ALT (SGPT) U/L 7 7 8  --      Results from last 7 days   Lab Units 02/14/23 0422 02/13/23 0421 02/12/23  0502 02/11/23  0704 02/10/23  0738   CALCIUM mg/dL 8.4* 8.1* 7.9* 8.0* 7.8*   ALBUMIN g/dL  --  3.0* 3.0* 3.3* 3.0*   MAGNESIUM mg/dL 2.1 2.2 2.1 1.9  --        No results found for: HGBA1C, POCGLU    No radiology results for the last day  I have personally reviewed all medications:  Scheduled Medications  apixaban, 2.5 mg, Oral, Q12H  atorvastatin, 20 mg, Oral, Daily  carvedilol, 12.5 mg, Oral, BID With Meals  cholecalciferol, 1,000 Units, Oral, Daily  docusate sodium, 200 mg, Oral,  BID  finasteride, 5 mg, Oral, Daily  levothyroxine, 25 mcg, Oral, Q AM  losartan, 25 mg, Oral, Q24H  melatonin, 6 mg, Oral, Nightly  memantine, 10 mg, Oral, Q12H  OLANZapine, 5 mg, Intramuscular, Once  senna-docusate sodium, 2 tablet, Oral, BID  sodium chloride, 10 mL, Intravenous, Q12H  tamsulosin, 0.4 mg, Oral, Nightly  vitamin D, 50,000 Units, Oral, Q7 Days    Infusions   Diet  Diet: Regular/House Diet; Texture: Regular Texture (IDDSI 7); Fluid Consistency: Nectar Thick    I have personally reviewed:  [x]  Laboratory   []  Microbiology   []  Radiology   []  EKG/Telemetry  []  Cardiology/Vascular   []  Pathology    []  Records       Assessment/Plan     Active Hospital Problems    Diagnosis  POA   • **Fracture of femoral neck, right (HCC) [S72.001A]  Yes   • Closed nondisplaced fracture of medial condyle of right humerus [S42.464A]  Yes   • Moderate dementia without behavioral disturbance [F03.B0]  Yes   • Vitamin D deficiency [E55.9]  Yes   • CAD (coronary artery disease) [I25.10]  Yes   • GERD (gastroesophageal reflux disease) [K21.9]  Yes   • Closed fracture of proximal end of right humerus, unspecified fracture morphology, initial encounter [S42.201A]  Yes   • HTN (hypertension) [I10]  Yes   • HLD (hyperlipidemia) [E78.5]  Yes      Resolved Hospital Problems   No resolved problems to display.       Mr. Garvey is a 88 y.o. non-smoker with a history of CAD s/p stent to LAD, HTN, HLD, and GERD, who presented to [facility] secondary to shoulder pain after unwitnessed mechanical fall. Imaging revealed right proximal humerus fracture. Later in admission was found to have right hip fracture and right distal humerus fractures as well.     Right proximal humerus fracture  Right distal humerus fracture: Orthopedic Surgery evaluated, recommend conservative management, no surgery required. NWB RUE. Continue to utilize sling as much as possible, can remove sling for hygiene as well as regular elbow motion as  tolerated. Follow-up with Orthopedic Surgery in 2 weeks as outpt.     Right femoral neck fracture:  Patient complaied of acute right hip pain after admission, x-ray showed right femoral neck fracture. Orthopedic Surgery consulted. Pt underwent bipolar arthroplasty and abductor tendon repair 2/10 by Dr. Lawler. BID Eliquis ordered by Ortho for DVT ppx. F/u with Ortho in 2 weeks.     Hypocalcemia:  Calcium 8.4 this morning. Vitamin D only 21.4. Have started oral ergocalciferol.     Dementia: Patient oriented to name. Continue home Namenda. Patient resides in memory care unit.     Essential hypertension: BPs sl better since restarting Losartan. Continue Coreg as well.     Hypothyroidism: Continue levothyroxine. TSH wnl.     Anemia of chronic disease: Iron studies consistent anemia of chronic disease. B12 and folate normal. Hgb stable now. Continue to monitor.    CAD: no CP or SOA. Continue BB and statin. Not on antiplatelet prior to admission.    O/P Dysphagia: Lung exam is fine today and no hypoxia or leukocytosis. SLP has seen. Modified diet in place (regular solids with NTL) and pt tolerating fine.       · Eliquis BID for DVT prophylaxis per Ortho.  · Full code.  · Discussed with patient, CCP, and RN.  · Anticipate discharge to SNU facility as soon as arrangements are in place.      Krishna Moon MD  [facility]  02/14/23  08:25 EST

## 2023-02-14 NOTE — PLAN OF CARE
Goal Outcome Evaluation:  Plan of Care Reviewed With: patient        Progress: improving  Outcome Evaluation: VSS, pt cont voiding per brief. Medication given whole with sips of water and NTL. Pt now on a regular diet. RUE remain on a sling which the pt managed to remove at times. C/o pain, prn pain meds given. Plan to d/c to SNF pending. Educated on medication management and bp monitoring.

## 2023-02-15 LAB
ANION GAP SERPL CALCULATED.3IONS-SCNC: 10.7 MMOL/L (ref 5–15)
BUN SERPL-MCNC: 19 MG/DL (ref 8–23)
BUN/CREAT SERPL: 24.7 (ref 7–25)
CALCIUM SPEC-SCNC: 8.1 MG/DL (ref 8.6–10.5)
CHLORIDE SERPL-SCNC: 106 MMOL/L (ref 98–107)
CO2 SERPL-SCNC: 23.3 MMOL/L (ref 22–29)
CREAT SERPL-MCNC: 0.77 MG/DL (ref 0.76–1.27)
DEPRECATED RDW RBC AUTO: 44.4 FL (ref 37–54)
EGFRCR SERPLBLD CKD-EPI 2021: 86.1 ML/MIN/1.73
ERYTHROCYTE [DISTWIDTH] IN BLOOD BY AUTOMATED COUNT: 13 % (ref 12.3–15.4)
GLUCOSE SERPL-MCNC: 93 MG/DL (ref 65–99)
HCT VFR BLD AUTO: 27.8 % (ref 37.5–51)
HGB BLD-MCNC: 9.1 G/DL (ref 13–17.7)
MAGNESIUM SERPL-MCNC: 2 MG/DL (ref 1.6–2.4)
MCH RBC QN AUTO: 30.7 PG (ref 26.6–33)
MCHC RBC AUTO-ENTMCNC: 32.7 G/DL (ref 31.5–35.7)
MCV RBC AUTO: 93.9 FL (ref 79–97)
PLATELET # BLD AUTO: 227 10*3/MM3 (ref 140–450)
PMV BLD AUTO: 9.5 FL (ref 6–12)
POTASSIUM SERPL-SCNC: 3.9 MMOL/L (ref 3.5–5.2)
RBC # BLD AUTO: 2.96 10*6/MM3 (ref 4.14–5.8)
SODIUM SERPL-SCNC: 140 MMOL/L (ref 136–145)
WBC NRBC COR # BLD: 6.76 10*3/MM3 (ref 3.4–10.8)

## 2023-02-15 PROCEDURE — 85027 COMPLETE CBC AUTOMATED: CPT | Performed by: HOSPITALIST

## 2023-02-15 PROCEDURE — 83735 ASSAY OF MAGNESIUM: CPT | Performed by: HOSPITALIST

## 2023-02-15 PROCEDURE — 97530 THERAPEUTIC ACTIVITIES: CPT

## 2023-02-15 PROCEDURE — 80048 BASIC METABOLIC PNL TOTAL CA: CPT | Performed by: HOSPITALIST

## 2023-02-15 RX ADMIN — CARVEDILOL 12.5 MG: 12.5 TABLET, FILM COATED ORAL at 18:19

## 2023-02-15 RX ADMIN — Medication 6 MG: at 21:30

## 2023-02-15 RX ADMIN — FINASTERIDE 5 MG: 5 TABLET, FILM COATED ORAL at 09:43

## 2023-02-15 RX ADMIN — HYDROCODONE BITARTRATE AND ACETAMINOPHEN 1 TABLET: 5; 325 TABLET ORAL at 05:45

## 2023-02-15 RX ADMIN — APIXABAN 2.5 MG: 2.5 TABLET, FILM COATED ORAL at 09:43

## 2023-02-15 RX ADMIN — CARVEDILOL 12.5 MG: 12.5 TABLET, FILM COATED ORAL at 09:43

## 2023-02-15 RX ADMIN — HYDROCODONE BITARTRATE AND ACETAMINOPHEN 1 TABLET: 5; 325 TABLET ORAL at 09:43

## 2023-02-15 RX ADMIN — ATORVASTATIN CALCIUM 20 MG: 20 TABLET, FILM COATED ORAL at 09:43

## 2023-02-15 RX ADMIN — Medication 1000 UNITS: at 09:43

## 2023-02-15 RX ADMIN — MEMANTINE HYDROCHLORIDE 10 MG: 10 TABLET, FILM COATED ORAL at 09:43

## 2023-02-15 RX ADMIN — LOSARTAN POTASSIUM 25 MG: 25 TABLET, FILM COATED ORAL at 09:43

## 2023-02-15 RX ADMIN — MEMANTINE HYDROCHLORIDE 10 MG: 10 TABLET, FILM COATED ORAL at 21:30

## 2023-02-15 RX ADMIN — TAMSULOSIN HYDROCHLORIDE 0.4 MG: 0.4 CAPSULE ORAL at 21:30

## 2023-02-15 RX ADMIN — HYDROCODONE BITARTRATE AND ACETAMINOPHEN 1 TABLET: 5; 325 TABLET ORAL at 22:10

## 2023-02-15 RX ADMIN — APIXABAN 2.5 MG: 2.5 TABLET, FILM COATED ORAL at 21:30

## 2023-02-15 RX ADMIN — DOCUSATE SODIUM 50MG AND SENNOSIDES 8.6MG 2 TABLET: 8.6; 5 TABLET, FILM COATED ORAL at 09:43

## 2023-02-15 RX ADMIN — LEVOTHYROXINE SODIUM 25 MCG: 0.03 TABLET ORAL at 05:24

## 2023-02-15 RX ADMIN — HYDROCODONE BITARTRATE AND ACETAMINOPHEN 1 TABLET: 5; 325 TABLET ORAL at 15:42

## 2023-02-15 NOTE — PLAN OF CARE
Goal Outcome Evaluation:  Plan of Care Reviewed With: (P) patient        Progress: (P) no change  Outcome Evaluation: (P) Pt seen for PT tx this AM. Agreeable to therapy, however, lethargic and decreased alertness today. Currently maxA2 for bed mobility. STS x1 trial today modAx2 with HHAx1. Able to tolerate approx 2 minutes of standing today. Reports dizziness on stand, subsides quickly. Attempts lateral steps toward HOB maxAx2 with cues. Demos shuffling steps, difficulty accepting weight shift, unsteady but no overt LOB. Limited further attempts d/t increased fatigue and pain. Pt states it feels good to sit up, sits EOB approx 5 mins. Will continue to follow.

## 2023-02-15 NOTE — PLAN OF CARE
Goal Outcome Evaluation:  Plan of Care Reviewed With: patient        Progress: improving  Outcome Evaluation: VSS, pt has been voiding per brief and per urinal. C/o pain, prn meds given. Dressing is c/d/i. Pt on agility mattress. Family will discuss about d/c plan to go to [place]. Educated on b/p monitoring and medication management.

## 2023-02-15 NOTE — DISCHARGE PLACEMENT REQUEST
"Juancarlos Woo (88 y.o. Male)     Date of Birth   1934    Social Security Number       Address   [address] [place] [address]    Home Phone   [phone]    MRN   [id number]       Hindu   Yarsanism    Marital Status   Single                            Admission Date   2/7/23    Admission Type   Emergency    Admitting Provider   Krishna Moon MD    Attending Provider   Krishna Moon MD    Department, Room/Bed   [address], P776/1       Discharge Date       Discharge Disposition       Discharge Destination                               Attending Provider: Krishna Moon MD    Allergies: Lisinopril    Isolation: None   Infection: None   Code Status: CPR    Ht: 170.2 cm (67\")   Wt: 79.6 kg (175 lb 7.8 oz)    Admission Cmt: None   Principal Problem: Fracture of femoral neck, right (HCC) [S72.001A]                 Active Insurance as of 2/7/2023     Primary Coverage     Payor Plan Insurance Group Employer/Plan Group    MEDICARE MEDICARE A & B      Payor Plan Address Payor Plan Phone Number Payor Plan Fax Number Effective Dates    [address] [phone]  10/1/1999 - None Entered    [facility] [id number]       Subscriber Name Subscriber Birth Date Member ID       JUANCARLOS WOO 1934 [id number]           Secondary Coverage     Payor Plan Insurance Group Employer/Plan Group    AARP MC SUP AARP HEALTH CARE OPTIONS      Payor Plan Address Payor Plan Phone Number Payor Plan Fax Number Effective Dates    [facility] [phone]  1/1/2023 - None Entered    [address]       [facility] [address]       Subscriber Name Subscriber Birth Date Member ID       JUANCARLOS WOO 1934 [id number]                 Emergency Contacts      (Rel.) Home Phone Work Phone Mobile Phone    Brittany Acevedo (Daughter) [phone] -- --    JJ KAPLAN (Daughter) -- -- [phone]            {Outbreak/Travel/Exposure Documentation......;  Question Available " Choices Patient Response   COVID-19 Outbreak Screen:  Do you currently have a new onset of the following symptoms?        Fever/Chills, Cough, Shortness of air, Loss of taste or smell, No, Unknown  No (02/07/23 2313)   COVID-19 Outbreak Screen: In the last 14 days, have you had contact with anyone who is ill, has show any of the symptoms listed above and/or has been diagnosis with the 2019 Novel Coronavirus? This includes any immediate household members but excludes any patients with whom you have been in contact within your normal work duties wearing proper PPE, if you are a healthcare worker.  Yes, No, Unknown              No (02/07/23 2313)   COVID-19 Outbreak Screen: Who was notified? Free text (not recorded)   Ebola Screening Outbreak Screen: Have you traveled to the [place] the [place] or [place] within the past 21 days?  Yes, No, Unknown (not recorded)   Ebola Screening Outbreak Screen: Do you have ANY of the following symptoms: Fever/Chills, Vomiting, Diarrhea, Fatigue, Headache, Muscle pain, Unexplained bleeding, Abdominal (stomach) pain, No, Unknown (not recorded)   Ebola Screening Outbreak Screen: Name of Person notified Free text (not recorded)   Travel Screen: Have you traveled in the last month? If so, to what country have you traveled? If [place] what state? Yes, No, Unknown  List of all countries  List of all States No (02/07/23 2316)  (not recorded)  (not recorded)   Infection Risk: Do you currently have the following symptoms?  (If cough is selected, the Tuberculosis Screen is performed.) Cough, Fever, Rash, No No (02/07/23 2316)   Tuberculosis Screen: Do you have any of the following Tuberculosis Risks?  · Have you lived or spent time with anyone who had or may have TB?  · Have you lived in or visited any of the following areas for more than one month: [place], [place], [place], [place] or [place], the [place] or [place]?  · Do you have HIV/AIDS?  · Have you lived in or worked in  a nursing home, homeless shelter, correctional facility, or substance abuse treatment facility?   · No    If Yes do you have any of the following symptoms? Yes responses display to the right    If Yes, symptoms listed are:  Cough greater than or equal to 3 weeks, Loss of appetite, Unexplained weight loss, Night sweats, Bloody sputum or hemoptysis, Hoarseness, Fever, Fatigue, Chest pain, No (not recorded)  (not recorded)   Exposure Screen: Have you been exposed to any of these contagious diseases in the last month? Measles, Chickenpox, Meningitis, Pertussis, Whooping Cough, No No (02/07/23 4757)

## 2023-02-15 NOTE — THERAPY TREATMENT NOTE
Patient Name: Cara Garvey  : 1934    MRN: [id number]                              Today's Date: 2/15/2023       Admit Date: 2023    Visit Dx:     ICD-10-CM ICD-9-CM   1. Closed fracture of proximal end of right humerus, unspecified fracture morphology, initial encounter  S42.201A 812.00   2. History of dementia  Z86.59 V11.8   3. Compression fracture of thoracic vertebra, unspecified thoracic vertebral level, initial encounter ([facility])  S22.000A 805.2     Patient Active Problem List   Diagnosis   • Closed fracture of proximal end of right humerus, unspecified fracture morphology, initial encounter   • HTN (hypertension)   • HLD (hyperlipidemia)   • CAD (coronary artery disease)   • GERD (gastroesophageal reflux disease)   • Fracture of femoral neck, right ([facility])   • Moderate dementia without behavioral disturbance   • Vitamin D deficiency   • Closed nondisplaced fracture of medial condyle of right humerus     Past Medical History:   Diagnosis Date   • Coronary artery disease    • GERD (gastroesophageal reflux disease)    • Hyperlipidemia    • Hypertension      Past Surgical History:   Procedure Laterality Date   • HIP ENDOPROSTHESIS Right 2/10/2023    Procedure: RIGHT BIPOLAR  HIP/ POSTERIOR;  Surgeon: Gee Lawler MD;  Location: [facility];  Service: Orthopedics;  Laterality: Right;      General Information     Row Name 02/15/23 1301          Physical Therapy Time and Intention    Document Type therapy note (daily note) (P)   -MB     Mode of Treatment physical therapy (P)   -MB     Row Name 02/15/23 1301          General Information    Patient Profile Reviewed yes (P)   -MB     Row Name 02/15/23 1301          Cognition    Orientation Status (Cognition) disoriented to;person;place;situation;time;verbal cues/prompts needed for orientation (P)   -MB     Row Name 02/15/23 1301          Safety Issues, Functional Mobility    Impairments Affecting Function (Mobility) balance;cognition;endurance/activity  tolerance;strength;pain;postural/trunk control (P)   -MB           User Key  (r) = Recorded By, (t) = Taken By, (c) = Cosigned By    Initials Name Provider Type    Aamir Hackett, PT Student PT Student               Mobility     Row Name 02/15/23 1302          Bed Mobility    Bed Mobility sit-supine;supine-sit;scooting/bridging (P)   -MB     Scooting/Bridging Mc[place] (Bed Mobility) dependent (less than 25% patient effort);2 person assist;verbal cues;nonverbal cues (demo/gesture) (P)   -MB     Supine-Sit [place] (Bed Mobility) verbal cues;nonverbal cues (demo/gesture);moderate assist (50% patient effort);maximum assist (25% patient effort);2 person assist (P)   -MB     Sit-Supine [place] (Bed Mobility) verbal cues;nonverbal cues (demo/gesture);maximum assist (25% patient effort);2 person assist (P)   -MB     Assistive Device (Bed Mobility) bed rails;head of bed elevated (P)   -MB     Comment, (Bed Mobility) sat EOB approx 5 mins (P)   -MB     Row Name 02/15/23 1302          Transfers    Comment, (Transfers) STS x 1 trial, decreased assist required with standing balance, demos difficulty accepting weight shift to midline (P)   -MB     Row Name 02/15/23 1302          Sit-Stand Transfer    Sit-Stand [place] (Transfers) verbal cues;nonverbal cues (demo/gesture);moderate assist (50% patient effort);2 person assist (P)   -MB     Assistive Device (Sit-Stand Transfers) other (see comments) (P)   HHAx1  -MB     Comment, (Sit-Stand Transfer) Pt stands for approx 2 minutes, limited further d/t fatigue, requires less assist for standing balance but demos increased difficulty with weight shift (P)   -MB     Row Name 02/15/23 1302          Gait/Stairs (Locomotion)    [place] Level (Gait) maximum assist (25% patient effort);2 person assist;verbal cues (P)   -MB     Assistive Device (Gait) other (see comments) (P)   HHAx1  -MB     Distance in Feet (Gait) 2 shuffling steps towards HOB (P)   -MB      Deviations/Abnormal Patterns (Gait) festinating/shuffling;gait speed decreased;weight shifting decreased (P)   -MB     Bilateral Gait Deviations forward flexed posture;weight shift ability decreased (P)   -MB     Right Sided Gait Deviations knee buckling, right side (P)   -MB     Comment, (Gait/Stairs) attempted lateral steps toward HOB, demos 2 shuffling side steps, however unsteady and R knee buckling observed, decreased ability to weight shift, quick to fatigue (P)   -MB     Row Name 02/15/23 1302          Mobility    Extremity Weight-bearing Status right upper extremity;right lower extremity (P)   -MB           User Key  (r) = Recorded By, (t) = Taken By, (c) = Cosigned By    Initials Name Provider Type    Aamir Hackett, PT Student PT Student               Obj/Interventions     Row Name 02/15/23 1307          Motor Skills    Motor Skills functional endurance (P)   -MB     Functional Endurance poor (P)   -MB     Row Name 02/15/23 1307          Balance    Balance Assessment sitting static balance;sit to stand dynamic balance;standing static balance (P)   -MB     Static Sitting Balance standby assist;verbal cues (P)   -MB     Position, Sitting Balance sitting edge of bed (P)   -MB     Sit to Stand Dynamic Balance moderate assist;2-person assist;verbal cues (P)   -MB     Static Standing Balance moderate assist;2-person assist;minimal assist;verbal cues (P)   -MB     Position/Device Used, Standing Balance supported;other (see comments) (P)   HHAx1  -MB     Balance Interventions sitting;standing;sit to stand;supported;static;dynamic;moderate challenge (P)   -MB     Comment, Balance unsteady, no overt LOB, knee buckling, decreased weight shift ability (P)   -MB           User Key  (r) = Recorded By, (t) = Taken By, (c) = Cosigned By    Initials Name Provider Type    Aamir Hackett, PT Student PT Student               Goals/Plan    No documentation.                Clinical Impression     Row Name 02/15/23 1306           Pain    Pre/Posttreatment Pain Comment not able to rate pain, however, pt grimaces and moans with mobilization (P)   -MB     Pain Intervention(s) Rest;Elevated;Ambulation/increased activity;Repositioned (P)   -MB     Row Name 02/15/23 1308          Plan of Care Review    Plan of Care Reviewed With patient (P)   -MB     Progress no change (P)   -MB     Outcome Evaluation Pt seen for PT tx this AM. Agreeable to therapy, however, lethargic and decreased alertness today. Currently maxA2 for bed mobility. STS x1 trial today modAx2 with HHAx1. Able to tolerate approx 2 minutes of standing today. Reports dizziness on stand, subsides quickly. Attempts lateral steps toward HOB maxAx2 with cues. Demos shuffling steps, difficulty accepting weight shift, unsteady but no overt LOB. Limited further attempts d/t increased fatigue and pain. Pt states it feels good to sit up, sits EOB approx 5 mins. Will continue to follow. (P)   -MB     Row Name 02/15/23 1163          Therapy Assessment/Plan (PT)    Rehab Potential (PT) fair, will monitor progress closely (P)   -MB     Criteria for Skilled Interventions Met (PT) yes (P)   -MB     Therapy Frequency (PT) 5 times/wk (P)   -MB     Row Name 02/15/23 1092          Positioning and Restraints    Pre-Treatment Position in bed (P)   -MB     Post Treatment Position bed (P)   -MB     In Bed fowlers;call light within reach;encouraged to call for assist;exit alarm on;side rails up x3 (P)   -MB           User Key  (r) = Recorded By, (t) = Taken By, (c) = Cosigned By    Initials Name Provider Type    Aamir Hackett, PT Student PT Student               Outcome Measures     Row Name 02/15/23 1315 02/15/23 09       How much help from another person do you currently need...    Turning from your back to your side while in flat bed without using bedrails? 2 (P)   -MB 2  -MYRA    Moving from lying on back to sitting on the side of a flat bed without bedrails? 2 (P)   -MB 2  -MYRA    Moving to and  from a bed to a chair (including a wheelchair)? 2 (P)   -MB 1  -MYRA    Standing up from a chair using your arms (e.g., wheelchair, bedside chair)? 2 (P)   -MB 2  -MYRA    Climbing 3-5 steps with a railing? 1 (P)   -MB 1  -MYRA    To walk in hospital room? 1 (P)   -MB 1  -MYRA    AM-PAC 6 Clicks Score (PT) 10 (P)   -MB 9  -MYRA    Highest level of mobility 4 --> Transferred to chair/commode (P)   -MB 3 --> Sat at edge of bed  -MYRA    Row Name 02/15/23 1315          Functional Assessment    Outcome Measure Options AM-PAC 6 Clicks Basic Mobility (PT) (P)   -MB           User Key  (r) = Recorded By, (t) = Taken By, (c) = Cosigned By    Initials Name Provider Type    Tomasa Cunningham, RN Registered Nurse    Aamir Hackett, PT Student PT Student                             Physical Therapy Education     Title: PT OT SLP Therapies (In Progress)     Topic: Physical Therapy (In Progress)     Point: Mobility training (Done)     Learning Progress Summary           Patient Acceptance, E,TB, VU,NR by MB at 2/15/2023 1316    Acceptance, E, NR by DB at 2/13/2023 1548    Acceptance, E, NR by RS at 2/12/2023 1216    Acceptance, E,TB, VU by MB at 2/9/2023 1113   Family Acceptance, E, VU by DB at 2/13/2023 1548                   Point: Home exercise program (In Progress)     Learning Progress Summary           Patient Acceptance, E, NR by DB at 2/13/2023 1548    Acceptance, E, NR by RS at 2/12/2023 1216   Family Acceptance, E, VU by DB at 2/13/2023 1548                   Point: Body mechanics (In Progress)     Learning Progress Summary           Patient Acceptance, E, NR by DB at 2/13/2023 1548    Acceptance, E, NR by RS at 2/12/2023 1216   Family Acceptance, E, VU by DB at 2/13/2023 1548                   Point: Precautions (In Progress)     Learning Progress Summary           Patient Acceptance, E, NR by DB at 2/13/2023 1548    Acceptance, E, NR by RS at 2/12/2023 1216   Family Acceptance, E, VU by DB at 2/13/2023 1542                                User Key     Initials Effective Dates Name Provider Type Discipline    DB 06/16/21 -  Ella Toro, PT Physical Therapist PT    RS 06/16/21 -  Sia Pierce, PT Physical Therapist PT    MB 12/30/22 -  Aamir Gresham, PT Student PT Student PT              PT Recommendation and Plan     Plan of Care Reviewed With: (P) patient  Progress: (P) no change  Outcome Evaluation: (P) Pt seen for PT tx this AM. Agreeable to therapy, however, lethargic and decreased alertness today. Currently maxA2 for bed mobility. STS x1 trial today modAx2 with HHAx1. Able to tolerate approx 2 minutes of standing today. Reports dizziness on stand, subsides quickly. Attempts lateral steps toward HOB maxAx2 with cues. Demos shuffling steps, difficulty accepting weight shift, unsteady but no overt LOB. Limited further attempts d/t increased fatigue and pain. Pt states it feels good to sit up, sits EOB approx 5 mins. Will continue to follow.     Time Calculation:    PT Charges     Row Name 02/15/23 1257             Time Calculation    Start Time 1134 (P)   -MB      Stop Time 1149 (P)   -MB      Time Calculation (min) 15 min (P)   -MB      PT Received On 02/15/23 (P)   -MB      PT - Next Appointment 02/16/23 (P)   -MB         Time Calculation- PT    Total Timed Code Minutes- PT 15 minute(s) (P)   -MB            User Key  (r) = Recorded By, (t) = Taken By, (c) = Cosigned By    Initials Name Provider Type    Aamir Hackett, PT Student PT Student              Therapy Charges for Today     Code Description Service Date Service Provider Modifiers Qty    82432229438 HC PT THER SUPP EA 15 MIN 2/15/2023 Aamir Gresham, PT Student GP 1    86056927164 HC PT THERAPEUTIC ACT EA 15 MIN 2/15/2023 Aamir Gresham, PT Student GP 1          PT G-Codes  Outcome Measure Options: (P) AM-PAC 6 Clicks Basic Mobility (PT)  AM-PAC 6 Clicks Score (PT): (P) 10  PT Discharge Summary  Anticipated Discharge Disposition (PT): (P) skilled nursing  facility    Aamir Gresham, PT Student  2/15/2023

## 2023-02-15 NOTE — PROGRESS NOTES
Name: Cara Garvey ADMIT: 2023   : 1934  PCP: Princess Yi MD    MRN: [id number] LOS: 6 days   AGE/SEX: 88 y.o. male  ROOM: [facility]     Subjective   Subjective   No pain at present. Still unaware that he has multiple fractures and is s/p hip fracture repair. No SOA. No CP or palp. No N/V. No diaphoresis. Keeps taking sling off. No changes noted today.    Review of Systems     as above    Objective   Objective   Vital Signs  Temp:  [97.2 °F (36.2 °C)-98.2 °F (36.8 °C)] 97.8 °F (36.6 °C)  Heart Rate:  [61-79] 61  Resp:  [16-20] 16  BP: (145-162)/(56-67) 147/66  SpO2:  [94 %-98 %] 94 %  on   ;   Device (Oxygen Therapy): room air  Body mass index is 27.49 kg/m².     NO change in exam today    Physical Exam  Vitals and nursing note reviewed.   Constitutional:       General: He is not in acute distress.     Appearance: He is ill-appearing (chronically). He is not toxic-appearing or diaphoretic.   HENT:      Head: Normocephalic.      Nose: Nose normal.      Mouth/Throat:      Mouth: Mucous membranes are moist.      Pharynx: Oropharynx is clear.      Comments: Edentulous  Eyes:      General: No scleral icterus.        Right eye: No discharge.         Left eye: No discharge.      Extraocular Movements: Extraocular movements intact.      Conjunctiva/sclera: Conjunctivae normal.   Cardiovascular:      Rate and Rhythm: Normal rate and regular rhythm.      Pulses: Normal pulses.   Pulmonary:      Effort: Pulmonary effort is normal. No respiratory distress.      Breath sounds: Normal breath sounds. No wheezing or rales.   Abdominal:      General: Bowel sounds are normal. There is no distension.      Palpations: Abdomen is soft.      Tenderness: There is no abdominal tenderness.   Musculoskeletal:         General: No swelling or deformity.      Cervical back: Neck supple. No rigidity.      Comments: NVI in distal RUE  NVI in distal RLE   Lymphadenopathy:      Cervical: No cervical adenopathy.    Skin:     General: Skin is warm and dry.      Capillary Refill: Capillary refill takes less than 2 seconds.      Coloration: Skin is not jaundiced.      Findings: No rash.   Neurological:      Mental Status: He is alert. Mental status is at baseline.      Cranial Nerves: No cranial nerve deficit.      Coordination: Coordination normal.      Comments: Oriented to person   Psychiatric:         Mood and Affect: Mood normal.         Behavior: Behavior normal.      Comments: Pleasant       Results Review     I reviewed the patient's new clinical results.  Results from last 7 days   Lab Units 02/15/23  0514 02/14/23  0422 02/13/23  0421 02/12/23  0502   WBC 10*3/mm3 6.76 6.73 6.37 6.92   HEMOGLOBIN g/dL 9.1* 9.1* 8.4* 8.5*   PLATELETS 10*3/mm3 227 215 169 153     Results from last 7 days   Lab Units 02/15/23  0515 02/14/23  0422 02/13/23  0421 02/12/23  0502   SODIUM mmol/L 140 141 140 138   POTASSIUM mmol/L 3.9 3.5 3.8 3.8   CHLORIDE mmol/L 106 106 106 106   CO2 mmol/L 23.3 25.8 24.4 23.0   BUN mg/dL 19 22 29* 32*   CREATININE mg/dL 0.77 0.78 0.96 1.02   GLUCOSE mg/dL 93 103* 88 103*   EGFR mL/min/1.73 86.1 85.8 76.0 70.7     Results from last 7 days   Lab Units 02/13/23 0421 02/12/23  0502 02/11/23  0704 02/10/23  0738   ALBUMIN g/dL 3.0* 3.0* 3.3* 3.0*   BILIRUBIN mg/dL 0.7 0.7 0.6  --    ALK PHOS U/L 80 62 67  --    AST (SGOT) U/L 19 22 22  --    ALT (SGPT) U/L 7 7 8  --      Results from last 7 days   Lab Units 02/15/23  0515 02/14/23  0422 02/13/23  0421 02/12/23  0502 02/11/23  0704 02/10/23  0738 02/10/23  0738   CALCIUM mg/dL 8.1* 8.4* 8.1* 7.9* 8.0*  --  7.8*   ALBUMIN g/dL  --   --  3.0* 3.0* 3.3*  --  3.0*   MAGNESIUM mg/dL 2.0 2.1 2.2 2.1 1.9   < >  --     < > = values in this interval not displayed.       No results found for: HGBA1C, POCGLU    No radiology results for the last day  I have personally reviewed all medications:  Scheduled Medications  apixaban, 2.5 mg, Oral, Q12H  atorvastatin, 20 mg, Oral,  Daily  carvedilol, 12.5 mg, Oral, BID With Meals  cholecalciferol, 1,000 Units, Oral, Daily  docusate sodium, 200 mg, Oral, BID  finasteride, 5 mg, Oral, Daily  levothyroxine, 25 mcg, Oral, Q AM  losartan, 25 mg, Oral, Q24H  melatonin, 6 mg, Oral, Nightly  memantine, 10 mg, Oral, Q12H  OLANZapine, 5 mg, Intramuscular, Once  senna-docusate sodium, 2 tablet, Oral, BID  sodium chloride, 10 mL, Intravenous, Q12H  tamsulosin, 0.4 mg, Oral, Nightly  vitamin D, 50,000 Units, Oral, Q7 Days    Infusions   Diet  Diet: Regular/House Diet; Texture: Regular Texture (IDDSI 7); Fluid Consistency: Nectar Thick    I have personally reviewed:  [x]  Laboratory   []  Microbiology   []  Radiology   []  EKG/Telemetry  []  Cardiology/Vascular   []  Pathology    []  Records       Assessment/Plan     Active Hospital Problems    Diagnosis  POA   • **Fracture of femoral neck, right (HCC) [S72.001A]  Yes   • Closed nondisplaced fracture of medial condyle of right humerus [S42.464A]  Yes   • Moderate dementia without behavioral disturbance [F03.B0]  Yes   • Vitamin D deficiency [E55.9]  Yes   • CAD (coronary artery disease) [I25.10]  Yes   • GERD (gastroesophageal reflux disease) [K21.9]  Yes   • Closed fracture of proximal end of right humerus, unspecified fracture morphology, initial encounter [S42.201A]  Yes   • HTN (hypertension) [I10]  Yes   • HLD (hyperlipidemia) [E78.5]  Yes      Resolved Hospital Problems   No resolved problems to display.       Mr. Garvey is a 88 y.o. non-smoker with a history of CAD s/p stent to LAD, HTN, HLD, and GERD, who presented to [facility] secondary to shoulder pain after unwitnessed mechanical fall. Imaging revealed right proximal humerus fracture. Later in admission was found to have right hip fracture and right distal humerus fractures as well.     Right proximal humerus fracture  Right distal humerus fracture: Orthopedic Surgery evaluated, recommend conservative management, no surgery  required. NWB RUE. Continue to utilize sling as much as possible, can remove sling for hygiene as well as regular elbow motion as tolerated. Follow-up with Orthopedic Surgery in 2 weeks as outpt.     Right femoral neck fracture:  Patient complaied of acute right hip pain after admission, x-ray showed right femoral neck fracture. Orthopedic Surgery consulted. Pt underwent bipolar arthroplasty and abductor tendon repair 2/10 by Dr. Lawler. BID Eliquis ordered by Ortho for DVT ppx. F/u with Ortho in 2 weeks.     Hypocalcemia:  Calcium 8.1 this morning. Vitamin D only 21.4. Have started oral ergocalciferol.     Dementia: Patient oriented to name. Continue home Namenda.     Essential hypertension: BPs sl better since restarting Losartan. Continue Coreg as well.     Hypothyroidism: Continue levothyroxine. TSH wnl.     Anemia of chronic disease: Iron studies consistent anemia of chronic disease. B12 and folate normal. Hgb stable now. Continue to monitor.    CAD: no CP or SOA. Continue BB and statin. Not on antiplatelet prior to admission.    O/P Dysphagia: Lung exam is fine today and no hypoxia or leukocytosis. SLP has seen. Modified diet in place (regular solids with NTL) and pt tolerating fine.       · Eliquis BID for DVT prophylaxis per Ortho.  · Full code.  · Discussed with patient and CCP.  · Anticipate discharge to SNU facility as soon as arrangements are in place and family is in agreement--maybe later today.      Krishna Moon MD  [facility]  02/15/23  10:03 EST

## 2023-02-15 NOTE — CASE MANAGEMENT/SOCIAL WORK
Continued Stay Note  [facility]     Patient Name: Cara Garvey  MRN: [id number]  Today's Date: 2/15/2023    Admit Date: 2/7/2023    Plan: SNF   Discharge Plan     Row Name 02/15/23 1522       Plan    Plan SNF    Patient/Family in Agreement with Plan yes    Plan Comments CCP spoke with daughter, Brittany, regarding SN[facility] options of [facility] both accepting. Brittany wanted to look at other options and was agreeable to referral to [facility]. Referral placed and called to [facility]. [facility] following patient for [place] pending behaviors. Patient must be 48 hours free from prn zyprexa. Leni KING RN CCP               Discharge Codes    No documentation.               Expected Discharge Date and Time     Expected Discharge Date Expected Discharge Time    Feb 15, 2023             Leni Mesa RN

## 2023-02-15 NOTE — PLAN OF CARE
Goal Outcome Evaluation:  Plan of Care Reviewed With: patient        Progress: improving  Outcome Evaluation: Baseline confused, but very pleasant this shift. VSS. Dressing CDI. Worked with PT, assist x2. Voiding per brief/urinal. Tolerating nectar thickened liquids. Pain controlled. D/C plans pending, SNF when ready. Family updated.

## 2023-02-16 VITALS
SYSTOLIC BLOOD PRESSURE: 106 MMHG | DIASTOLIC BLOOD PRESSURE: 62 MMHG | WEIGHT: 175.49 LBS | RESPIRATION RATE: 16 BRPM | TEMPERATURE: 97.8 F | OXYGEN SATURATION: 96 % | HEART RATE: 61 BPM | BODY MASS INDEX: 27.54 KG/M2 | HEIGHT: 67 IN

## 2023-02-16 LAB
ANION GAP SERPL CALCULATED.3IONS-SCNC: 14.1 MMOL/L (ref 5–15)
BUN SERPL-MCNC: 20 MG/DL (ref 8–23)
BUN/CREAT SERPL: 25 (ref 7–25)
CALCIUM SPEC-SCNC: 8.1 MG/DL (ref 8.6–10.5)
CHLORIDE SERPL-SCNC: 105 MMOL/L (ref 98–107)
CO2 SERPL-SCNC: 21.9 MMOL/L (ref 22–29)
CREAT SERPL-MCNC: 0.8 MG/DL (ref 0.76–1.27)
DEPRECATED RDW RBC AUTO: 41.4 FL (ref 37–54)
EGFRCR SERPLBLD CKD-EPI 2021: 85.1 ML/MIN/1.73
ERYTHROCYTE [DISTWIDTH] IN BLOOD BY AUTOMATED COUNT: 12.7 % (ref 12.3–15.4)
GLUCOSE SERPL-MCNC: 79 MG/DL (ref 65–99)
HCT VFR BLD AUTO: 30.1 % (ref 37.5–51)
HGB BLD-MCNC: 9.9 G/DL (ref 13–17.7)
MAGNESIUM SERPL-MCNC: 2.2 MG/DL (ref 1.6–2.4)
MCH RBC QN AUTO: 30 PG (ref 26.6–33)
MCHC RBC AUTO-ENTMCNC: 32.9 G/DL (ref 31.5–35.7)
MCV RBC AUTO: 91.2 FL (ref 79–97)
PLATELET # BLD AUTO: 261 10*3/MM3 (ref 140–450)
PMV BLD AUTO: 9.6 FL (ref 6–12)
POTASSIUM SERPL-SCNC: 4.2 MMOL/L (ref 3.5–5.2)
RBC # BLD AUTO: 3.3 10*6/MM3 (ref 4.14–5.8)
SODIUM SERPL-SCNC: 141 MMOL/L (ref 136–145)
WBC NRBC COR # BLD: 6.98 10*3/MM3 (ref 3.4–10.8)

## 2023-02-16 PROCEDURE — 80048 BASIC METABOLIC PNL TOTAL CA: CPT | Performed by: HOSPITALIST

## 2023-02-16 PROCEDURE — 83735 ASSAY OF MAGNESIUM: CPT | Performed by: HOSPITALIST

## 2023-02-16 PROCEDURE — 85027 COMPLETE CBC AUTOMATED: CPT | Performed by: HOSPITALIST

## 2023-02-16 PROCEDURE — 97530 THERAPEUTIC ACTIVITIES: CPT

## 2023-02-16 RX ORDER — ACETAMINOPHEN 325 MG/1
650 TABLET ORAL EVERY 4 HOURS PRN
Start: 2023-02-16

## 2023-02-16 RX ORDER — ERGOCALCIFEROL 1.25 MG/1
50000 CAPSULE ORAL
Qty: 5 CAPSULE
Start: 2023-02-17

## 2023-02-16 RX ORDER — HYDROCODONE BITARTRATE AND ACETAMINOPHEN 5; 325 MG/1; MG/1
1 TABLET ORAL EVERY 4 HOURS PRN
Qty: 10 TABLET | Refills: 0 | Status: SHIPPED | OUTPATIENT
Start: 2023-02-16 | End: 2023-02-19

## 2023-02-16 RX ORDER — PSEUDOEPHEDRINE HCL 30 MG
200 TABLET ORAL 2 TIMES DAILY
Start: 2023-02-16

## 2023-02-16 RX ADMIN — LEVOTHYROXINE SODIUM 25 MCG: 0.03 TABLET ORAL at 06:22

## 2023-02-16 RX ADMIN — ATORVASTATIN CALCIUM 20 MG: 20 TABLET, FILM COATED ORAL at 09:27

## 2023-02-16 RX ADMIN — Medication 1000 UNITS: at 09:27

## 2023-02-16 RX ADMIN — HYDROCODONE BITARTRATE AND ACETAMINOPHEN 1 TABLET: 5; 325 TABLET ORAL at 06:22

## 2023-02-16 RX ADMIN — FINASTERIDE 5 MG: 5 TABLET, FILM COATED ORAL at 09:27

## 2023-02-16 RX ADMIN — HYDROCODONE BITARTRATE AND ACETAMINOPHEN 1 TABLET: 5; 325 TABLET ORAL at 15:32

## 2023-02-16 RX ADMIN — MEMANTINE HYDROCHLORIDE 10 MG: 10 TABLET, FILM COATED ORAL at 09:26

## 2023-02-16 RX ADMIN — APIXABAN 2.5 MG: 2.5 TABLET, FILM COATED ORAL at 09:27

## 2023-02-16 RX ADMIN — CARVEDILOL 12.5 MG: 12.5 TABLET, FILM COATED ORAL at 09:22

## 2023-02-16 RX ADMIN — LOSARTAN POTASSIUM 25 MG: 25 TABLET, FILM COATED ORAL at 09:26

## 2023-02-16 RX ADMIN — DOCUSATE SODIUM 50MG AND SENNOSIDES 8.6MG 2 TABLET: 8.6; 5 TABLET, FILM COATED ORAL at 09:27

## 2023-02-16 NOTE — DISCHARGE SUMMARY
Patient Name: Cara Garvey  : 1934  MRN: [id number]    Date of Admission: 2023  Date of Discharge:  2023  Primary Care Physician: Princess Yi MD      Chief Complaint:   Fall and Shoulder Injury      Discharge Diagnoses     Active Hospital Problems    Diagnosis  POA   • **Fracture of femoral neck, right (HCC) [S72.001A]  Yes   • Closed nondisplaced fracture of medial condyle of right humerus [S42.464A]  Yes   • Moderate dementia without behavioral disturbance [F03.B0]  Yes   • Vitamin D deficiency [E55.9]  Yes   • CAD (coronary artery disease) [I25.10]  Yes   • GERD (gastroesophageal reflux disease) [K21.9]  Yes   • Closed fracture of proximal end of right humerus, unspecified fracture morphology, initial encounter [S42.201A]  Yes   • HTN (hypertension) [I10]  Yes   • HLD (hyperlipidemia) [E78.5]  Yes      Resolved Hospital Problems   No resolved problems to display.        Hospital Course     Mr. Garvey is a pleasant 88 y.o. non-smoker with a history of CAD s/p stent to LAD, HTN, HLD, dementia, and GERD, who presented to [facility] secondary to shoulder pain after unwitnessed mechanical fall. Imaging revealed right proximal humerus fracture. Later in admission was found to have right hip fracture and right distal humerus fractures as well. Please see below for details of admission:     Right proximal humerus fracture  Right distal humerus fracture: Orthopedic Surgery evaluated, recommend conservative management, no surgery required. NWB RUE. Continue to utilize sling as much as possible, can remove sling for hygiene as well as regular elbow motion as tolerated. Follow-up with Orthopedic Surgery in 2 weeks as outpt.     Right femoral neck fracture:  Patient complaied of acute right hip pain after admission, x-ray showed right femoral neck fracture. Orthopedic Surgery consulted. Pt underwent bipolar arthroplasty and abductor tendon repair 2/10 by Dr. Lawler. BID  Eliquis ordered by Ortho for DVT ppx. F/u with Ortho in 2 weeks.     Hypocalcemia:  Calcium 8.1 again this morning. Vitamin D only 21.4. Have started oral ergocalciferol weekly here. F/u with PCP to monitor response to therapy.     Dementia: Patient oriented to name. Continued home Namenda. Restarted Zoloft.     Essential hypertension: BPs sl better since restarting Losartan. Continued Coreg as well.     Hypothyroidism: Continued levothyroxine. TSH wnl.     Anemia of chronic disease: Iron studies consistent anemia of chronic disease. B12 and folate normal. Hgb stable now.     CAD: no CP or SOA. Continued BB and statin. Not on antiplatelet prior to admission.     O/P Dysphagia: Lung exam is fine today and no hypoxia or leukocytosis. SLP has seen. Modified diet in place (regular solids with NTL) and pt tolerating fine.        • Eliquis BID for DVT prophylaxis per Ortho.  • Full code confirmed.  • Discussed with patient and CCP.  • Anticipate discharge to SNU facility later today.    Day of Discharge     Subjective:   No pain at present. Remains unaware that he has multiple fractures and is s/p hip fracture repair. No SOA. No CP or palp. No N/V. No diaphoresis. Keeps taking sling off. Tolerating diet. No changes noted today.    Physical Exam:  Temp:  [97.2 °F (36.2 °C)-97.3 °F (36.3 °C)] 97.2 °F (36.2 °C)  Heart Rate:  [57-69] 66  Resp:  [18] 18  BP: (136-153)/(59-60) 136/59  Body mass index is 27.49 kg/m².  Physical Exam  Vitals and nursing note reviewed.   Constitutional:       General: He is not in acute distress.     Appearance: He is ill-appearing (chronically). He is not toxic-appearing or diaphoretic.   HENT:      Head: Normocephalic.      Nose: Nose normal.      Mouth/Throat:      Mouth: Mucous membranes are moist.      Pharynx: Oropharynx is clear.      Comments: Edentulous  Eyes:      General: No scleral icterus.        Right eye: No discharge.         Left eye: No discharge.      Extraocular Movements:  Extraocular movements intact.      Conjunctiva/sclera: Conjunctivae normal.   Cardiovascular:      Rate and Rhythm: Normal rate and regular rhythm.      Pulses: Normal pulses.   Pulmonary:      Effort: Pulmonary effort is normal. No respiratory distress.      Breath sounds: Normal breath sounds. No wheezing or rales.   Abdominal:      General: Bowel sounds are normal. There is no distension.      Palpations: Abdomen is soft.      Tenderness: There is no abdominal tenderness.   Musculoskeletal:         General: No swelling or deformity.      Cervical back: Neck supple. No rigidity.      Comments: NVI in distal RUE  NVI in distal RLE   Lymphadenopathy:      Cervical: No cervical adenopathy.   Skin:     General: Skin is warm and dry.      Capillary Refill: Capillary refill takes less than 2 seconds.      Coloration: Skin is not jaundiced.      Findings: No rash.   Neurological:      Mental Status: He is alert. Mental status is at baseline.      Cranial Nerves: No cranial nerve deficit.      Coordination: Coordination normal.      Comments: Oriented to person   Psychiatric:         Mood and Affect: Mood normal.         Behavior: Behavior normal.      Comments: Pleasant         Consultants     Consult Orders (all) (From admission, onward)     Start     Ordered    02/10/23 1831  Inpatient Case Management  Consult  Once        Provider:  (Not yet assigned)    02/10/23 1830    02/09/23 1644  Inpatient Orthopedic Surgery Consult  Once        Specialty:  Orthopedic Surgery  Provider:  Saeed Hernandez MD    02/09/23 1644    02/07/23 2049  Inpatient Orthopedic Surgery Consult  Once        Specialty:  Orthopedic Surgery  Provider:  Bianca Stafford MD    02/07/23 2049 02/07/23 1947  [facility] (on-call MD unless specified) Details  Once        Specialty:  Hospitalist  Provider:  Krishna Moon MD    02/07/23 1946              Procedures     RIGHT BIPOLAR  HIP/ POSTERIOR      Imaging Results (All)     Procedure  Component Value Units Date/Time    CT Upper Extremity Right Without Contrast [[id number]] Collected: 02/11/23 1430     Updated: 02/11/23 1439    Narrative:      CT UPPER EXTREMITY RIGHT WO CONTRAST-     INDICATIONS: Possible occult fracture.  Radiation dose reduction  techniques were utilized, including automated exposure control and  exposure modulation based on body size.     TECHNIQUE: Noncontrast CT of the right elbow     COMPARISON: Correlated with x-ray from 02/09/2023     FINDINGS:     A nondisplaced fracture is apparent at the intercondylar groove of the  distal end of the right humerus, for example axial images 50 through 60  (better visualized on the soft tissue algorithm). No erosion, or  dislocation is identified. Minimal anterior elbow effusion is apparent.  Degenerative spurring is seen at the elbow.       Impression:         Evidence of nondisplaced fracture at the intercondylar groove of the  distal end of the right humerus.     This report was finalized on 2/11/2023 2:36 PM by Dr. Eddie Jones M.D.       XR Hip 1 View Without Pelvis Right (Surgery Only) [[id number]] Collected: 02/10/23 1733     Updated: 02/10/23 1736    Narrative:      XR HIP 1 VIEW WO PELVIS RIGHT-     INDICATIONS: Postoperative evaluation.     TECHNIQUE: Frontal view of the right hip     COMPARISON: 02/09/2023     FINDINGS:      Intact appearing proximal right femoral aldair-arthroplasty hardware is  seen with adjacent surgical soft tissue gas. No acute fracture is  identified. Arterial calcification is present.          Impression:         Postsurgical changes.           This report was finalized on 2/10/2023 5:33 PM by Dr. Eddie Jones M.D.       XR Elbow 2 View Right [[id number]] Collected: 02/09/23 1329     Updated: 02/09/23 1333    Narrative:      XR ELBOW 2 VW RIGHT-     INDICATIONS: Trauma     TECHNIQUE: 2 views the right elbow     COMPARISON: None available     FINDINGS:     Assessment is significantly limited by  difficulty with patient  positioning. No obvious displaced fracture is identified, but if there  is clinical suspicion for fracture, additional views and/or  cross-sectional imaging would be recommended. Owing to patient  positioning, presence or absence of elbow effusion cannot be accurately  characterized.       Impression:         As described.     This report was finalized on 2/9/2023 1:30 PM by Dr. Eddie Jones M.D.       XR Hip With or Without Pelvis 2 - 3 View Right [[id number]] Collected: 02/09/23 1328     Updated: 02/09/23 1332    Narrative:      XR HIP W OR WO PELVIS 2-3 VIEW RIGHT-     INDICATIONS: Trauma     TECHNIQUE: Frontal view the pelvis, 2 views of the right hip     COMPARISON: None available     FINDINGS:     Fracture of the right femoral neck is noted with proximal retraction  distal fracture fragment. No other fractures are identified. No  dislocation. Hip joint spaces appear preserved. Degenerative changes are  seen in the partly included lumbar spine. Arterial calcifications are  present.       Impression:         Right femoral neck fracture.     This report was finalized on 2/9/2023 1:29 PM by Dr. Eddie Jones M.D.       CT Head Without Contrast [[id number]] Collected: 02/07/23 1826     Updated: 02/07/23 1833    Narrative:      CT HEAD AND CERVICAL SPINE WITHOUT CONTRAST     CLINICAL HISTORY: Unwitnessed fall. Neck pain. History of C2 fracture in  July 2022.     TECHNIQUE: CT scan of the head was obtained with 3 mm axial soft tissue  and 2 mm bone algorithm images. No intravenous contrast was  administered. Sagittal and coronal reconstructions were obtained.     COMPARISON: No previous similar studies are currently available for  comparison.     FINDINGS:       There is no evidence for a calvarial fracture and there is no evidence  for an acute extra-axial hemorrhage. The ventricles, sulci, and cisterns  are age-appropriate. The gray-white matter differentiation is within  normal  limits. The basal ganglia and thalami are unremarkable in  appearance. The posterior fossa structures are within normal limits.  Atherosclerotic calcifications are incidentally appreciated within the  intracranial vasculature. Mild changes of chronic small vessel ischemic  phenomena are incidentally noted.     There is mild mucosal thickening noted within the left aspect of the  frontal sinus and this extends into the adjacent frontal ethmoidal  recess.       Impression:         No evidence for acute traumatic intracranial pathology.           TECHNIQUE: CT scan of the cervical spine was obtained with 1 mm axial  bone algorithm and 2 mm axial soft tissue algorithm images. Sagittal and  coronal reconstructed images were obtained.     FINDINGS:     The patient apparently has a history of a C2 fracture. There are no  prior cervical spine imaging studies currently available for comparison.  There is an age-indeterminate deformity within the base of the odontoid  process. Unfortunately, as there are no previous studies available for  comparison, any change from the patient's baseline cannot be assessed on  the basis of this study. If older studies are made available for  comparison, an addendum will be gladly generated.     There are mild age-indeterminate compression deformity seen at T2 and  T3.     There is anterior spondylolisthesis of C4 on C5 by approximately 3 mm.  Advanced degenerative disc changes are seen at the C5-6 level. A disc  osteophyte complex at C5-C6 results in a mild degree of canal stenosis.  Bulging disc material at C3-4 and C4-5 results in up to a  moderate-to-severe degree of canal stenosis at the C4-5 level. There is  probable cord compression at this level. Foraminal stenotic changes are  most prominently seen at the C3-4, C4-5, and C5-6 levels. There is  osseous fusion noted across the C6-7 disc space presumably representing  a prior anterior cervical discectomy and fusion procedure.     There  is an indeterminate hypodense nodule within the right lobe of the  thyroid measuring up to 2.4 cm in diameter. This could be further  evaluated with sonography, only if clinically indicated.     IMPRESSION:     There is a deformity at the level of the base of the odontoid process.  The patient reportedly has a history of an odontoid fracture. However,  the appearance of the odontoid with respect to its baseline appearance  is not assessed as there are no previous cervical spine imaging studies  currently available for comparison. If these images are submitted for  comparison, an addendum would be gladly generated. Unfortunately, on the  basis of these images, an acute injury to the odontoid could not be  excluded.     There is mild vertebral body height loss at the T2 and T3 levels. These  compression deformities are age indeterminate. Further temporal  characterization could be made with MR imaging, as clinically indicated.     There is degenerative phenomena within the cervical spine that has been  discussed in detail above. There is up to a moderate-to-severe degree of  central canal stenosis at the C4-5 level secondary to a disc bulge with  probable cord compression.     There is osseous fusion seen across the C6-7 disc space that is  presumably from a prior anterior cervical discectomy and fusion  procedure.     There is an indeterminate 2.4 cm hypodense nodule within the right lobe  of the thyroid which could be further evaluated with thyroid sonography,  only if clinically indicated.     These findings and recommendations were discussed with Dr. Lee Blanchard on  02/07/2023 at approximately 6:12 PM.        Radiation dose reduction techniques were utilized, including automated  exposure control and exposure modulation based on body size.     This report was finalized on 2/7/2023 6:29 PM by Dr. Roby Mendez M.D.       CT Cervical Spine Without Contrast [[id number]] Collected: 02/07/23 1826     Updated: 02/07/23 1833     Narrative:      CT HEAD AND CERVICAL SPINE WITHOUT CONTRAST     CLINICAL HISTORY: Unwitnessed fall. Neck pain. History of C2 fracture in  July 2022.     TECHNIQUE: CT scan of the head was obtained with 3 mm axial soft tissue  and 2 mm bone algorithm images. No intravenous contrast was  administered. Sagittal and coronal reconstructions were obtained.     COMPARISON: No previous similar studies are currently available for  comparison.     FINDINGS:       There is no evidence for a calvarial fracture and there is no evidence  for an acute extra-axial hemorrhage. The ventricles, sulci, and cisterns  are age-appropriate. The gray-white matter differentiation is within  normal limits. The basal ganglia and thalami are unremarkable in  appearance. The posterior fossa structures are within normal limits.  Atherosclerotic calcifications are incidentally appreciated within the  intracranial vasculature. Mild changes of chronic small vessel ischemic  phenomena are incidentally noted.     There is mild mucosal thickening noted within the left aspect of the  frontal sinus and this extends into the adjacent frontal ethmoidal  recess.       Impression:         No evidence for acute traumatic intracranial pathology.           TECHNIQUE: CT scan of the cervical spine was obtained with 1 mm axial  bone algorithm and 2 mm axial soft tissue algorithm images. Sagittal and  coronal reconstructed images were obtained.     FINDINGS:     The patient apparently has a history of a C2 fracture. There are no  prior cervical spine imaging studies currently available for comparison.  There is an age-indeterminate deformity within the base of the odontoid  process. Unfortunately, as there are no previous studies available for  comparison, any change from the patient's baseline cannot be assessed on  the basis of this study. If older studies are made available for  comparison, an addendum will be gladly generated.     There are mild  age-indeterminate compression deformity seen at T2 and  T3.     There is anterior spondylolisthesis of C4 on C5 by approximately 3 mm.  Advanced degenerative disc changes are seen at the C5-6 level. A disc  osteophyte complex at C5-C6 results in a mild degree of canal stenosis.  Bulging disc material at C3-4 and C4-5 results in up to a  moderate-to-severe degree of canal stenosis at the C4-5 level. There is  probable cord compression at this level. Foraminal stenotic changes are  most prominently seen at the C3-4, C4-5, and C5-6 levels. There is  osseous fusion noted across the C6-7 disc space presumably representing  a prior anterior cervical discectomy and fusion procedure.     There is an indeterminate hypodense nodule within the right lobe of the  thyroid measuring up to 2.4 cm in diameter. This could be further  evaluated with sonography, only if clinically indicated.     IMPRESSION:     There is a deformity at the level of the base of the odontoid process.  The patient reportedly has a history of an odontoid fracture. However,  the appearance of the odontoid with respect to its baseline appearance  is not assessed as there are no previous cervical spine imaging studies  currently available for comparison. If these images are submitted for  comparison, an addendum would be gladly generated. Unfortunately, on the  basis of these images, an acute injury to the odontoid could not be  excluded.     There is mild vertebral body height loss at the T2 and T3 levels. These  compression deformities are age indeterminate. Further temporal  characterization could be made with MR imaging, as clinically indicated.     There is degenerative phenomena within the cervical spine that has been  discussed in detail above. There is up to a moderate-to-severe degree of  central canal stenosis at the C4-5 level secondary to a disc bulge with  probable cord compression.     There is osseous fusion seen across the C6-7 disc space that  is  presumably from a prior anterior cervical discectomy and fusion  procedure.     There is an indeterminate 2.4 cm hypodense nodule within the right lobe  of the thyroid which could be further evaluated with thyroid sonography,  only if clinically indicated.     These findings and recommendations were discussed with Dr. Lee Blanchard on  02/07/2023 at approximately 6:12 PM.        Radiation dose reduction techniques were utilized, including automated  exposure control and exposure modulation based on body size.     This report was finalized on 2/7/2023 6:29 PM by Dr. Roby Mendez M.D.       XR Shoulder 2+ View Right [[id number]] Collected: 02/07/23 1454     Updated: 02/07/23 1458    Narrative:      XR SHOULDER 2+ VW RIGHT-     INDICATIONS: Trauma     TECHNIQUE: 2 views of the right shoulder     COMPARISON: None available     FINDINGS:     Comminuted fracture of the proximal right humerus is noted, with as much  as about 3 mm cortical step-off. No other fractures are identified.  Moderate hypertrophic degenerative change is apparent at the  acromioclavicular joint. No dislocation.       Impression:         Proximal right humerus fracture.     This report was finalized on 2/7/2023 2:55 PM by Dr. Eddie Jones M.D.               Pertinent Labs     Results from last 7 days   Lab Units 02/16/23  0512 02/15/23  0514 02/14/23  0422 02/13/23  0421   WBC 10*3/mm3 6.98 6.76 6.73 6.37   HEMOGLOBIN g/dL 9.9* 9.1* 9.1* 8.4*   PLATELETS 10*3/mm3 261 227 215 169     Results from last 7 days   Lab Units 02/16/23  0512 02/15/23  0515 02/14/23  0422 02/13/23  0421   SODIUM mmol/L 141 140 141 140   POTASSIUM mmol/L 4.2 3.9 3.5 3.8   CHLORIDE mmol/L 105 106 106 106   CO2 mmol/L 21.9* 23.3 25.8 24.4   BUN mg/dL 20 19 22 29*   CREATININE mg/dL 0.80 0.77 0.78 0.96   GLUCOSE mg/dL 79 93 103* 88   EGFR mL/min/1.73 85.1 86.1 85.8 76.0     Results from last 7 days   Lab Units 02/13/23  0421 02/12/23  0502 02/11/23  0704 02/10/23  0738    ALBUMIN g/dL 3.0* 3.0* 3.3* 3.0*   BILIRUBIN mg/dL 0.7 0.7 0.6  --    ALK PHOS U/L 80 62 67  --    AST (SGOT) U/L 19 22 22  --    ALT (SGPT) U/L 7 7 8  --      Results from last 7 days   Lab Units 02/16/23  0512 02/15/23  0515 02/14/23  0422 02/13/23  0421 02/12/23  0502 02/11/23  0704 02/10/23  0738 02/10/23  0738   CALCIUM mg/dL 8.1* 8.1* 8.4* 8.1* 7.9* 8.0*  --  7.8*   ALBUMIN g/dL  --   --   --  3.0* 3.0* 3.3*  --  3.0*   MAGNESIUM mg/dL 2.2 2.0 2.1 2.2 2.1 1.9   < >  --     < > = values in this interval not displayed.               Invalid input(s): LDLCALC          Test Results Pending at Discharge       Discharge Details        Discharge Medications      New Medications      Instructions Start Date   apixaban 2.5 MG tablet tablet  Commonly known as: ELIQUIS   2.5 mg, Oral, Every 12 Hours Scheduled      docusate sodium 100 MG capsule   200 mg, Oral, 2 Times Daily      HYDROcodone-acetaminophen 5-325 MG per tablet  Commonly known as: NORCO   1 tablet, Oral, Every 4 Hours PRN      vitamin D 1.25 MG (71833 UT) capsule capsule  Commonly known as: ERGOCALCIFEROL   50,000 Units, Oral, Every 7 Days   Start Date: February 17, 2023        Changes to Medications      Instructions Start Date   acetaminophen 325 MG tablet  Commonly known as: TYLENOL  What changed: when to take this   650 mg, Oral, Every 4 Hours PRN         Continue These Medications      Instructions Start Date   albuterol sulfate  (90 Base) MCG/ACT inhaler  Commonly known as: PROVENTIL HFA;VENTOLIN HFA;PROAIR HFA   2 puffs, Inhalation, Every 6 Hours PRN      alfuzosin 10 MG 24 hr tablet  Commonly known as: UROXATRAL   10 mg, Oral, Daily      carvedilol 12.5 MG tablet  Commonly known as: COREG   12.5 mg, Oral, 2 Times Daily With Meals      finasteride 5 MG tablet  Commonly known as: PROSCAR   5 mg, Oral, Daily      folic acid 1 MG tablet  Commonly known as: FOLVITE   1 mg, Oral, Daily      levothyroxine 25 MCG tablet  Commonly known as:  SYNTHROID, LEVOTHROID   25 mcg, Oral, Every Early Morning      losartan 25 MG tablet  Commonly known as: COZAAR   25 mg, Oral, Daily      melatonin 3 MG tablet   6 mg, Oral, Nightly      memantine 10 MG tablet  Commonly known as: NAMENDA   10 mg, Oral, 2 Times Daily      omeprazole 20 MG capsule  Commonly known as: priLOSEC   20 mg, Oral, Daily      sertraline 50 MG tablet  Commonly known as: ZOLOFT   50 mg, Oral, Daily      simvastatin 40 MG tablet  Commonly known as: ZOCOR   40 mg, Oral, Daily         Stop These Medications    cholecalciferol 25 MCG (1000 UT) tablet  Commonly known as: VITAMIN D3     dextromethorphan-guaifenesin  MG/5ML syrup  Commonly known as: ROBITUSSIN-DM            Allergies   Allergen Reactions   • Lisinopril Unknown - High Severity       Discharge Disposition:  Skilled Nursing Facility (DC - External)      Discharge Diet:  Diet Order   Procedures   • Diet: Regular/House Diet; Texture: Regular Texture (IDDSI 7); Fluid Consistency: Nectar Thick       Discharge Activity:   WBAT RLE  NWB RUE    CODE STATUS:    Code Status and Medical Interventions:   Ordered at: 02/07/23 2048     Code Status (Patient has no pulse and is not breathing):    CPR (Attempt to Resuscitate)     Medical Interventions (Patient has pulse or is breathing):    Full Support       No future appointments.  Additional Instructions for the Follow-ups that You Need to Schedule     Discharge Follow-up with PCP   As directed       Currently Documented PCP:    Princess Yi MD    PCP Phone Number:    [phone]     Follow Up Details: Dr. Yi (PCP) after dc from facility         Discharge Follow-up with Specified Provider: Dr. Lawler (Ortho); 2 Weeks   As directed      To: Dr. Lawler (Ortho)    Follow Up: 2 Weeks            Contact information for follow-up providers     Parveen Soriano MD. Schedule an appointment as soon as possible for a visit in 2 week(s).    Specialty: Orthopedic Surgery  Why: repeat  x-rays  Contact information:  [address] [facility] [id number] [phone]             Princess Yi MD .    Specialty: Internal Medicine  Why: Dr. Yi (PCP) after dc from facility  Contact information:  [address] [facility]  [address]  [facility] [id number] [phone]                   Contact information for after-discharge care     Destination     [facility] .    Service: Skilled Nursing  Contact information:  [address] [facility] [address] [phone]                             Additional Instructions for the Follow-ups that You Need to Schedule     Discharge Follow-up with PCP   As directed       Currently Documented PCP:    Princess Yi MD    PCP Phone Number:    [phone]     Follow Up Details: Dr. Yi (PCP) after dc from facility         Discharge Follow-up with Specified Provider: Dr. Lawler (Ortho); 2 Weeks   As directed      To: Dr. Lawler (Ortho)    Follow Up: 2 Weeks           Time Spent on Discharge:  Greater than 30 minutes      Krishna Moon MD  [facility]  02/16/23  12:19 EST

## 2023-02-16 NOTE — CASE MANAGEMENT/SOCIAL WORK
Continued Stay Note  [facility]     Patient Name: Cara Garvey  MRN: [id number]  Today's Date: 2/16/2023    Admit Date: 2/7/2023    Plan: Skilled bed at [facility]   Discharge Plan     Row Name 02/16/23 1118       Plan    Plan Skilled bed at [facility]    Plan Comments CCP received inbound call from [facility]; bed available today but patient will need to arrive after 3:00 P.M. CCP made outbound call to patient's daughter, Brittany and discussed [facility] having bed available. Patient's daughter prefers [facility] instead of [facility]. CCP offered to call patient's other daughter, Concha. Brittany declined stating she will update her. CCP updated [facility] stretcher to [facility] with transport time for 6:00 P.M. Pharmacy and packet updated. Deborah CORTEZ    Row Name 02/16/23 1052       Plan    Plan Skilled bed at [facility]    Plan Comments CCP spoke with daughter, Brittany and reviewed accepting facilities ([facility], [facility], and [facility]). Patient's daughter does not want [facility] or [facility] due to location. Patient's daughter inquired about [facility]. CCP spoke with Anyi/Joyce and she will eval. CCP received inbound call from patient's daughter, Concah. Per Concha, she has discussed with Brittany and [facility] are [facility]. CCP made outbound call to Brittany. Discussed d/c plans and informed her we have not heard from [facility] yet. Brittany confirmed she is [facility]. CCP notified [facility]; bed available today and requested later transport today (between 4-6:00 P.M). Per Candace, no COVID test needed. CCP arranged [facility] stretcher for 6:00 P.M. CCP updated patient's daughters on transport time. CCP updated [facility] and patient's RN. Deborah CORTEZ               Discharge Codes    No documentation.               Expected Discharge Date and Time     Expected Discharge Date Expected Discharge Time    Feb 16, 2023             DIEGO George

## 2023-02-16 NOTE — PLAN OF CARE
Goal Outcome Evaluation:  Plan of Care Reviewed With: patient        Progress: improving  Outcome Evaluation: Pt tolerated treatment with c/o right shoulder pain. Pt is progressing with mobility . Pt required ModA with bed mobility with verbal cues to maintain NWB precautions on RUE. Pt is Max assit of 2 to stand but able to stand/pivot from bed to chair with ModAX2. Pt able to shuffle feet around better today and stood for a couple of minutes. Will continue to progress pt as able.

## 2023-02-16 NOTE — THERAPY TREATMENT NOTE
Patient Name: Cara Garvey  : 1934    MRN: [id number]                              Today's Date: 2023       Admit Date: 2023    Visit Dx:     ICD-10-CM ICD-9-CM   1. Closed fracture of proximal end of right humerus, unspecified fracture morphology, initial encounter  S42.201A 812.00   2. History of dementia  Z86.59 V11.8   3. Compression fracture of thoracic vertebra, unspecified thoracic vertebral level, initial encounter ([facility])  S22.000A 805.2   4. Closed nondisplaced fracture of medial condyle of right humerus, initial encounter  S42.464A 812.43   5. Closed fracture of neck of right femur, initial encounter ([facility])  S72.001A 820.8     Patient Active Problem List   Diagnosis   • Closed fracture of proximal end of right humerus, unspecified fracture morphology, initial encounter   • HTN (hypertension)   • HLD (hyperlipidemia)   • CAD (coronary artery disease)   • GERD (gastroesophageal reflux disease)   • Fracture of femoral neck, right ([facility])   • Moderate dementia without behavioral disturbance   • Vitamin D deficiency   • Closed nondisplaced fracture of medial condyle of right humerus     Past Medical History:   Diagnosis Date   • Coronary artery disease    • GERD (gastroesophageal reflux disease)    • Hyperlipidemia    • Hypertension      Past Surgical History:   Procedure Laterality Date   • HIP ENDOPROSTHESIS Right 2/10/2023    Procedure: RIGHT BIPOLAR  HIP/ POSTERIOR;  Surgeon: Gee Lawler MD;  Location: [facility];  Service: Orthopedics;  Laterality: Right;      General Information     Row Name 23 1323          Physical Therapy Time and Intention    Document Type therapy note (daily note)  -EB     Mode of Treatment physical therapy;individual therapy  -EB     Row Name 23 1323          General Information    Existing Precautions/Restrictions fall;hip, posterior;right;non-weight bearing  RUE NWB, RLE WBAT  -EB     Row Name 23 1324          Cognition    Orientation Status  (Cognition) oriented to;person;verbal cues/prompts needed for orientation;place;situation  -     Row Name 02/16/23 1323          Safety Issues, Functional Mobility    Impairments Affecting Function (Mobility) balance;cognition;endurance/activity tolerance;strength;pain;postural/trunk control  -           User Key  (r) = Recorded By, (t) = Taken By, (c) = Cosigned By    Initials Name Provider Type     Meaghan Barber PTA Physical Therapist Assistant               Mobility     Row Name 02/16/23 1324          Bed Mobility    Supine-Sit [place] (Bed Mobility) moderate assist (50% patient effort);verbal cues  -EB     Sit-Supine [place] (Bed Mobility) not tested  -     Assistive Device (Bed Mobility) head of bed elevated  -     Row Name 02/16/23 1324          Bed-Chair Transfer    Bed-Chair [place] (Transfers) moderate assist (50% patient effort);2 person assist;verbal cues;nonverbal cues (demo/gesture)  -     Comment, (Bed-Chair Transfer) stand pivot from bed to chair. Pt able to move feet around, but fatigues very quickly.  -     Row Name 02/16/23 1324          Sit-Stand Transfer    Sit-Stand [place] (Transfers) maximum assist (25% patient effort);2 person assist;nonverbal cues (demo/gesture);verbal cues  -EB     Comment, (Sit-Stand Transfer) Pt able to stand for a couple of minutes before getting to chair.  -     Row Name 02/16/23 1324          Mobility    Extremity Weight-bearing Status right upper extremity;right lower extremity  -     Right Upper Extremity (Weight-bearing Status) non weight-bearing (NWB)  -     Right Lower Extremity (Weight-bearing Status) weight-bearing as tolerated (WBAT)  -           User Key  (r) = Recorded By, (t) = Taken By, (c) = Cosigned By    Initials Name Provider Type    Meaghan Mills PTA Physical Therapist Assistant               Obj/Interventions    No documentation.                Goals/Plan    No documentation.                Clinical  Impression     Row Name 02/16/23 1326          Pain    Pain Location - Side/Orientation Right  -EB     Pain Location - shoulder  -EB     Row Name 02/16/23 1326          Plan of Care Review    Plan of Care Reviewed With patient  -EB     Progress improving  -EB     Outcome Evaluation Pt tolerated treatment with c/o right shoulder pain. Pt is progressing with mobility . Pt required ModA with bed mobility with verbal cues to maintain NWB precautions on RUE. Pt is Max assit of 2 to stand but able to stand/pivot from bed to chair with ModAX2. Pt able to shuffle feet around better today and stood for a couple of minutes. Will continue to progress pt as able.  -EB     Row Name 02/16/23 1326          Therapy Assessment/Plan (PT)    Therapy Frequency (PT) 5 times/wk  -EB     Row Name 02/16/23 1326          Positioning and Restraints    Pre-Treatment Position in bed  -EB     Post Treatment Position chair  -EB     In Chair reclined;call light within reach;encouraged to call for assist;exit alarm on  -EB           User Key  (r) = Recorded By, (t) = Taken By, (c) = Cosigned By    Initials Name Provider Type    Meaghan Mills PTA Physical Therapist Assistant               Outcome Measures     Row Name 02/16/23 1330 02/16/23 0927       How much help from another person do you currently need...    Turning from your back to your side while in flat bed without using bedrails? 3  -EB 3  -SH    Moving from lying on back to sitting on the side of a flat bed without bedrails? 2  -EB 2  -SH    Moving to and from a bed to a chair (including a wheelchair)? 2  -EB 2  -SH    Standing up from a chair using your arms (e.g., wheelchair, bedside chair)? 2  -EB 2  -SH    Climbing 3-5 steps with a railing? 1  -EB 1  -SH    To walk in hospital room? 1  -EB 1  -SH    AM-PAC 6 Clicks Score (PT) 11  -EB 11  -SH    Highest level of mobility 4 --> Transferred to chair/commode  -EB 4 --> Transferred to chair/commode  -SH          User Key  (r) =  Recorded By, (t) = Taken By, (c) = Cosigned By    Initials Name Provider Type    EB Meaghan Barber PTA Physical Therapist Assistant     Valery Mendez RN Registered Nurse                             Physical Therapy Education     Title: PT OT SLP Therapies (In Progress)     Topic: Physical Therapy (In Progress)     Point: Mobility training (Done)     Learning Progress Summary           Patient Acceptance, E,D, VU,NR by EB at 2/16/2023 1331    Acceptance, E,TB, VU,NR by MB at 2/15/2023 1316    Acceptance, E, NR by DB at 2/13/2023 1548    Acceptance, E, NR by RS at 2/12/2023 1216    Acceptance, E,TB, VU by MB at 2/9/2023 1113   Family Acceptance, E, VU by DB at 2/13/2023 1548                   Point: Home exercise program (In Progress)     Learning Progress Summary           Patient Acceptance, E, NR by DB at 2/13/2023 1548    Acceptance, E, NR by RS at 2/12/2023 1216   Family Acceptance, E, VU by DB at 2/13/2023 1548                   Point: Body mechanics (Done)     Learning Progress Summary           Patient Acceptance, E,D, VU,NR by EB at 2/16/2023 1331    Acceptance, E, NR by DB at 2/13/2023 1548    Acceptance, E, NR by RS at 2/12/2023 1216   Family Acceptance, E, VU by DB at 2/13/2023 1548                   Point: Precautions (Done)     Learning Progress Summary           Patient Acceptance, E,D, VU,NR by EB at 2/16/2023 1331    Acceptance, E, NR by DB at 2/13/2023 1548    Acceptance, E, NR by RS at 2/12/2023 1216   Family Acceptance, E, VU by DB at 2/13/2023 1548                               User Key     Initials Effective Dates Name Provider Type Discipline    EB 02/14/23 -  Meaghan Barber PTA Physical Therapist Assistant PT    DB 06/16/21 -  Ella Toro, PT Physical Therapist PT    RS 06/16/21 -  Sia Pierce, PT Physical Therapist PT    MB 12/30/22 -  Aamir Gresham, PT Student PT Student PT              PT Recommendation and Plan     Plan of Care Reviewed With: patient  Progress:  improving  Outcome Evaluation: Pt tolerated treatment with c/o right shoulder pain. Pt is progressing with mobility . Pt required ModA with bed mobility with verbal cues to maintain NWB precautions on RUE. Pt is Max assit of 2 to stand but able to stand/pivot from bed to chair with ModAX2. Pt able to shuffle feet around better today and stood for a couple of minutes. Will continue to progress pt as able.     Time Calculation:    PT Charges     Row Name 02/16/23 1323             Time Calculation    Start Time 1052  -EB      Stop Time 1103  -EB      Time Calculation (min) 11 min  -EB      PT Received On 02/16/23  -EB      PT - Next Appointment 02/17/23  -EB         Time Calculation- PT    Total Timed Code Minutes- PT 11 minute(s)  -EB            User Key  (r) = Recorded By, (t) = Taken By, (c) = Cosigned By    Initials Name Provider Type    EB Meaghan Barber PTA Physical Therapist Assistant              Therapy Charges for Today     Code Description Service Date Service Provider Modifiers Qty    86977556261 HC PT THERAPEUTIC ACT EA 15 MIN 2/16/2023 Meaghan Barber PTA GP 1    [id number]  PT THER SUPP EA 15 MIN 2/16/2023 Meaghan Barber PTA GP 1          PT G-Codes  Outcome Measure Options: AM-PAC 6 Clicks Basic Mobility (PT)  AM-PAC 6 Clicks Score (PT): 11       Meaghan Barber PTA  2/16/2023

## 2023-02-16 NOTE — PLAN OF CARE
Goal Outcome Evaluation:              Outcome Evaluation: Pt a/o x2, forgetful. Assist x2 to bedside commode, was able to have BM. PRN pain meds given PO tolerated well. D/C plans pending

## 2023-02-16 NOTE — CASE MANAGEMENT/SOCIAL WORK
Continued Stay Note  [facility]     Patient Name: Cara Garvey  MRN: [id number]  Today's Date: 2/16/2023    Admit Date: 2/7/2023    [facility]   Discharge Plan     Row Name 02/16/23 1052       Plan    Plan [facility]    Plan Comments CCP spoke with daughter, Brittany and reviewed accepting facilities ([facility], [facility], and [facility]). Patient's daughter does not want [facility] or [facility] due to location. Patient's daughter inquired about [facility]. CCP spoke with Anyi/Joyce and she will eval. CCP received inbound call from patient's daughter, Concha. Per Concha, she has discussed with Brittany and they are in agreement with [facility]. [facility] made outbound call to Brittany. Discussed d/c plans and informed her we have not heard from [facility] yet. Brittany [facility] notified [facility]; bed available today and requested later transport today (between 4-6:00 P.M). Per Candace, no COVID test needed. [facility] arranged [facility] stretcher for 6:00 P.M. CCP updated patient's daughters on transport time. [facility] updated [facility] and patient's RN. Deborah CORTEZ               Discharge Codes    No documentation.               Expected Discharge Date and Time     Expected Discharge Date Expected Discharge Time    Feb 16, 2023             DIEGO George

## 2023-02-17 NOTE — CASE MANAGEMENT/SOCIAL WORK
Case Management Discharge Note      Final Note: pt discharged to [facility] via [facility] stretcher    Provided Post Acute Provider List?: N/A  N/A Provider List Comment: Daughter request the [place] [facility]    Selected Continued Care - Discharged on 2/16/2023 Admission date: 2/7/2023 - Discharge disposition: Skilled Nursing Facility (DC - External)    Destination Coordination complete.    Service Provider Selected Services Address Phone Fax Patient Preferred    [facility] [address] [facility] [address] [phone] --          Durable Medical Equipment    No services have been selected for the patient.              Dialysis/Infusion    No services have been selected for the patient.              Home Medical Care    No services have been selected for the patient.              Therapy    No services have been selected for the patient.              Community Resources    No services have been selected for the patient.              Community & DME    No services have been selected for the patient.                  Transportation Services  W/C Van: [facility] (stretcher)    Final Discharge Disposition Code: 03 - skilled nursing facility (SNF)

## (undated) DEVICE — STRAP STIRUP WO/ RNG

## (undated) DEVICE — TBG PENCL TELESCP MEGADYNE SMOKE EVAC 10FT

## (undated) DEVICE — GLV SURG BIOGEL LTX PF 8 1/2

## (undated) DEVICE — SYRINGE, LUER LOCK, 60ML: Brand: MEDLINE

## (undated) DEVICE — MEDICINE CUP, GRADUATED, STER: Brand: MEDLINE

## (undated) DEVICE — APPL CHLORAPREP HI/LITE 26ML ORNG

## (undated) DEVICE — PREMIUM DRY TRAY LF: Brand: MEDLINE INDUSTRIES, INC.

## (undated) DEVICE — OPTIFOAM GENTLE SA, POSTOP, 4X12: Brand: MEDLINE

## (undated) DEVICE — ANTIBACTERIAL UNDYED BRAIDED (POLYGLACTIN 910), SYNTHETIC ABSORBABLE SUTURE: Brand: COATED VICRYL

## (undated) DEVICE — ADHS SKIN SURG TISS VISC PREMIERPRO EXOFIN HI/VISC FAST/DRY

## (undated) DEVICE — RECIPROCATING BLADE HEAVY DUTY LONG, OFFSET  (77.6 X 0.77 X 11.2MM)

## (undated) DEVICE — GLV SURG BIOGEL LTX PF 8

## (undated) DEVICE — PREP SOL POVIDONE/IODINE BT 4OZ

## (undated) DEVICE — PK HIP TOTL 40

## (undated) DEVICE — TRAP FLD MINIVAC MEGADYNE 100ML

## (undated) DEVICE — PREP IM ENCHANCED TOTAL HIP BONE                                    PREPARATION KIT: Brand: PREP-IM

## (undated) DEVICE — CEMENT MIXING SYSTEM WITH FEMORAL BREAKWAY NOZZLE: Brand: REVOLUTION

## (undated) DEVICE — SUT VIC 1 CT1 36IN J947H

## (undated) DEVICE — NEEDLE, QUINCKE, 18GX3.5": Brand: MEDLINE

## (undated) DEVICE — SOL ISO/ALC 70PCT 4OZ

## (undated) DEVICE — COAXIAL FEMORAL CANAL TIP

## (undated) DEVICE — DRAPE,U/ SHT,SPLIT,PLAS,STERIL: Brand: MEDLINE

## (undated) DEVICE — HANDPIECE SET WITH COAXIAL HIGH FLOW TIP AND SUCTION TUBE: Brand: INTERPULSE